# Patient Record
Sex: MALE | Race: WHITE | NOT HISPANIC OR LATINO | Employment: FULL TIME | URBAN - METROPOLITAN AREA
[De-identification: names, ages, dates, MRNs, and addresses within clinical notes are randomized per-mention and may not be internally consistent; named-entity substitution may affect disease eponyms.]

---

## 2017-10-25 ENCOUNTER — APPOINTMENT (EMERGENCY)
Dept: CT IMAGING | Facility: HOSPITAL | Age: 42
End: 2017-10-25
Payer: COMMERCIAL

## 2017-10-25 ENCOUNTER — HOSPITAL ENCOUNTER (EMERGENCY)
Facility: HOSPITAL | Age: 42
Discharge: HOME | End: 2017-10-25
Attending: EMERGENCY MEDICINE | Admitting: EMERGENCY MEDICINE
Payer: COMMERCIAL

## 2017-10-25 VITALS
SYSTOLIC BLOOD PRESSURE: 169 MMHG | HEIGHT: 71 IN | DIASTOLIC BLOOD PRESSURE: 95 MMHG | WEIGHT: 200 LBS | TEMPERATURE: 97.8 F | RESPIRATION RATE: 18 BRPM | HEART RATE: 58 BPM | BODY MASS INDEX: 28 KG/M2 | OXYGEN SATURATION: 98 %

## 2017-10-25 DIAGNOSIS — N28.89 RENAL MASS: ICD-10-CM

## 2017-10-25 DIAGNOSIS — S30.1XXA CONTUSION OF ABDOMINAL WALL, INITIAL ENCOUNTER: Primary | ICD-10-CM

## 2017-10-25 LAB
ALBUMIN SERPL BCP-MCNC: 4.2 G/DL (ref 3.5–5)
ALP SERPL-CCNC: 81 U/L (ref 46–116)
ALT SERPL W P-5'-P-CCNC: 18 U/L (ref 12–78)
ANION GAP SERPL CALCULATED.3IONS-SCNC: 6 MMOL/L (ref 4–13)
AST SERPL W P-5'-P-CCNC: 22 U/L (ref 5–45)
BASOPHILS # BLD AUTO: 0.04 THOUSANDS/ΜL (ref 0–0.1)
BASOPHILS NFR BLD AUTO: 1 % (ref 0–1)
BILIRUB SERPL-MCNC: 0.6 MG/DL (ref 0.2–1)
BUN SERPL-MCNC: 23 MG/DL (ref 5–25)
CALCIUM SERPL-MCNC: 8.9 MG/DL (ref 8.3–10.1)
CHLORIDE SERPL-SCNC: 105 MMOL/L (ref 100–108)
CO2 SERPL-SCNC: 28 MMOL/L (ref 21–32)
CREAT SERPL-MCNC: 1.05 MG/DL (ref 0.6–1.3)
EOSINOPHIL # BLD AUTO: 0.26 THOUSAND/ΜL (ref 0–0.61)
EOSINOPHIL NFR BLD AUTO: 4 % (ref 0–6)
ERYTHROCYTE [DISTWIDTH] IN BLOOD BY AUTOMATED COUNT: 12 % (ref 11.6–15.1)
GFR SERPL CREATININE-BSD FRML MDRD: 86 ML/MIN/1.73SQ M
GLUCOSE SERPL-MCNC: 101 MG/DL (ref 65–140)
HCT VFR BLD AUTO: 45.4 % (ref 36.5–49.3)
HGB BLD-MCNC: 15.9 G/DL (ref 12–17)
LIPASE SERPL-CCNC: 212 U/L (ref 73–393)
LYMPHOCYTES # BLD AUTO: 2.46 THOUSANDS/ΜL (ref 0.6–4.47)
LYMPHOCYTES NFR BLD AUTO: 35 % (ref 14–44)
MCH RBC QN AUTO: 30.7 PG (ref 26.8–34.3)
MCHC RBC AUTO-ENTMCNC: 35 G/DL (ref 31.4–37.4)
MCV RBC AUTO: 88 FL (ref 82–98)
MONOCYTES # BLD AUTO: 0.54 THOUSAND/ΜL (ref 0.17–1.22)
MONOCYTES NFR BLD AUTO: 8 % (ref 4–12)
NEUTROPHILS # BLD AUTO: 3.78 THOUSANDS/ΜL (ref 1.85–7.62)
NEUTS SEG NFR BLD AUTO: 53 % (ref 43–75)
NRBC BLD AUTO-RTO: 0 /100 WBCS
PLATELET # BLD AUTO: 193 THOUSANDS/UL (ref 149–390)
PMV BLD AUTO: 10 FL (ref 8.9–12.7)
POTASSIUM SERPL-SCNC: 4.4 MMOL/L (ref 3.5–5.3)
PROT SERPL-MCNC: 8 G/DL (ref 6.4–8.2)
RBC # BLD AUTO: 5.18 MILLION/UL (ref 3.88–5.62)
SODIUM SERPL-SCNC: 139 MMOL/L (ref 136–145)
WBC # BLD AUTO: 7.09 THOUSAND/UL (ref 4.31–10.16)

## 2017-10-25 PROCEDURE — 74177 CT ABD & PELVIS W/CONTRAST: CPT

## 2017-10-25 PROCEDURE — 85025 COMPLETE CBC W/AUTO DIFF WBC: CPT | Performed by: EMERGENCY MEDICINE

## 2017-10-25 PROCEDURE — 80053 COMPREHEN METABOLIC PANEL: CPT | Performed by: EMERGENCY MEDICINE

## 2017-10-25 PROCEDURE — 99284 EMERGENCY DEPT VISIT MOD MDM: CPT

## 2017-10-25 PROCEDURE — 36415 COLL VENOUS BLD VENIPUNCTURE: CPT | Performed by: EMERGENCY MEDICINE

## 2017-10-25 PROCEDURE — 83690 ASSAY OF LIPASE: CPT | Performed by: EMERGENCY MEDICINE

## 2017-10-25 RX ORDER — LISINOPRIL 10 MG/1
10 TABLET ORAL DAILY
COMMUNITY
End: 2018-04-16 | Stop reason: SDUPTHER

## 2017-10-25 RX ADMIN — IOHEXOL 100 ML: 350 INJECTION, SOLUTION INTRAVENOUS at 14:08

## 2017-10-25 NOTE — DISCHARGE INSTRUCTIONS
Contusion in Adults   WHAT YOU NEED TO KNOW:   A contusion is a bruise that appears on your skin after an injury  A bruise happens when small blood vessels tear but skin does not  When blood vessels tear, blood leaks into nearby tissue, such as soft tissue or muscle  DISCHARGE INSTRUCTIONS:   Return to the emergency department if:   · You have new trouble moving the injured area  · You have tingling or numbness in or near the injured area  · Your hand or foot below the bruise gets cold or turns pale  Contact your healthcare provider if:   · You find a new lump in the injured area  · Your symptoms do not improve with treatment after 4 to 5 days  · You have questions or concerns about your condition or care  Medicines: You may need any of the following:  · NSAIDs  help decrease swelling and pain or fever  This medicine is available with or without a doctor's order  NSAIDs can cause stomach bleeding or kidney problems in certain people  If you take blood thinner medicine, always ask your healthcare provider if NSAIDs are safe for you  Always read the medicine label and follow directions  · Prescription pain medicine  may be given  Do not wait until the pain is severe before you take your medicine  · Take your medicine as directed  Contact your healthcare provider if you think your medicine is not helping or if you have side effects  Tell him of her if you are allergic to any medicine  Keep a list of the medicines, vitamins, and herbs you take  Include the amounts, and when and why you take them  Bring the list or the pill bottles to follow-up visits  Carry your medicine list with you in case of an emergency  Follow up with your healthcare provider as directed: You may need to return within a week to check your injury again  Write down your questions so you remember to ask them during your visits    Help a contusion heal:   · Rest the injured area  or use it less than usual  If you bruised your leg or foot, you may need crutches or a cane to help you walk  This will help you keep weight off your injured body part  · Apply ice  to decrease swelling and pain  Ice may also help prevent tissue damage  Use an ice pack, or put crushed ice in a plastic bag  Cover it with a towel and place it on your bruise for 15 to 20 minutes every hour or as directed  · Use compression  to support the area and decrease swelling  Wrap an elastic bandage around the area over the bruised muscle  Make sure the bandage is not too tight  You should be able to fit 1 finger between the bandage and your skin  · Elevate (raise) your injured body part  above the level of your heart to help decrease pain and swelling  Use pillows, blankets, or rolled towels to elevate the area as often as you can  · Do not drink alcohol  as directed  Alcohol may slow healing  · Do not stretch injured muscles  right after your injury  Ask your healthcare provider when and how you may safely stretch after your injury  Gentle stretches can help increase your flexibility  · Do not massage the area or put heating pads  on the bruise right after your injury  Heat and massage may slow healing  Your healthcare provider may tell you to apply heat after several days  At that time, heat will start to help the injury heal   Prevent another contusion:   · Stretch and warm up before you play sports or exercise  · Wear protective gear when you play sports  Examples are shin guards and padding  · If you begin a new physical activity, start slowly to give your body a chance to adjust   © 2017 [employer] Information is for End User's use only and may not be sold, redistributed or otherwise used for commercial purposes  All illustrations and images included in CareNotes® are the copyrighted property of [employer]  or Jayson Chow  The above information is an  only   It is not intended as medical advice for individual conditions or treatments  Talk to your doctor, nurse or pharmacist before following any medical regimen to see if it is safe and effective for you  Blunt Abdominal Injury   WHAT YOU NEED TO KNOW:   A blunt abdominal injury is a direct blow to the abdomen without an open wound  These injuries are caused by car accidents, sports injuries, or a fall  Organs such as your pancreas, liver, spleen, or bladder may be injured  Your intestines may also be injured  These injuries may cause internal bleeding  DISCHARGE INSTRUCTIONS:   Call 911 for any of the following:   · You feel weak, lightheaded, or you faint  · You have a fast heartbeat, fast breathing, and pale, sweaty skin  · You have new or severe pain, swelling, or firmness in your abdomen  Return to the emergency department if:   · You have nausea and are vomiting  · You have blood in your urine or bowel movement  Contact your healthcare provider if:   · You have questions or concerns about your condition or care  Medicines:   · NSAIDs  help decrease swelling and pain or fever  This medicine is available with or without a doctor's order  NSAIDs can cause stomach bleeding or kidney problems in certain people  If you take blood thinner medicine, always ask your healthcare provider if NSAIDs are safe for you  Always read the medicine label and follow directions  · Take your medicine as directed  Contact your healthcare provider if you think your medicine is not helping or if you have side effects  Tell him or her if you are allergic to any medicine  Keep a list of the medicines, vitamins, and herbs you take  Include the amounts, and when and why you take them  Bring the list or the pill bottles to follow-up visits  Carry your medicine list with you in case of an emergency  Apply ice:  Ice helps to decrease swelling and pain  Ice may also help prevent tissue damage  Use an ice pack, or put crushed ice in a plastic bag   Cover it with a towel before you apply it to your skin  Place it on your injured area for 15 to 20 minutes every hour or as directed  Limit activity as directed: This will help decrease pain and swelling, and prevent other injuries  Do not exercise or play sports until your healthcare provider says it is okay  Follow up with your healthcare provider as directed:  Write down your questions so you remember to ask them during your visits  © 2017 26[address] [facility] Information is for End User's use only and may not be sold, redistributed or otherwise used for commercial purposes  All illustrations and images included in CareNotes® are the copyrighted property of A D A M [employer] Inc  or Jayson Chow  The above information is an  only  It is not intended as medical advice for individual conditions or treatments  Talk to your doctor, nurse or pharmacist before following any medical regimen to see if it is safe and effective for you
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
## 2017-11-02 ENCOUNTER — HOSPITAL ENCOUNTER (OUTPATIENT)
Dept: CT IMAGING | Facility: HOSPITAL | Age: 42
Discharge: HOME | End: 2017-11-02
Payer: COMMERCIAL

## 2017-11-02 ENCOUNTER — GENERIC CONVERSION - ENCOUNTER (OUTPATIENT)
Dept: OTHER | Facility: OTHER | Age: 42
End: 2017-11-02

## 2017-11-02 ENCOUNTER — TRANSCRIBE ORDERS (OUTPATIENT)
Dept: ADMINISTRATIVE | Facility: HOSPITAL | Age: 42
End: 2017-11-02

## 2017-11-02 DIAGNOSIS — N28.89 OTHER SPECIFIED DISORDERS OF KIDNEY AND URETER: ICD-10-CM

## 2017-11-02 DIAGNOSIS — N28.89 KIDNEY MASS: Primary | ICD-10-CM

## 2017-11-02 DIAGNOSIS — N28.89 KIDNEY MASS: ICD-10-CM

## 2017-11-02 PROCEDURE — 74178 CT ABD&PLV WO CNTR FLWD CNTR: CPT

## 2017-11-02 RX ADMIN — IOHEXOL 100 ML: 350 INJECTION, SOLUTION INTRAVENOUS at 10:13

## 2017-11-09 ENCOUNTER — ALLSCRIPTS OFFICE VISIT (OUTPATIENT)
Dept: OTHER | Facility: OTHER | Age: 42
End: 2017-11-09

## 2017-11-09 ENCOUNTER — APPOINTMENT (OUTPATIENT)
Dept: LAB | Facility: CLINIC | Age: 42
End: 2017-11-09
Payer: COMMERCIAL

## 2017-11-09 DIAGNOSIS — N28.89 OTHER SPECIFIED DISORDERS OF KIDNEY AND URETER: ICD-10-CM

## 2017-11-09 LAB
BACTERIA UR QL AUTO: ABNORMAL /HPF
BILIRUB UR QL STRIP: NEGATIVE
CLARITY UR: CLEAR
COLOR UR: YELLOW
GLUCOSE UR STRIP-MCNC: NEGATIVE MG/DL
HGB UR QL STRIP.AUTO: NEGATIVE
HYALINE CASTS #/AREA URNS LPF: ABNORMAL /LPF
KETONES UR STRIP-MCNC: NEGATIVE MG/DL
LEUKOCYTE ESTERASE UR QL STRIP: NEGATIVE
NITRITE UR QL STRIP: NEGATIVE
NON-SQ EPI CELLS URNS QL MICRO: ABNORMAL /HPF
PH UR STRIP.AUTO: 6.5 [PH] (ref 4.5–8)
PROT UR STRIP-MCNC: ABNORMAL MG/DL
RBC #/AREA URNS AUTO: ABNORMAL /HPF
SP GR UR STRIP.AUTO: 1.02 (ref 1–1.03)
UROBILINOGEN UR QL STRIP.AUTO: 1 E.U./DL
WBC #/AREA URNS AUTO: ABNORMAL /HPF

## 2017-11-09 PROCEDURE — 81001 URINALYSIS AUTO W/SCOPE: CPT

## 2017-11-10 ENCOUNTER — GENERIC CONVERSION - ENCOUNTER (OUTPATIENT)
Dept: OTHER | Facility: OTHER | Age: 42
End: 2017-11-10

## 2017-12-18 ENCOUNTER — TRANSCRIBE ORDERS (OUTPATIENT)
Dept: ADMINISTRATIVE | Facility: HOSPITAL | Age: 42
End: 2017-12-18

## 2017-12-18 DIAGNOSIS — N28.1 ACQUIRED CYST OF KIDNEY: Primary | ICD-10-CM

## 2018-01-12 NOTE — MISCELLANEOUS
Provider Comments  Provider Comments:   Ziggy Mo did not present for his appointment with the urologist today  I will be very happy to attend to him in the future should he wish to attend this consultation visit        Signatures   Electronically signed by : LOREN Lucas ; Nov 9 2017  9:42AM EST                       (Author)

## 2018-01-17 NOTE — PROGRESS NOTES
Assessment    1  Benign hypertension (401 1) (I10)   2  Otitis externa (380 10) (H60 90)    Plan  Benign hypertension, Health Maintenance    · From  Lisinopril 10 MG Oral Tablet TAKE 1 TABLET DAILY To Lisinopril 20 MG  Oral Tablet Take 1 tablet daily  Otitis externa    · Neomycin-Polymyxin-HC 1 % Otic Solution; INSTILL 3 DROPS IN RIGHT EAR 3  TIMES DAILY  X 3 DAYS    Discussion/Summary  Impression: health maintenance visit  Currently, he eats a healthy diet  Prostate cancer screening: PSA is not indicated  Colorectal cancer screening: colorectal cancer screening is not indicated  The immunizations are up to date  HISTORY OF HYPERTENSION- HAS BEEN OFF MEDS X SEVERAL MONTHS  RESTART MEDS AND WE BE GETTING ANOTHER PHYSICAL THROUGH THE   IMPACTED CERUMEN- REMOVED W/ CURETTE, SLIGHT IRRITATION NOTED TO CANAL WILL TREAT W/ 3 DAYS CORTISPORIN  Chief Complaint  NEED PHYSICAL FOR DEPLOYMENT OVER SEAS      History of Present Illness  HM, Adult Male: The patient is being seen for a health maintenance evaluation  The last health maintenance visit was 18 MONTHS year(s) ago  General Health: The patient's health since the last visit is described as good  He has regular dental visits  He complains of vision problems  He denies hearing loss  Immunizations status: up to date  Lifestyle:  He consumes a diverse and healthy diet  He does not have any weight concerns  He exercises regularly  He does not use tobacco  He consumes alcohol  Screening:   HPI: 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  ALTHOUGH HE IS OUT OF THE  HE IS STILL WORKING W/ THEM TRAINING TROOPS IN [employer]  HAS BEEN OUT OF [facility] MEDS X SEVERAL MONTHS    WAS SMOKING QUIT LAST MONTH      Review of Systems    Constitutional: No fever or chills, feels well, no tiredness, no recent weight gain or weight loss  Eyes: No complaints of eye pain, no red eyes, no discharge from eyes, no itchy eyes     ENT: no complaints of earache, no hearing loss, no nosebleeds, no nasal discharge, no sore throat, no hoarseness  Cardiovascular: No complaints of slow heart rate, no fast heart rate, no chest pain, no palpitations, no leg claudication, no lower extremity  Respiratory: No complaints of shortness of breath, no wheezing, no cough, no SOB on exertion, no orthopnea or PND  Gastrointestinal: No complaints of abdominal pain, no constipation, no nausea or vomiting, no diarrhea or bloody stools  Genitourinary: No complaints of dysuria, no incontinence, no hesitancy, no nocturia, no genital lesion, no testicular pain  Musculoskeletal: No complaints of arthralgia, no myalgias, no joint swelling or stiffness, no limb pain or swelling  Integumentary: No complaints of skin rash or skin lesions, no itching, no skin wound, no dry skin  Neurological: No compliants of headache, no confusion, no convulsions, no numbness or tingling, no dizziness or fainting, no limb weakness, no difficulty walking  Psychiatric: Is not suicidal, no sleep disturbances, no anxiety or depression, no change in personality, no emotional problems  Endocrine: No complaints of proptosis, no hot flashes, no muscle weakness, no erectile dysfunction, no deepening of the voice, no feelings of weakness  Hematologic/Lymphatic: No complaints of swollen glands, no swollen glands in the neck, does not bleed easily, no easy bruising  Social History    · Former smoker (I83 25) (D89 650)   · No drug use   · Social alcohol use (Z78 9)    Current Meds   1  Lisinopril 10 MG Oral Tablet; TAKE 1 TABLET DAILY; Therapy: [id number] to (Sherry Martin)  Requested for: [id number]; Last   Rx:[id number] Ordered    Allergies    1   No Known Drug Allergies    Vitals   Recorded: [id number] 09:48AM   Heart Rate 72   Systolic 156   Diastolic 90   Height 5 ft 10 in   Weight 191 lb 2 08 oz   BMI Calculated 27 42   BSA Calculated 2 05     Physical Exam    Constitutional   General appearance: No acute distress, well appearing and well nourished  Neck   Neck: Supple, symmetric, trachea midline, no masses  Pulmonary   Auscultation of lungs: Clear to auscultation  Cardiovascular   Auscultation of heart: Normal rate and rhythm, normal S1 and S2, no murmurs  Chest   Chest: Normal     Abdomen   Abdomen: Non-tender, no masses  Genitourinary   Scrotal contents: Normal testes, no masses  Penis: Normal, no lesions  Lymphatic   Palpation of lymph nodes in neck: No lymphadenopathy  Palpation of lymph nodes in axillae: No lymphadenopathy  Palpation of lymph nodes in groin: No lymphadenopathy  Musculoskeletal   Gait and station: Normal     Skin   Skin and subcutaneous tissue: Normal without rashes or lesions         Signatures   Electronically signed by : JULIO Bear; Jun 13 2016 10:40AM EST                       (Author)    Electronically signed by : LOREN Salomon ; Jun 13 2016 12:07PM EST

## 2018-01-22 VITALS
SYSTOLIC BLOOD PRESSURE: 136 MMHG | DIASTOLIC BLOOD PRESSURE: 78 MMHG | OXYGEN SATURATION: 96 % | WEIGHT: 185.38 LBS | HEART RATE: 68 BPM | BODY MASS INDEX: 26.54 KG/M2 | HEIGHT: 70 IN

## 2018-01-22 VITALS
DIASTOLIC BLOOD PRESSURE: 88 MMHG | BODY MASS INDEX: 27.11 KG/M2 | HEIGHT: 70 IN | SYSTOLIC BLOOD PRESSURE: 146 MMHG | HEART RATE: 72 BPM | WEIGHT: 189.38 LBS

## 2018-04-16 ENCOUNTER — OFFICE VISIT (OUTPATIENT)
Dept: INTERNAL MEDICINE CLINIC | Facility: CLINIC | Age: 43
End: 2018-04-16
Payer: COMMERCIAL

## 2018-04-16 VITALS
SYSTOLIC BLOOD PRESSURE: 170 MMHG | DIASTOLIC BLOOD PRESSURE: 92 MMHG | BODY MASS INDEX: 28.08 KG/M2 | RESPIRATION RATE: 14 BRPM | HEIGHT: 71 IN | WEIGHT: 200.6 LBS | HEART RATE: 64 BPM

## 2018-04-16 DIAGNOSIS — I10 BENIGN HYPERTENSION: ICD-10-CM

## 2018-04-16 DIAGNOSIS — M54.2 CERVICALGIA: Primary | ICD-10-CM

## 2018-04-16 DIAGNOSIS — I10 ESSENTIAL HYPERTENSION: Primary | ICD-10-CM

## 2018-04-16 PROCEDURE — 99213 OFFICE O/P EST LOW 20 MIN: CPT | Performed by: INTERNAL MEDICINE

## 2018-04-16 RX ORDER — LISINOPRIL 10 MG/1
10 TABLET ORAL DAILY
Qty: 90 TABLET | Refills: 3 | Status: SHIPPED | OUTPATIENT
Start: 2018-04-16 | End: 2018-12-04
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
## 2018-04-30 ENCOUNTER — APPOINTMENT (OUTPATIENT)
Dept: LAB | Facility: CLINIC | Age: 43
End: 2018-04-30
Payer: COMMERCIAL

## 2018-04-30 DIAGNOSIS — I10 ESSENTIAL HYPERTENSION: ICD-10-CM

## 2018-04-30 LAB
ALBUMIN SERPL BCP-MCNC: 4.1 G/DL (ref 3.5–5)
ALP SERPL-CCNC: 77 U/L (ref 46–116)
ALT SERPL W P-5'-P-CCNC: 53 U/L (ref 12–78)
ANION GAP SERPL CALCULATED.3IONS-SCNC: 7 MMOL/L (ref 4–13)
AST SERPL W P-5'-P-CCNC: 25 U/L (ref 5–45)
BILIRUB SERPL-MCNC: 0.46 MG/DL (ref 0.2–1)
BUN SERPL-MCNC: 15 MG/DL (ref 5–25)
CALCIUM SERPL-MCNC: 9.2 MG/DL (ref 8.3–10.1)
CHLORIDE SERPL-SCNC: 104 MMOL/L (ref 100–108)
CHOLEST SERPL-MCNC: 144 MG/DL (ref 50–200)
CO2 SERPL-SCNC: 27 MMOL/L (ref 21–32)
CREAT SERPL-MCNC: 1.01 MG/DL (ref 0.6–1.3)
GFR SERPL CREATININE-BSD FRML MDRD: 90 ML/MIN/1.73SQ M
GLUCOSE P FAST SERPL-MCNC: 107 MG/DL (ref 65–99)
HDLC SERPL-MCNC: 32 MG/DL (ref 40–60)
LDLC SERPL CALC-MCNC: 78 MG/DL (ref 0–100)
NONHDLC SERPL-MCNC: 112 MG/DL
POTASSIUM SERPL-SCNC: 4.2 MMOL/L (ref 3.5–5.3)
PROT SERPL-MCNC: 8.3 G/DL (ref 6.4–8.2)
SODIUM SERPL-SCNC: 138 MMOL/L (ref 136–145)
TRIGL SERPL-MCNC: 171 MG/DL
TSH SERPL DL<=0.05 MIU/L-ACNC: 2.9 UIU/ML (ref 0.36–3.74)

## 2018-04-30 PROCEDURE — 84443 ASSAY THYROID STIM HORMONE: CPT

## 2018-04-30 PROCEDURE — 80061 LIPID PANEL: CPT

## 2018-04-30 PROCEDURE — 80053 COMPREHEN METABOLIC PANEL: CPT

## 2018-04-30 PROCEDURE — 36415 COLL VENOUS BLD VENIPUNCTURE: CPT

## 2018-05-01 ENCOUNTER — EVALUATION (OUTPATIENT)
Dept: PHYSICAL THERAPY | Facility: CLINIC | Age: 43
End: 2018-05-01
Payer: COMMERCIAL

## 2018-05-01 DIAGNOSIS — M54.2 CERVICALGIA: Primary | ICD-10-CM

## 2018-05-01 PROCEDURE — 97161 PT EVAL LOW COMPLEX 20 MIN: CPT | Performed by: PHYSICAL THERAPIST

## 2018-05-01 PROCEDURE — G8991 OTHER PT/OT GOAL STATUS: HCPCS | Performed by: PHYSICAL THERAPIST

## 2018-05-01 PROCEDURE — G8990 OTHER PT/OT CURRENT STATUS: HCPCS | Performed by: PHYSICAL THERAPIST

## 2018-05-01 PROCEDURE — 97110 THERAPEUTIC EXERCISES: CPT | Performed by: PHYSICAL THERAPIST
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
## 2018-05-02 PROBLEM — N28.1 COMPLEX RENAL CYST: Status: ACTIVE | Noted: 2017-11-10

## 2018-05-07 ENCOUNTER — OFFICE VISIT (OUTPATIENT)
Dept: PHYSICAL THERAPY | Facility: CLINIC | Age: 43
End: 2018-05-07
Payer: COMMERCIAL

## 2018-05-07 DIAGNOSIS — M54.2 CERVICALGIA: Primary | ICD-10-CM

## 2018-05-07 PROCEDURE — 97110 THERAPEUTIC EXERCISES: CPT | Performed by: PHYSICAL THERAPIST

## 2018-05-07 PROCEDURE — 97140 MANUAL THERAPY 1/> REGIONS: CPT | Performed by: PHYSICAL THERAPIST

## 2018-05-07 PROCEDURE — 97112 NEUROMUSCULAR REEDUCATION: CPT | Performed by: PHYSICAL THERAPIST

## 2018-05-07 NOTE — PROGRESS NOTES
Daily Note     Today's date: 2018  Patient name: Prabhakar Riojas  : 1975  MRN: [id number]  Referring provider: Priyanka Pacheco MD  Dx:   Encounter Diagnosis     ICD-10-CM    1  Cervicalgia M54 2                   Subjective: SPR 5/10 in the cervical spine  Reports that when he is holding the steering wheel he does get numbness down the LUE  Objective: See treatment diary below    Precautions: HTN    Daily Treatment Diary     Manual             STM/IASTM bilateral UT/Levator scap  17 mins                                                                   Exercise Diary             Median nerve glides bilat  10 ea  10x           UT stretch 3x20" HEP           C/S rot  str  3x20" HEP           Rhomboid str  nv           Levator str  30"x4           Scap ret  Iso   2x10           C/S retract  nv           Scap wall sides  2x10           Supine chin tucks  2x10           TB row  gtb 3x10           TB low extension  gtb 3x10           LT ER  RTB 3x10                                                                                                                       Modalities              MH/TENS prn  MH 10 mins                                           Assessment: Consistent 5/10 soreness noted throughout  During UBE he did report some paresthesias in the LUE that did resolve once the activity was stopped  Patient reports that he also feels soreness during periscapular activation exercises  Progress as able      Plan: Continue per plan of care

## 2018-05-10 ENCOUNTER — OFFICE VISIT (OUTPATIENT)
Dept: PHYSICAL THERAPY | Facility: CLINIC | Age: 43
End: 2018-05-10
Payer: COMMERCIAL

## 2018-05-10 DIAGNOSIS — M54.2 CERVICALGIA: Primary | ICD-10-CM

## 2018-05-10 PROCEDURE — 97140 MANUAL THERAPY 1/> REGIONS: CPT

## 2018-05-10 PROCEDURE — 97110 THERAPEUTIC EXERCISES: CPT

## 2018-05-10 PROCEDURE — 97112 NEUROMUSCULAR REEDUCATION: CPT

## 2018-05-10 NOTE — PROGRESS NOTES
Daily Note     Today's date: 5/10/2018  Patient name: Leela Quinn  : 1975  MRN: [id number]  Referring provider: Fox Merida MD  Dx:   Encounter Diagnosis     ICD-10-CM    1  Cervicalgia M54 2                   Subjective: SPR 3/10 in the cervical spine  Patient reports he continues to have numbness in LUE while hand is on steering wheel  Pt also reports HA after IASTM last session  Objective: See treatment diary below    Precautions: HTN    Daily Treatment Diary     Manual  5/1 5/7 5/10          STM/IASTM bilateral UT/Levator scap  17 mins 13 min          SOR   4 min                                                     Exercise Diary  5/1 5/7 5/10          Median nerve glides bilat  10 ea  10x x10          UT stretch 3x20" HEP           C/S rot  str  3x20" HEP           Rhomboid str  nv           Levator str  30"x4 :30x4          Scap ret  Iso   2x10 2x10          C/S retract  nv 2x10          Scap wall sides  2x10 2x10          Supine chin tucks  2x10 2x10          TB row  gtb 3x10 gtb 3x10          TB low extension  gtb 3x10 gtb 3x10          LT ER  RTB 3x10 rtb 3x10          pulleys   4 min                                                                                                         Modalities   5/7 5/10          MH/TENS prn  MH 10 mins Pt deferred                                          Assessment:  Minimal tenderness in SO region, relieved with SOR  Patient demonstrating muscle guarding in cervical region  LUE fatigue noted after exercise  Patient reported minor HA and "throbbing" in BUT post session  Pt deferred MHP today  Monitor response nv  Plan: Continue per plan of care

## 2018-05-16 ENCOUNTER — OFFICE VISIT (OUTPATIENT)
Dept: PHYSICAL THERAPY | Facility: CLINIC | Age: 43
End: 2018-05-16
Payer: COMMERCIAL

## 2018-05-16 DIAGNOSIS — M54.2 CERVICALGIA: Primary | ICD-10-CM

## 2018-05-16 PROCEDURE — 97140 MANUAL THERAPY 1/> REGIONS: CPT | Performed by: PHYSICAL THERAPIST

## 2018-05-16 PROCEDURE — 97110 THERAPEUTIC EXERCISES: CPT | Performed by: PHYSICAL THERAPIST

## 2018-05-16 PROCEDURE — 97112 NEUROMUSCULAR REEDUCATION: CPT | Performed by: PHYSICAL THERAPIST

## 2018-05-16 NOTE — PROGRESS NOTES
Daily Note     Today's date: 2018  Patient name: Azucena Denis  : 1975  MRN: [id number]  Referring provider: Lorrie Thakur MD  Dx:   Encounter Diagnosis     ICD-10-CM    1  Cervicalgia M54 2                   Subjective: SPR 3/10 in the cervical spine but states that he continues to get tingling in the fingers, mostly in the middle finger and ring finger but all fingers are affected  Objective: See treatment diary below    Precautions: HTN    Daily Treatment Diary     Manual  5/1 5/7 5/10 5/16         STM/IASTM bilateral UT/Levator scap  17 mins 13 min 12 mins         SOR   4 min 1 min         CTJ nag    GM         Prone PA grade II    CTJ regionGM                          Exercise Diary  5/1 5/7 5/10 5/16         Median nerve glides bilat  10 ea  10x x10          UT stretch 3x20" HEP           C/S rot  str  3x20" HEP           Rhomboid str  nv  10"x5         Levator str  30"x4 :30x4 30"x4         Scap ret  Iso   2x10 2x10          C/S retract  nv 2x10 2x10 pain         Scap wall sides  2x10 2x10 2x10         Supine chin tucks  2x10 2x10 2x10         TB row  gtb 3x10 gtb 3x10 gtb 3x10         TB low extension  gtb 3x10 gtb 3x10 gtb 3x10         LT ER  RTB 3x10 rtb 3x10 rtb 3x10         pulleys   4 min np                                                                                                        Modalities   5/7 5/10 5/16         MH/TENS prn  MH 10 mins Pt deferred Deferred                                         Assessment:  Post manual intervention,improvements noted with L cervical rotation and cervical flexion, continues to report challenges with cervical extension and R rotation  Hypomobility in CTJ region, reports that he is getting adjustments at chiropractor  Decreased postural endurance remains  Plan: Continue per plan of care

## 2018-05-21 ENCOUNTER — APPOINTMENT (OUTPATIENT)
Dept: PHYSICAL THERAPY | Facility: CLINIC | Age: 43
End: 2018-05-21
Payer: COMMERCIAL

## 2018-05-24 ENCOUNTER — APPOINTMENT (OUTPATIENT)
Dept: PHYSICAL THERAPY | Facility: CLINIC | Age: 43
End: 2018-05-24
Payer: COMMERCIAL

## 2018-05-29 ENCOUNTER — OFFICE VISIT (OUTPATIENT)
Dept: PHYSICAL THERAPY | Facility: CLINIC | Age: 43
End: 2018-05-29
Payer: COMMERCIAL

## 2018-05-29 DIAGNOSIS — M54.2 CERVICALGIA: Primary | ICD-10-CM

## 2018-05-29 PROCEDURE — 97112 NEUROMUSCULAR REEDUCATION: CPT

## 2018-05-29 PROCEDURE — 97110 THERAPEUTIC EXERCISES: CPT

## 2018-05-29 NOTE — PROGRESS NOTES
Daily Note     Today's date: 2018  Patient name: Sage Adame  : 1975  MRN: [id number]  Referring provider: Lora Marr MD  Dx:   Encounter Diagnosis     ICD-10-CM    1  Cervicalgia M54 2                   Subjective: Pt reports significant improvement in cervical symptoms  Pt reports he was travelling for 1 week and began to feel overall improvement  Pt reports n/t into palmar aspect of L hand while elbow is in flexion  Objective: See treatment diary below    Precautions: HTN    Daily Treatment Diary     Manual  5/1 5/7 5/10 5/16 5/29        STM/IASTM bilateral UT/Levator scap  17 mins 13 min 12 mins Pt deferred        SOR   4 min 1 min np        CTJ nag    GM np        Prone PA grade II    CTJ regionGM np                         Exercise Diary  5/1 5/7 5/10 5/16 5/29        Median nerve glides bilat  10 ea  10x x10  2x10        UT stretch 3x20" HEP           C/S rot  str  3x20" HEP           Rhomboid str  nv  10"x5         Levator str  30"x4 :30x4 30"x4         Scap ret  Iso   2x10 2x10          C/S retract  nv 2x10 2x10 pain 2x10        Scap wall sides  2x10 2x10 2x10 3x10        Supine chin tucks  2x10 2x10 2x10 2x10        TB row  gtb 3x10 gtb 3x10 gtb 3x10 btb 2x10        TB low extension  gtb 3x10 gtb 3x10 gtb 3x10 btb 2x10        LT ER  RTB 3x10 rtb 3x10 rtb 3x10 gtb 2x10        pulleys   4 min np 4 min        Serratus punches     2x20        Ulnar nerve glides      2x10        Prone row/ext     3# x30                                                                Modalities   5/7 5/10 5/16 5/29        MH/TENS prn  MH 10 mins Pt deferred Deferred                                         Assessment:  No change in radicular symptoms into LUE after performing median nerve glides  Minimal pain with cervical retractions today, able to complete 2x10 without difficulty  (+) ulnar nerve tension with ulnar nerve glides    Verbal and tactile cues provided in order to facilitate proper exercise form and dosage  L cervical rotation 72*, R cervical rotation 61*  Plan: Continue per plan of care

## 2018-05-30 ENCOUNTER — OFFICE VISIT (OUTPATIENT)
Dept: INTERNAL MEDICINE CLINIC | Facility: CLINIC | Age: 43
End: 2018-05-30
Payer: COMMERCIAL

## 2018-05-30 ENCOUNTER — TELEPHONE (OUTPATIENT)
Dept: INTERNAL MEDICINE CLINIC | Facility: CLINIC | Age: 43
End: 2018-05-30

## 2018-05-30 VITALS
RESPIRATION RATE: 18 BRPM | SYSTOLIC BLOOD PRESSURE: 148 MMHG | OXYGEN SATURATION: 98 % | BODY MASS INDEX: 27.58 KG/M2 | HEIGHT: 71 IN | WEIGHT: 197 LBS | HEART RATE: 72 BPM | DIASTOLIC BLOOD PRESSURE: 96 MMHG

## 2018-05-30 DIAGNOSIS — S13.4XXS WHIPLASH INJURY TO NECK, SEQUELA: ICD-10-CM

## 2018-05-30 DIAGNOSIS — M54.2 CERVICALGIA: ICD-10-CM

## 2018-05-30 DIAGNOSIS — M54.12 CERVICAL RADICULOPATHY: ICD-10-CM

## 2018-05-30 DIAGNOSIS — I10 BENIGN HYPERTENSION: Primary | ICD-10-CM

## 2018-05-30 PROCEDURE — 99214 OFFICE O/P EST MOD 30 MIN: CPT | Performed by: NURSE PRACTITIONER
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
## 2018-05-30 NOTE — TELEPHONE ENCOUNTER
Patient can schedule test through Central Sched[facility]- gave him the p#- no auth through [employer] [facility]

## 2018-05-30 NOTE — PATIENT INSTRUCTIONS
Cervicalgia with radiculopathy, he does have significant decrease in sensation weakness and tingling predominantly to the C7-8 distribution  His symptoms seem to worsen with physical therapy  Therefore we will proceed with MRI of the C-spine      Renal cyst following with Urology he is due for repeat CT renal protocol check BMP prior to study    Hypertension borderline today recommend low-sodium diet

## 2018-06-01 ENCOUNTER — APPOINTMENT (OUTPATIENT)
Dept: PHYSICAL THERAPY | Facility: CLINIC | Age: 43
End: 2018-06-01
Payer: COMMERCIAL

## 2018-06-06 ENCOUNTER — OFFICE VISIT (OUTPATIENT)
Dept: PHYSICAL THERAPY | Facility: CLINIC | Age: 43
End: 2018-06-06
Payer: COMMERCIAL

## 2018-06-06 DIAGNOSIS — M54.2 CERVICALGIA: Primary | ICD-10-CM

## 2018-06-06 PROCEDURE — 97140 MANUAL THERAPY 1/> REGIONS: CPT

## 2018-06-06 PROCEDURE — 97110 THERAPEUTIC EXERCISES: CPT

## 2018-06-06 PROCEDURE — 97112 NEUROMUSCULAR REEDUCATION: CPT

## 2018-06-06 NOTE — PROGRESS NOTES
Daily Note     Today's date: 2018  Patient name: Deacon Boyer  : 1975  MRN: [id number]  Referring provider: Davonte Kuhn MD  Dx:   Encounter Diagnosis     ICD-10-CM    1  Cervicalgia M54 2                   Subjective: Pt reports continued numbness and tingling into L hand which increases when using LUE for steering vehicle  No neck pain or discomfort reported  Patient reports MRI is scheduled for   Objective: See treatment diary below    Precautions: HTN    Daily Treatment Diary     Manual  5/1 5/7 5/10 5/16 5/29 6/6       STM/IASTM bilateral UT/Levator scap  17 mins 13 min 12 mins Pt deferred        SOR   4 min 1 min np        CTJ nag    GM np        Prone PA grade II    CTJ regionGM np        Ulnar/median nerve glides      8'           Exercise Diary  5/1 5/7 5/10 5/16 5/29 6/6       Median nerve glides bilat  10 ea  10x x10  2x10 2x10       UT stretch 3x20" HEP           C/S rot  str  3x20" HEP           Rhomboid str  nv  10"x5         Levator str  30"x4 :30x4 30"x4         Scap ret  Iso   2x10 2x10          C/S retract  nv 2x10 2x10 pain 2x10 2x10       Scap wall sides  2x10 2x10 2x10 3x10 gtb 2x10       Supine chin tucks  2x10 2x10 2x10 2x10 2x10       TB row  gtb 3x10 gtb 3x10 gtb 3x10 btb 2x10 btb 3x10       TB low extension  gtb 3x10 gtb 3x10 gtb 3x10 btb 2x10 btb 3x10       LT ER  RTB 3x10 rtb 3x10 rtb 3x10 gtb 2x10 gtb 2x10       pulleys   4 min np 4 min 4 min       Serratus punches     2x20 3# x20       Ulnar nerve glides      2x10 2x10, x10       Prone row/ext     3# x30 3# x30                                                               Modalities   5/7 5/10 5/16 5/29        MH/TENS prn  MH 10 mins Pt deferred Deferred                                         Assessment:  Positive median neural tension demonstrated during manual nerve glides  N/T reported in hand with elbow in extended position  Pt reported LUE fatigues vs RUE when performing UE TE    Decreased postural endurance noted  Plan: Continue per plan of care

## 2018-06-08 ENCOUNTER — HOSPITAL ENCOUNTER (OUTPATIENT)
Dept: MRI IMAGING | Facility: HOSPITAL | Age: 43
Discharge: HOME | End: 2018-06-08
Payer: COMMERCIAL

## 2018-06-08 ENCOUNTER — OFFICE VISIT (OUTPATIENT)
Dept: PHYSICAL THERAPY | Facility: CLINIC | Age: 43
End: 2018-06-08
Payer: COMMERCIAL

## 2018-06-08 ENCOUNTER — TELEPHONE (OUTPATIENT)
Dept: INTERNAL MEDICINE CLINIC | Facility: CLINIC | Age: 43
End: 2018-06-08

## 2018-06-08 DIAGNOSIS — M54.12 CERVICAL RADICULOPATHY: ICD-10-CM

## 2018-06-08 DIAGNOSIS — M54.12 CERVICAL RADICULOPATHY: Primary | ICD-10-CM

## 2018-06-08 DIAGNOSIS — M54.2 CERVICALGIA: Primary | ICD-10-CM

## 2018-06-08 PROCEDURE — 72141 MRI NECK SPINE W/O DYE: CPT

## 2018-06-08 PROCEDURE — 97140 MANUAL THERAPY 1/> REGIONS: CPT

## 2018-06-08 PROCEDURE — 97110 THERAPEUTIC EXERCISES: CPT

## 2018-06-08 NOTE — TELEPHONE ENCOUNTER
I was  hoping he would say he was fine      Bustamante Spruce We can could just monitor his sx, sent him to neurology or do a nerve test to see what is going on   Bustamante Spruce  Those are his options

## 2018-06-08 NOTE — PROGRESS NOTES
Daily Note     Today's date: 2018  Patient name: Azucena Denis  : 1975  MRN: [id number]  Referring provider: Lorrie Thakur MD  Dx:   Encounter Diagnosis     ICD-10-CM    1  Cervicalgia M54 2                   Subjective: Pt reports constant, nagging numbness/tingling into first two digits and thumb of L hand, intensity 7/10  States it really bugs him when driving  No relief of radicular sx  Does state his neck feels better overall  Has an appointment for an MRI later this morning  Objective: See treatment diary below      Manual  5/1 5/7 5/10 5/16 5/29 6/6  6/8         STM/IASTM bilateral UT/Levator scap   17 mins 13 min 12 mins Pt deferred             SOR     4 min 1 min np             CTJ nag       GM np             Prone PA grade II       CTJ regionGM np             Ulnar/median nerve glides           8'  8'               Exercise Diary  5/1 5/7 5/10 5/16 5/29 6/6  6/8         Median nerve glides bilat  10 ea  10x x10   2x10 2x10  2X10         UT stretch 3x20" HEP                   C/S rot  str  3x20" HEP                   Rhomboid str    nv   10"x5               Levator str    30"x4 :30x4 30"x4               Scap ret   Iso    2x10 2x10                 C/S retract   nv 2x10 2x10 pain 2x10 2x10  2x10         Scap wall sides   2x10 2x10 2x10 3x10 gtb 2x10  gtb 2x10         Supine chin tucks   2x10 2x10 2x10 2x10 2x10  2x10         TB row   gtb 3x10 gtb 3x10 gtb 3x10 btb 2x10 btb 3x10  btb 3x10         TB low extension   gtb 3x10 gtb 3x10 gtb 3x10 btb 2x10 btb 3x10  [id number]         LT ER   RTB 3x10 rtb 3x10 rtb 3x10 gtb 2x10 gtb 2x10  gtb 2x10         pulleys     4 min np 4 min 4 min  4 min         Serratus punches         2x20 3# x20  3# x20         Ulnar nerve glides          2x10 2x10, x10  2X10         Prone row/ext         3# x30 3# x30  3# x30          UBE              3'/3'                                                                                       Modalities    5/7 5/10 5/16 5/29 6/8           MH/TENS prn   MH 10 mins Pt deferred Deferred    pt deferred                                                                    Assessment: Tolerated treatment well  Patient would benefit from continued PT  Challenged appropriately with TE program  Nerve glides did increase radicular sx but pt was able to complete TE program        Plan: Continue per plan of care

## 2018-06-08 NOTE — TELEPHONE ENCOUNTER
----- Message from Franklin Sultana sent at 6/8/2018  2:56 PM EDT -----  Let him know he has the most perfect MRI [facility] ever seen!! How is his arm  Still weak? Still pain?

## 2018-06-08 NOTE — PROGRESS NOTES
Let him know he has the most perfect MRI [facility] ever seen!! How is his arm  Still weak? Still pain?

## 2018-06-11 ENCOUNTER — TELEPHONE (OUTPATIENT)
Dept: NEUROLOGY | Facility: CLINIC | Age: 43
End: 2018-06-11

## 2018-06-11 DIAGNOSIS — M50.10 CERVICAL DISC DISORDER WITH RADICULOPATHY: Primary | ICD-10-CM

## 2018-06-14 ENCOUNTER — APPOINTMENT (OUTPATIENT)
Dept: PHYSICAL THERAPY | Facility: CLINIC | Age: 43
End: 2018-06-14
Payer: COMMERCIAL

## 2018-06-15 ENCOUNTER — APPOINTMENT (OUTPATIENT)
Dept: PHYSICAL THERAPY | Facility: CLINIC | Age: 43
End: 2018-06-15
Payer: COMMERCIAL

## 2018-06-20 ENCOUNTER — OFFICE VISIT (OUTPATIENT)
Dept: NEUROLOGY | Facility: CLINIC | Age: 43
End: 2018-06-20
Payer: COMMERCIAL

## 2018-06-20 VITALS
HEART RATE: 68 BPM | WEIGHT: 195 LBS | HEIGHT: 70 IN | BODY MASS INDEX: 27.92 KG/M2 | SYSTOLIC BLOOD PRESSURE: 134 MMHG | DIASTOLIC BLOOD PRESSURE: 90 MMHG

## 2018-06-20 DIAGNOSIS — M54.12 CERVICAL RADICULOPATHY: ICD-10-CM

## 2018-06-20 PROCEDURE — 99203 OFFICE O/P NEW LOW 30 MIN: CPT | Performed by: PSYCHIATRY & NEUROLOGY
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
## 2018-06-22 PROCEDURE — G8991 OTHER PT/OT GOAL STATUS: HCPCS

## 2018-06-22 PROCEDURE — G8990 OTHER PT/OT CURRENT STATUS: HCPCS

## 2018-06-28 ENCOUNTER — APPOINTMENT (OUTPATIENT)
Dept: PHYSICAL THERAPY | Facility: CLINIC | Age: 43
End: 2018-06-28
Payer: COMMERCIAL

## 2018-07-23 ENCOUNTER — TELEPHONE (OUTPATIENT)
Dept: INTERNAL MEDICINE CLINIC | Facility: CLINIC | Age: 43
End: 2018-07-23
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
## 2018-07-24 ENCOUNTER — TELEPHONE (OUTPATIENT)
Dept: INTERNAL MEDICINE CLINIC | Facility: CLINIC | Age: 43
End: 2018-07-24

## 2018-07-24 NOTE — TELEPHONE ENCOUNTER
----- Message from Doug Morelos, [address] sent at 7/24/2018  8:17 AM EDT -----  I got a reminder that he is overdue for a CT abd to follow up his kidney cyst  Please call him, I know he was leaving for [facility] soon so hopefully we can get this done b/f he leaves

## 2018-08-09 ENCOUNTER — TELEPHONE (OUTPATIENT)
Dept: INTERNAL MEDICINE CLINIC | Facility: CLINIC | Age: 43
End: 2018-08-09

## 2018-08-09 NOTE — TELEPHONE ENCOUNTER
Jill Up wants this message to stay open to make sure we keep following up with patient and his wife to see if he had testing done already if not to remind him he needs it    Letter being sent out as well

## 2018-09-27 ENCOUNTER — TELEPHONE (OUTPATIENT)
Dept: INTERNAL MEDICINE CLINIC | Facility: CLINIC | Age: 43
End: 2018-09-27
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
## 2018-11-01 DIAGNOSIS — N28.1 ACQUIRED CYST OF KIDNEY: ICD-10-CM

## 2018-12-04 ENCOUNTER — OFFICE VISIT (OUTPATIENT)
Dept: INTERNAL MEDICINE CLINIC | Facility: CLINIC | Age: 43
End: 2018-12-04
Payer: COMMERCIAL

## 2018-12-04 ENCOUNTER — APPOINTMENT (OUTPATIENT)
Dept: LAB | Facility: CLINIC | Age: 43
End: 2018-12-04
Payer: COMMERCIAL

## 2018-12-04 VITALS
DIASTOLIC BLOOD PRESSURE: 88 MMHG | BODY MASS INDEX: 28.03 KG/M2 | WEIGHT: 195.8 LBS | RESPIRATION RATE: 16 BRPM | HEART RATE: 78 BPM | HEIGHT: 70 IN | SYSTOLIC BLOOD PRESSURE: 136 MMHG

## 2018-12-04 DIAGNOSIS — I10 BENIGN HYPERTENSION: ICD-10-CM

## 2018-12-04 DIAGNOSIS — Z23 ENCOUNTER FOR IMMUNIZATION: ICD-10-CM

## 2018-12-04 DIAGNOSIS — N28.1 RENAL CYST: Primary | ICD-10-CM

## 2018-12-04 LAB
ANION GAP SERPL CALCULATED.3IONS-SCNC: 8 MMOL/L (ref 4–13)
BUN SERPL-MCNC: 25 MG/DL (ref 5–25)
CALCIUM SERPL-MCNC: 9.2 MG/DL (ref 8.3–10.1)
CHLORIDE SERPL-SCNC: 103 MMOL/L (ref 100–108)
CO2 SERPL-SCNC: 27 MMOL/L (ref 21–32)
CREAT SERPL-MCNC: 0.93 MG/DL (ref 0.6–1.3)
ERYTHROCYTE [DISTWIDTH] IN BLOOD BY AUTOMATED COUNT: 11.7 % (ref 11.6–15.1)
GFR SERPL CREATININE-BSD FRML MDRD: 99 ML/MIN/1.73SQ M
GLUCOSE SERPL-MCNC: 76 MG/DL (ref 65–140)
HCT VFR BLD AUTO: 47 % (ref 36.5–49.3)
HGB BLD-MCNC: 16.2 G/DL (ref 12–17)
MCH RBC QN AUTO: 31.6 PG (ref 26.8–34.3)
MCHC RBC AUTO-ENTMCNC: 34.5 G/DL (ref 31.4–37.4)
MCV RBC AUTO: 92 FL (ref 82–98)
PLATELET # BLD AUTO: 228 THOUSANDS/UL (ref 149–390)
PMV BLD AUTO: 10.5 FL (ref 8.9–12.7)
POTASSIUM SERPL-SCNC: 3.5 MMOL/L (ref 3.5–5.3)
RBC # BLD AUTO: 5.12 MILLION/UL (ref 3.88–5.62)
SODIUM SERPL-SCNC: 138 MMOL/L (ref 136–145)
WBC # BLD AUTO: 7.65 THOUSAND/UL (ref 4.31–10.16)

## 2018-12-04 PROCEDURE — 3079F DIAST BP 80-89 MM HG: CPT | Performed by: NURSE PRACTITIONER

## 2018-12-04 PROCEDURE — 85027 COMPLETE CBC AUTOMATED: CPT

## 2018-12-04 PROCEDURE — 90686 IIV4 VACC NO PRSV 0.5 ML IM: CPT

## 2018-12-04 PROCEDURE — 36415 COLL VENOUS BLD VENIPUNCTURE: CPT

## 2018-12-04 PROCEDURE — 3075F SYST BP GE 130 - 139MM HG: CPT | Performed by: NURSE PRACTITIONER

## 2018-12-04 PROCEDURE — 80048 BASIC METABOLIC PNL TOTAL CA: CPT

## 2018-12-04 PROCEDURE — 99214 OFFICE O/P EST MOD 30 MIN: CPT | Performed by: NURSE PRACTITIONER

## 2018-12-04 PROCEDURE — 90471 IMMUNIZATION ADMIN: CPT

## 2018-12-04 PROCEDURE — 3008F BODY MASS INDEX DOCD: CPT | Performed by: NURSE PRACTITIONER

## 2018-12-04 RX ORDER — LISINOPRIL 20 MG/1
20 TABLET ORAL DAILY
Qty: 180 TABLET | Refills: 1 | Status: SHIPPED | OUTPATIENT
Start: 2018-12-04 | End: 2019-10-14 | Stop reason: SDUPTHER

## 2018-12-04 RX ORDER — LISINOPRIL 20 MG/1
20 TABLET ORAL DAILY
Qty: 180 TABLET | Refills: 1 | Status: CANCELLED | OUTPATIENT
Start: 2018-12-04

## 2018-12-04 NOTE — PROGRESS NOTES
Assessment/Plan:    Patient Instructions   Hypertension:  Stable with current medication  No change indicated at this time  Right renal cyst:  As recommended, repeat CT of the right kidney ordered  BMP ordered prior to CT  Patient instructed to have this done prior to leaving [place] January [place] employment  Discussion with patient regarding the importance of follow-up  Cervicalgia:  Resolved    Health maintenance:  Flu vaccine received today  Follow-up in August of 2019 when patient returns from [facility]  Lab work to be done prior to visit  Diagnoses and all orders for this visit:    Renal cyst  -     CT renal protocol; Future    Benign hypertension  -     lisinopril (ZESTRIL) 20 mg tablet; Take 1 tablet (20 mg total) by mouth daily  -     Basic metabolic panel; Future    Encounter for immunization  -     SYRINGE/SINGLE-DOSE VIAL: influenza vaccine, [id number], quadrivalent, 0 5 mL, preservative-free (AFLURIA, FLUARIX, FLULAVAL, FLUZONE)    Other orders  -     Cancel: lisinopril (ZESTRIL) 20 mg tablet; Take 1 tablet (20 mg total) by mouth daily Patient reports he is not consistently taking this medication         Subjective:      Patient ID: Amanda Hannon is a 37 y o  male    Patient presents today in follow-up  Patient states that his cervicalgia has completely resolved with no numbness or tingling to his left hand  Patient states that he does not monitor his home blood pressure but does take 20 mg of lisinopril consistently  Patient has not completed the CT scan of his right kidney as of yet  He denies any flank pain, urinary symptoms, hematuria  Patient will be leaving in January to go to [facility] for his employment  He will return in August and will complete his annual blood work in physical exam at that time  Patient states he is not happy with his weight and has begun a the 310 shake program   He has 1 shake for breakfast and then eats a moderate lunch and dinner    He is currently not exercising  Patient would like the flu vaccine today          Current Outpatient Prescriptions:     lisinopril (ZESTRIL) 20 mg tablet, Take 1 tablet (20 mg total) by mouth daily, Disp: 180 tablet, Rfl: 1    No results found for this or any previous visit (from the past 1008 hour(s))  The following portions of the patient's history were reviewed and updated as appropriate: allergies, current medications, past family history, past medical history, past social history, past surgical history and problem list      Review of Systems   Constitutional: Negative for activity change, chills, fatigue and fever  HENT: Negative for congestion, ear pain, postnasal drip and rhinorrhea  Eyes: Negative for visual disturbance  Respiratory: Negative for cough, chest tightness, shortness of breath and wheezing  Cardiovascular: Negative for chest pain, palpitations and leg swelling  Gastrointestinal: Negative for blood in stool, constipation and diarrhea  Genitourinary: Negative for difficulty urinating, dysuria, frequency and urgency  Musculoskeletal: Negative for arthralgias, back pain and myalgias  Neurological: Negative for dizziness, syncope, light-headedness and headaches  Psychiatric/Behavioral: Negative for dysphoric mood  Objective:      /88 (BP Location: Left arm, Patient Position: Sitting)   Pulse 78   Resp 16   Ht 5' 10" (1 778 m)   Wt 88 8 kg (195 lb 12 8 oz)   BMI 28 09 kg/m²        Physical Exam   Constitutional: He is oriented to person, place, and time  He appears well-developed and well-nourished  No distress  HENT:   Head: Normocephalic and atraumatic  Eyes: Conjunctivae are normal  Right eye exhibits no discharge  Left eye exhibits no discharge  No scleral icterus  Neck: Neck supple  No thyromegaly present  Cardiovascular: Normal rate, regular rhythm, normal heart sounds and intact distal pulses  No murmur heard  Pulmonary/Chest: No stridor  No respiratory distress  He has no wheezes  He has no rales  Abdominal: Soft  He exhibits no distension  Musculoskeletal: Normal range of motion  He exhibits no edema, tenderness or deformity  Neurological: He is oriented to person, place, and time  Skin: Skin is warm and dry  Psychiatric: He has a normal mood and affect   His behavior is normal  Thought content normal

## 2018-12-04 NOTE — PATIENT INSTRUCTIONS
Hypertension:  Stable with current medication  No change indicated at this time  Right renal cyst:  As recommended, repeat CT of the right kidney ordered  BMP ordered prior to CT  Patient instructed to have this done prior to leaving in January for [place] employment  Discussion with patient regarding the importance of follow-up  Cervicalgia:  Resolved    Health maintenance:  Flu vaccine received today  Follow-up in August of 2019 when patient returns from [facility]  Lab work to be done prior to visit

## 2018-12-17 ENCOUNTER — HOSPITAL ENCOUNTER (OUTPATIENT)
Dept: CT IMAGING | Facility: CLINIC | Age: 43
Discharge: HOME | End: 2018-12-17
Payer: COMMERCIAL

## 2018-12-17 DIAGNOSIS — N28.1 RENAL CYST: ICD-10-CM

## 2018-12-17 PROCEDURE — 74178 CT ABD&PLV WO CNTR FLWD CNTR: CPT

## 2018-12-17 RX ADMIN — IOHEXOL 100 ML: 350 INJECTION, SOLUTION INTRAVENOUS at 08:44

## 2018-12-20 NOTE — PROGRESS NOTES
Hey, not sure if you remember this gentleman, he goes overseas frequently for work, he just came back for [facility] and I got him to repeat this CT- I would have him make a follow up with you but he is leaving for [facility] again for 3 months next week  Should we just repeat 6 months or does he need sooner intervention?

## 2018-12-21 ENCOUNTER — TELEPHONE (OUTPATIENT)
Dept: INTERNAL MEDICINE CLINIC | Facility: CLINIC | Age: 43
End: 2018-12-21

## 2018-12-21 NOTE — TELEPHONE ENCOUNTER
----- Message from Lucia Lares sent at 12/21/2018 12:09 PM EST -----  The cyst on kidney grew a tiny bit, I put a message out to dr Maria Isabel Tran to see what we need to do from here

## 2018-12-21 NOTE — PROGRESS NOTES
The cyst on kidney grew a tiny bit, I put a message out to dr Lorri Donovan to see what we need to do from here

## 2018-12-31 ENCOUNTER — TELEPHONE (OUTPATIENT)
Dept: INTERNAL MEDICINE CLINIC | Facility: CLINIC | Age: 43
End: 2018-12-31

## 2018-12-31 NOTE — TELEPHONE ENCOUNTER
----- Message from Francis Villela, Lucia Tran sent at 12/31/2018  1:14 PM EST -----  Let pt know that dr Tere Jimenez recommends another CT in 6 months   I will put a reminder in and track him down when its time again

## 2018-12-31 NOTE — PROGRESS NOTES
Let pt know that dr Audrey Ramsey recommends another CT in 6 months   I will put a reminder in and track him down when its time again

## 2019-06-10 ENCOUNTER — TELEPHONE (OUTPATIENT)
Dept: INTERNAL MEDICINE CLINIC | Facility: CLINIC | Age: 44
End: 2019-06-10

## 2019-06-11 ENCOUNTER — TELEPHONE (OUTPATIENT)
Dept: INTERNAL MEDICINE CLINIC | Facility: CLINIC | Age: 44
End: 2019-06-11

## 2019-06-11 DIAGNOSIS — N28.1 RENAL CYST: Primary | ICD-10-CM

## 2019-06-11 NOTE — TELEPHONE ENCOUNTER
Message for [facility] OR [place]:    Jania Logan called back and I told him that the ultra sound was mailed to his home  [place] said that she's going with the ultra sound for his issues

## 2019-09-03 ENCOUNTER — APPOINTMENT (OUTPATIENT)
Dept: LAB | Facility: CLINIC | Age: 44
End: 2019-09-03
Payer: COMMERCIAL

## 2019-09-03 ENCOUNTER — OFFICE VISIT (OUTPATIENT)
Dept: INTERNAL MEDICINE CLINIC | Facility: CLINIC | Age: 44
End: 2019-09-03
Payer: COMMERCIAL

## 2019-09-03 VITALS
HEIGHT: 70 IN | SYSTOLIC BLOOD PRESSURE: 160 MMHG | HEART RATE: 70 BPM | WEIGHT: 189.8 LBS | DIASTOLIC BLOOD PRESSURE: 94 MMHG | RESPIRATION RATE: 12 BRPM | BODY MASS INDEX: 27.17 KG/M2

## 2019-09-03 DIAGNOSIS — B35.1 ONYCHOMYCOSIS: ICD-10-CM

## 2019-09-03 DIAGNOSIS — M79.605 PAIN OF LEFT LOWER EXTREMITY: ICD-10-CM

## 2019-09-03 DIAGNOSIS — B35.6 JOCK ITCH: ICD-10-CM

## 2019-09-03 DIAGNOSIS — I10 BENIGN HYPERTENSION: ICD-10-CM

## 2019-09-03 DIAGNOSIS — E78.5 DYSLIPIDEMIA: ICD-10-CM

## 2019-09-03 DIAGNOSIS — Z00.00 WELL ADULT EXAM: Primary | ICD-10-CM

## 2019-09-03 DIAGNOSIS — G47.33 OSA (OBSTRUCTIVE SLEEP APNEA): ICD-10-CM

## 2019-09-03 LAB
ALBUMIN SERPL BCP-MCNC: 4.1 G/DL (ref 3.5–5)
ALP SERPL-CCNC: 73 U/L (ref 46–116)
ALT SERPL W P-5'-P-CCNC: 48 U/L (ref 12–78)
ANION GAP SERPL CALCULATED.3IONS-SCNC: 9 MMOL/L (ref 4–13)
AST SERPL W P-5'-P-CCNC: 22 U/L (ref 5–45)
BASOPHILS # BLD AUTO: 0.04 THOUSANDS/ΜL (ref 0–0.1)
BASOPHILS NFR BLD AUTO: 1 % (ref 0–1)
BILIRUB SERPL-MCNC: 0.58 MG/DL (ref 0.2–1)
BUN SERPL-MCNC: 21 MG/DL (ref 5–25)
CALCIUM SERPL-MCNC: 9.3 MG/DL (ref 8.3–10.1)
CHLORIDE SERPL-SCNC: 108 MMOL/L (ref 100–108)
CHOLEST SERPL-MCNC: 221 MG/DL (ref 50–200)
CO2 SERPL-SCNC: 23 MMOL/L (ref 21–32)
CREAT SERPL-MCNC: 0.97 MG/DL (ref 0.6–1.3)
CREAT UR-MCNC: 179 MG/DL
EOSINOPHIL # BLD AUTO: 0.26 THOUSAND/ΜL (ref 0–0.61)
EOSINOPHIL NFR BLD AUTO: 3 % (ref 0–6)
ERYTHROCYTE [DISTWIDTH] IN BLOOD BY AUTOMATED COUNT: 12.4 % (ref 11.6–15.1)
GFR SERPL CREATININE-BSD FRML MDRD: 93 ML/MIN/1.73SQ M
GLUCOSE P FAST SERPL-MCNC: 106 MG/DL (ref 65–99)
HCT VFR BLD AUTO: 50.5 % (ref 36.5–49.3)
HDLC SERPL-MCNC: 39 MG/DL (ref 40–60)
HGB BLD-MCNC: 17.5 G/DL (ref 12–17)
IMM GRANULOCYTES # BLD AUTO: 0.04 THOUSAND/UL (ref 0–0.2)
IMM GRANULOCYTES NFR BLD AUTO: 1 % (ref 0–2)
LDLC SERPL CALC-MCNC: 131 MG/DL (ref 0–100)
LYMPHOCYTES # BLD AUTO: 2.26 THOUSANDS/ΜL (ref 0.6–4.47)
LYMPHOCYTES NFR BLD AUTO: 27 % (ref 14–44)
MCH RBC QN AUTO: 31.8 PG (ref 26.8–34.3)
MCHC RBC AUTO-ENTMCNC: 34.7 G/DL (ref 31.4–37.4)
MCV RBC AUTO: 92 FL (ref 82–98)
MICROALBUMIN UR-MCNC: 160 MG/L (ref 0–20)
MICROALBUMIN/CREAT 24H UR: 89 MG/G CREATININE (ref 0–30)
MONOCYTES # BLD AUTO: 0.58 THOUSAND/ΜL (ref 0.17–1.22)
MONOCYTES NFR BLD AUTO: 7 % (ref 4–12)
NEUTROPHILS # BLD AUTO: 5.1 THOUSANDS/ΜL (ref 1.85–7.62)
NEUTS SEG NFR BLD AUTO: 61 % (ref 43–75)
NONHDLC SERPL-MCNC: 182 MG/DL
NRBC BLD AUTO-RTO: 0 /100 WBCS
PLATELET # BLD AUTO: 225 THOUSANDS/UL (ref 149–390)
PMV BLD AUTO: 10.7 FL (ref 8.9–12.7)
POTASSIUM SERPL-SCNC: 4.2 MMOL/L (ref 3.5–5.3)
PROT SERPL-MCNC: 8.2 G/DL (ref 6.4–8.2)
RBC # BLD AUTO: 5.51 MILLION/UL (ref 3.88–5.62)
SODIUM SERPL-SCNC: 140 MMOL/L (ref 136–145)
TRIGL SERPL-MCNC: 254 MG/DL
TSH SERPL DL<=0.05 MIU/L-ACNC: 2.35 UIU/ML (ref 0.36–3.74)
WBC # BLD AUTO: 8.28 THOUSAND/UL (ref 4.31–10.16)

## 2019-09-03 PROCEDURE — 82043 UR ALBUMIN QUANTITATIVE: CPT | Performed by: INTERNAL MEDICINE

## 2019-09-03 PROCEDURE — 80061 LIPID PANEL: CPT

## 2019-09-03 PROCEDURE — 82570 ASSAY OF URINE CREATININE: CPT | Performed by: INTERNAL MEDICINE

## 2019-09-03 PROCEDURE — 99396 PREV VISIT EST AGE 40-64: CPT | Performed by: INTERNAL MEDICINE

## 2019-09-03 PROCEDURE — 80053 COMPREHEN METABOLIC PANEL: CPT

## 2019-09-03 PROCEDURE — 36415 COLL VENOUS BLD VENIPUNCTURE: CPT

## 2019-09-03 PROCEDURE — 85025 COMPLETE CBC W/AUTO DIFF WBC: CPT

## 2019-09-03 PROCEDURE — 84443 ASSAY THYROID STIM HORMONE: CPT

## 2019-09-03 RX ORDER — NYSTATIN 100000 U/G
CREAM TOPICAL 2 TIMES DAILY
Qty: 30 G | Refills: 0 | Status: SHIPPED | OUTPATIENT
Start: 2019-09-03 | End: 2019-11-09 | Stop reason: SDUPTHER

## 2019-09-03 NOTE — PATIENT INSTRUCTIONS
Check labs, will follow accordingly    Hypertension-suboptimally controlled on present regimen, this is likely multifactorial, I suspect obstructive sleep apnea in addition to overweight status  Patient encouraged to lose weight as able, follow low-sodium diet, increase lisinopril to 40 mg daily, follow home blood pressures and follow-up in about 6 weeks  Aerobic exercise advised with a goal of 30 minutes per day, 5 days per week  Dyslipidemia-check lipid profile and follow accordingly    Redundant posterior pharyngeal soft tissue within it EDS and hypertension suggestive of obstructive sleep apnea-check overnight polysomnogram and follow accordingly  Left leg pain-referral to physical therapy  Under co mycosis-prescribe Lamisil if LFTs okay    Jock and perianal itch-treatment with nystatin cream 2-3 times daily    Follow-up after labs in 6 weeks, sooner as needed

## 2019-09-03 NOTE — PROGRESS NOTES
Assessment/Plan:    Diagnoses and all orders for this visit:    Well adult exam    Pain of left lower extremity  -     Ambulatory referral to Physical Therapy; Future    Benign hypertension  -     CBC and differential; Future  -     Comprehensive metabolic panel; Future  -     TSH, 3rd generation with Free T4 reflex; Future  -     Microalbumin / creatinine urine ratio    Onychomycosis    Jock itch  -     nystatin (MYCOSTATIN) cream; Apply topically 2 (two) times a day    DEBORAH (obstructive sleep apnea)  -     Diagnostic Sleep Study; Future    Dyslipidemia  -     Lipid panel; Future         Patient Instructions   Check labs, will follow accordingly    Hypertension-suboptimally controlled on present regimen, this is likely multifactorial, I suspect obstructive sleep apnea in addition to overweight status  Patient encouraged to lose weight as able, follow low-sodium diet, increase lisinopril to 40 mg daily, follow home blood pressures and follow-up in about 6 weeks  Aerobic exercise advised with a goal of 30 minutes per day, 5 days per week  Dyslipidemia-check lipid profile and follow accordingly    Redundant posterior pharyngeal soft tissue within it EDS and hypertension suggestive of obstructive sleep apnea-check overnight polysomnogram and follow accordingly  Left leg pain-referral to physical therapy  Under co mycosis-prescribe Lamisil if LFTs okay    Jock and perianal itch-treatment with nystatin cream 2-3 times daily    Follow-up after labs in 6 weeks, sooner as needed  Subjective:      Patient ID: Juan C Hollins is a 53 y o  male    Patient comes in for yearly physical    Hypertension-taking medication as directed noting home blood pressures to be between 140 and 150/    He notes occasional a m  Headaches, of feels drowsy during the day, he does not know if he snores as he sleeps alone, there has been no report of witnessed apnea      He is concerned with jock itch and perianal itch that is been going on for about 6 months that was not well controlled with over-the-counter antifungal remedies  He has been concerned with thickened fungal nails that have not responded to over-the-counter remedies    He notes a left-sided leg pain that he relates to his hip being out, he has visited a chiropractor several times to have it put back into place  He notes pain when he lifts his leg or abducts his leg  The pain can be in the upper lateral leg, iliotibial band region, popliteal region and sometimes down to the dorsum of the foot  He does note difficulty getting in and out of a car  He has no pain upon standing  He has no back pain, there is no history of trauma  Current Outpatient Medications:     lisinopril (ZESTRIL) 20 mg tablet, Take 1 tablet (20 mg total) by mouth daily, Disp: 180 tablet, Rfl: 1    nystatin (MYCOSTATIN) cream, Apply topically 2 (two) times a day, Disp: 30 g, Rfl: 0    No results found for this or any previous visit (from the past 1008 hour(s))  The following portions of the patient's history were reviewed and updated as appropriate: allergies, current medications, past family history, past medical history, past social history, past surgical history and problem list      Review of Systems   Constitutional: Negative for appetite change, chills, diaphoresis, fatigue, fever and unexpected weight change  HENT: Negative for congestion, hearing loss and rhinorrhea  Eyes: Negative for visual disturbance  Respiratory: Negative for cough, chest tightness, shortness of breath and wheezing  Cardiovascular: Negative for chest pain, palpitations and leg swelling  Gastrointestinal: Negative for abdominal pain and blood in stool  Endocrine: Negative for cold intolerance, heat intolerance, polydipsia and polyuria  Genitourinary: Negative for difficulty urinating, dysuria, frequency and urgency  Musculoskeletal: Positive for arthralgias and myalgias     Skin: Positive for rash    Neurological: Negative for dizziness, weakness, light-headedness and headaches  Hematological: Does not bruise/bleed easily  Psychiatric/Behavioral: Negative for dysphoric mood and sleep disturbance  Objective:      Vitals:    09/03/19 0952   BP: 160/94   Pulse:    Resp:           Physical Exam   Constitutional: He is oriented to person, place, and time  He appears well-developed and well-nourished  HENT:   Head: Normocephalic and atraumatic  Nose: Nose normal    Mouth/Throat: Oropharynx is clear and moist    Eyes: Pupils are equal, round, and reactive to light  Conjunctivae and EOM are normal  No scleral icterus  Neck: Normal range of motion  Neck supple  No JVD present  No tracheal deviation present  No thyromegaly present  Cardiovascular: Normal rate, regular rhythm and intact distal pulses  Exam reveals no gallop and no friction rub  No murmur heard  Pulmonary/Chest: Effort normal and breath sounds normal  No respiratory distress  He has no wheezes  He has no rales  Abdominal: Soft  Bowel sounds are normal  He exhibits no distension and no mass  There is no tenderness  There is no rebound and no guarding  Musculoskeletal: He exhibits no edema or tenderness  Lymphadenopathy:     He has no cervical adenopathy  Neurological: He is alert and oriented to person, place, and time  No cranial nerve deficit  Skin: Skin is warm and dry  No rash noted  No erythema  Macular, well-circumscribed, erythematous rash with central pallor involving the right greater than left inguinal region and perianal region   Psychiatric: He has a normal mood and affect   His behavior is normal  Judgment and thought content normal

## 2019-09-06 ENCOUNTER — TELEPHONE (OUTPATIENT)
Dept: INTERNAL MEDICINE CLINIC | Facility: CLINIC | Age: 44
End: 2019-09-06

## 2019-09-06 NOTE — TELEPHONE ENCOUNTER
Message for Dr Chloe Guadalupe:    Out of the office until Monday  Diagnostic Sleep Study DENIED  The dr can do a peer to peer by calling this p#: [phone]  I would have to leave a vm w/the ref# and p# for the office; so the review dr can call the ordering dr back    A time; along with the day would have to be given  Ref#: [id number], REQ#: [id number]    Dr Chloe Guadalupe can order a Home Sleep Study instead- this doesn't require an Donnamaria Hai  Please let me know what the dr would like to do      Thank you    Kaylee Chinchilla

## 2019-09-07 DIAGNOSIS — G47.33 OSA (OBSTRUCTIVE SLEEP APNEA): Primary | ICD-10-CM

## 2019-09-10 ENCOUNTER — TELEPHONE (OUTPATIENT)
Dept: SLEEP CENTER | Facility: CLINIC | Age: 44
End: 2019-09-10

## 2019-09-10 NOTE — TELEPHONE ENCOUNTER
----- Message from Jennifer Smith DO sent at 9/10/2019  3:16 PM EDT -----  Chart reviewed  Study approved   ----- Message -----  From: Veronika Ayala MA  Sent: 9/9/2019  10:04 AM EDT  To: Sleep Medicine [place] Provider    This sleep study needs approval      If approved please sign and return to clerical pool  If denied please include reasons why  Also provide alternative testing if warranted  Please sign and return to clerical pool

## 2019-09-12 ENCOUNTER — HOSPITAL ENCOUNTER (OUTPATIENT)
Dept: ULTRASOUND IMAGING | Facility: HOSPITAL | Age: 44
Discharge: HOME | End: 2019-09-12
Payer: COMMERCIAL

## 2019-09-12 DIAGNOSIS — N28.1 RENAL CYST: ICD-10-CM

## 2019-09-12 PROCEDURE — 76770 US EXAM ABDO BACK WALL COMP: CPT

## 2019-09-16 ENCOUNTER — TELEPHONE (OUTPATIENT)
Dept: INTERNAL MEDICINE CLINIC | Facility: CLINIC | Age: 44
End: 2019-09-16

## 2019-09-16 DIAGNOSIS — N28.1 RENAL CYST: Primary | ICD-10-CM

## 2019-09-16 NOTE — TELEPHONE ENCOUNTER
----- Message from [facility], [address] sent at 9/16/2019 12:37 PM EDT -----  minmal change in cyst on kidney will continue to check it annually

## 2019-10-08 ENCOUNTER — HOSPITAL ENCOUNTER (OUTPATIENT)
Dept: SLEEP CENTER | Facility: CLINIC | Age: 44
Discharge: HOME | End: 2019-10-08
Payer: COMMERCIAL

## 2019-10-08 DIAGNOSIS — G47.33 OSA (OBSTRUCTIVE SLEEP APNEA): ICD-10-CM

## 2019-10-08 PROCEDURE — G0399 HOME SLEEP TEST/TYPE 3 PORTA: HCPCS | Performed by: INTERNAL MEDICINE

## 2019-10-08 PROCEDURE — G0399 HOME SLEEP TEST/TYPE 3 PORTA: HCPCS

## 2019-10-09 NOTE — PROGRESS NOTES
Home Sleep Study Documentation    Pre-Sleep Home Study:    Set-up and instructions performed by: BRAYAN Ibrahim    Technician performed demonstration for Patient: yes    Return demonstration performed by Patient: yes    Written instructions provided to Patient: yes    Patient signed consent form: yes        Post-Sleep Home Study:    Additional comments by Patient: none    Home Sleep Study Failed:no:    Failure reason: N/A    Reported or Detected: N/A    Scored by: Aundrea Showxander, BRAYAN GOMEZ

## 2019-10-14 ENCOUNTER — OFFICE VISIT (OUTPATIENT)
Dept: INTERNAL MEDICINE CLINIC | Facility: CLINIC | Age: 44
End: 2019-10-14
Payer: COMMERCIAL

## 2019-10-14 VITALS
SYSTOLIC BLOOD PRESSURE: 128 MMHG | DIASTOLIC BLOOD PRESSURE: 80 MMHG | BODY MASS INDEX: 28.77 KG/M2 | WEIGHT: 201 LBS | RESPIRATION RATE: 14 BRPM | HEART RATE: 80 BPM | HEIGHT: 70 IN

## 2019-10-14 DIAGNOSIS — G47.33 OSA (OBSTRUCTIVE SLEEP APNEA): Primary | ICD-10-CM

## 2019-10-14 DIAGNOSIS — R73.01 IMPAIRED FASTING GLUCOSE: ICD-10-CM

## 2019-10-14 DIAGNOSIS — I10 BENIGN HYPERTENSION: ICD-10-CM

## 2019-10-14 DIAGNOSIS — D75.1 POLYCYTHEMIA: ICD-10-CM

## 2019-10-14 DIAGNOSIS — F17.209 NICOTINE DEPENDENCE WITH NICOTINE-INDUCED DISORDER, UNSPECIFIED NICOTINE PRODUCT TYPE: ICD-10-CM

## 2019-10-14 DIAGNOSIS — E78.2 MIXED HYPERLIPIDEMIA: ICD-10-CM

## 2019-10-14 PROCEDURE — 3008F BODY MASS INDEX DOCD: CPT | Performed by: INTERNAL MEDICINE

## 2019-10-14 PROCEDURE — 3074F SYST BP LT 130 MM HG: CPT | Performed by: INTERNAL MEDICINE

## 2019-10-14 PROCEDURE — 3079F DIAST BP 80-89 MM HG: CPT | Performed by: INTERNAL MEDICINE

## 2019-10-14 PROCEDURE — 99214 OFFICE O/P EST MOD 30 MIN: CPT | Performed by: INTERNAL MEDICINE

## 2019-10-14 RX ORDER — LISINOPRIL 40 MG/1
40 TABLET ORAL DAILY
Qty: 90 TABLET | Refills: 3 | Status: SHIPPED | OUTPATIENT
Start: 2019-10-14 | End: 2019-11-08 | Stop reason: SDUPTHER

## 2019-10-14 NOTE — PROGRESS NOTES
Assessment/Plan:    Diagnoses and all orders for this visit:    DEBORAH (obstructive sleep apnea)    Benign hypertension  -     lisinopril (ZESTRIL) 40 mg tablet; Take 1 tablet (40 mg total) by mouth daily  -     TSH, 3rd generation with Free T4 reflex; Future    Polycythemia    Mixed hyperlipidemia  -     CBC and differential; Future  -     Comprehensive metabolic panel; Future  -     Lipid panel; Future    Nicotine dependence with nicotine-induced disorder, unspecified nicotine product type    Impaired fasting glucose  -     Hemoglobin A1C; Future         Patient Instructions   Lab data reviewed in detail and compared prior    Hypertension appears to be better controlled on double dose of lisinopril, continue with same, continue with weight loss efforts and aerobic exercise with goal 30 minutes per day, 5 days per week    Dyslipidemia with elevated triglycerides, low HDL and borderline LDL-consistent with metabolic syndrome, implications discussed, increased aerobic exercise and weight loss should help, no indication for medication at this time  Impaired fasting glucose-check A1c prior to follow-up    Nicotine dependence-complete abstinence recommended, try to wean from the vape    Presumed obstructive sleep apnea-I will follow results of sleep study accordingly, we did discuss the next step which would be trial of CPAP, I will fax 4 DME and plan to follow-up in 1 month after use to review chip data  Be sure you call your DME supplier to have them fax chip data prior to your follow-up visit  Plan to follow-up after CPAP trial versus after labs in 6 months if sleep study is negative  Subjective:      Patient ID: Salo Pa is a 53 y o  male    Feeling generally well  Home bp's have been 130-140/80-90 range on 40 mg lisinopril  Quit smoking but vaping  Gained some wt  Exercising w/ kayaking, walking,   ? DEBORAH-feeling fatigued, had study, but results not available            Current Outpatient Medications:     lisinopril (ZESTRIL) 40 mg tablet, Take 1 tablet (40 mg total) by mouth daily, Disp: 90 tablet, Rfl: 3    nystatin (MYCOSTATIN) cream, Apply topically 2 (two) times a day, Disp: 30 g, Rfl: 0    Recent Results (from the past 1008 hour(s))   Microalbumin / creatinine urine ratio    Collection Time: 09/03/19 10:27 AM   Result Value Ref Range    Creatinine, Ur 179 0 mg/dL    Microalbum  ,U,Random 160 0 (H) 0 0 - 20 0 mg/L    Microalb Creat Ratio 89 (H) 0 - 30 mg/g creatinine   CBC and differential    Collection Time: 09/03/19 10:27 AM   Result Value Ref Range    WBC 8 28 4 31 - 10 16 Thousand/uL    RBC 5 51 3 88 - 5 62 Million/uL    Hemoglobin 17 5 (H) 12 0 - 17 0 g/dL    Hematocrit 50 5 (H) 36 5 - 49 3 %    MCV 92 82 - 98 fL    MCH 31 8 26 8 - 34 3 pg    MCHC 34 7 31 4 - 37 4 g/dL    RDW 12 4 11 6 - 15 1 %    MPV 10 7 8 9 - 12 7 fL    Platelets 178 773 - 177 Thousands/uL    nRBC 0 /100 WBCs    Neutrophils Relative 61 43 - 75 %    Immat GRANS % 1 0 - 2 %    Lymphocytes Relative 27 14 - 44 %    Monocytes Relative 7 4 - 12 %    Eosinophils Relative 3 0 - 6 %    Basophils Relative 1 0 - 1 %    Neutrophils Absolute 5 10 1 85 - 7 62 Thousands/µL    Immature Grans Absolute 0 04 0 00 - 0 20 Thousand/uL    Lymphocytes Absolute 2 26 0 60 - 4 47 Thousands/µL    Monocytes Absolute 0 58 0 17 - 1 22 Thousand/µL    Eosinophils Absolute 0 26 0 00 - 0 61 Thousand/µL    Basophils Absolute 0 04 0 00 - 0 10 Thousands/µL   Comprehensive metabolic panel    Collection Time: 09/03/19 10:27 AM   Result Value Ref Range    Sodium 140 136 - 145 mmol/L    Potassium 4 2 3 5 - 5 3 mmol/L    Chloride 108 100 - 108 mmol/L    CO2 23 21 - 32 mmol/L    ANION GAP 9 4 - 13 mmol/L    BUN 21 5 - 25 mg/dL    Creatinine 0 97 0 60 - 1 30 mg/dL    Glucose, Fasting 106 (H) 65 - 99 mg/dL    Calcium 9 3 8 3 - 10 1 mg/dL    AST 22 5 - 45 U/L    ALT 48 12 - 78 U/L    Alkaline Phosphatase 73 46 - 116 U/L    Total Protein 8 2 6 4 - 8 2 g/dL    Albumin 4 1 3 5 - 5 0 g/dL    Total Bilirubin 0 58 0 20 - 1 00 mg/dL    eGFR 93 ml/min/1 73sq m   TSH, 3rd generation with Free T4 reflex    Collection Time: 09/03/19 10:27 AM   Result Value Ref Range    TSH 3RD GENERATON 2 350 0 358 - 3 740 uIU/mL   Lipid panel    Collection Time: 09/03/19 10:27 AM   Result Value Ref Range    Cholesterol 221 (H) 50 - 200 mg/dL    Triglycerides 254 (H) <=150 mg/dL    HDL, Direct 39 (L) 40 - 60 mg/dL    LDL Calculated 131 (H) 0 - 100 mg/dL    Non-HDL-Chol (CHOL-HDL) 182 mg/dl       The following portions of the patient's history were reviewed and updated as appropriate: allergies, current medications, past family history, past medical history, past social history, past surgical history and problem list      Review of Systems   Constitutional: Negative for appetite change, chills, diaphoresis, fatigue, fever and unexpected weight change  HENT: Negative for congestion, hearing loss and rhinorrhea  Eyes: Negative for visual disturbance  Respiratory: Negative for cough, chest tightness, shortness of breath and wheezing  Cardiovascular: Negative for chest pain, palpitations and leg swelling  Gastrointestinal: Negative for abdominal pain and blood in stool  Endocrine: Negative for cold intolerance, heat intolerance, polydipsia and polyuria  Genitourinary: Negative for difficulty urinating, dysuria, frequency and urgency  Musculoskeletal: Negative for arthralgias and myalgias  Skin: Negative for rash  Neurological: Negative for dizziness, weakness, light-headedness and headaches  Hematological: Does not bruise/bleed easily  Psychiatric/Behavioral: Negative for dysphoric mood and sleep disturbance  Objective:      Vitals:    10/14/19 1539   BP: 128/80   Pulse:    Resp:           Physical Exam   Constitutional: He is oriented to person, place, and time  He appears well-developed and well-nourished  HENT:   Head: Normocephalic and atraumatic     Nose: Nose normal  Mouth/Throat: Oropharynx is clear and moist    Eyes: Pupils are equal, round, and reactive to light  Conjunctivae and EOM are normal  No scleral icterus  Neck: Normal range of motion  Neck supple  No JVD present  No tracheal deviation present  No thyromegaly present  Cardiovascular: Normal rate, regular rhythm and intact distal pulses  Exam reveals no gallop and no friction rub  No murmur heard  Pulmonary/Chest: Effort normal and breath sounds normal  No respiratory distress  He has no wheezes  He has no rales  Musculoskeletal: He exhibits no edema or deformity  Lymphadenopathy:     He has no cervical adenopathy  Neurological: He is alert and oriented to person, place, and time  No cranial nerve deficit  Skin: Skin is warm and dry  No rash noted  No erythema  No pallor  Psychiatric: He has a normal mood and affect   His behavior is normal  Judgment and thought content normal

## 2019-10-14 NOTE — PATIENT INSTRUCTIONS
Lab data reviewed in detail and compared prior    Hypertension appears to be better controlled on double dose of lisinopril, continue with same, continue with weight loss efforts and aerobic exercise with goal 30 minutes per day, 5 days per week    Dyslipidemia with elevated triglycerides, low HDL and borderline LDL-consistent with metabolic syndrome, implications discussed, increased aerobic exercise and weight loss should help, no indication for medication at this time  Impaired fasting glucose-check A1c prior to follow-up    Nicotine dependence-complete abstinence recommended, try to wean from the vape    Presumed obstructive sleep apnea-I will follow results of sleep study accordingly, we did discuss the next step which would be trial of CPAP, I will fax 4 DME and plan to follow-up in 1 month after use to review chip data  Be sure you call your DME supplier to have them fax chip data prior to your follow-up visit  Plan to follow-up after CPAP trial versus after labs in 6 months if sleep study is negative

## 2019-10-18 ENCOUNTER — EVALUATION (OUTPATIENT)
Dept: PHYSICAL THERAPY | Facility: CLINIC | Age: 44
End: 2019-10-18
Payer: COMMERCIAL

## 2019-10-18 ENCOUNTER — TELEPHONE (OUTPATIENT)
Dept: INTERNAL MEDICINE CLINIC | Facility: CLINIC | Age: 44
End: 2019-10-18

## 2019-10-18 DIAGNOSIS — M79.605 LEFT LEG PAIN: Primary | ICD-10-CM

## 2019-10-18 PROCEDURE — 97161 PT EVAL LOW COMPLEX 20 MIN: CPT | Performed by: PHYSICAL THERAPIST

## 2019-10-18 PROCEDURE — 97110 THERAPEUTIC EXERCISES: CPT | Performed by: PHYSICAL THERAPIST

## 2019-10-18 NOTE — PROGRESS NOTES
PT Evaluation     Today's date: 10/18/2019  Patient name: Eleazar Izquierdo  : 1975  MRN: [id number]  Referring provider: Debi Zuñiga MD  Dx:   Encounter Diagnosis     ICD-10-CM    1  Left leg pain M79 605                   Assessment  Assessment details: Patient is a 54 y/o male with chief complaints of LLE pain  Patient presents with decreased functional mobility due to increased pain, decreased hip and core strength, decreased lumbar ROM associated with possible lumbar DJD and positive LE neural tension  Patient has no signs of ligamentous laxity within left knee  Overall tight within BLE which may be contributing to his knee pain  Patient will benefit from skilled physical therapy to address impairment and improve functional mobility  PT needed to allow for return to maximal function and improve quality of life  Impairments: abnormal or restricted ROM, activity intolerance, impaired physical strength, lacks appropriate home exercise program and pain with function  Understanding of Dx/Px/POC: good   Prognosis: good    Goals  STG within 4 weeks:   1  Patient to be independent in HEP  2  Reduce pain by 50% to improve quality of life  3  Improve hip strength to 5/5  LTG within 8 weeks:   1  Patient to be independent in ADLs/IADLs without difficulty  2  Patient to perform SLR without positive neural tension  3  Patient to be able to elliptical without pain       Plan  Patient would benefit from: skilled physical therapy and PT eval  Planned modality interventions: cryotherapy, hydrotherapy, unattended electrical stimulation, H-Wave, TENS and traction  Planned therapy interventions: therapeutic training, therapeutic exercise, therapeutic activities, stretching, strengthening, postural training, patient education, neuromuscular re-education, manual therapy, joint mobilization, IADL retraining, activity modification, ADL retraining, ADL training, body mechanics training, flexibility, functional ROM exercises, gait training, graded activity, graded exercise, graded motor, home exercise program, abdominal trunk stabilization and Ding taping  Frequency: 2x week  Duration in weeks: 8  Plan of Care beginning date: 10/18/2019  Plan of Care expiration date: 2019  Treatment plan discussed with: patient        Subjective Evaluation    History of Present Illness  Onset date: 20 years ago  Mechanism of injury: Patient is a 54 y/o male with chief complaints of left leg, especially knee, pain  He states he's had pain for 20 years  He reports low back pain on occasion  He reports his "hip goes out" and he sees a chiropractor for that  He reports he's been trying to work out more, but reports increased leg pain/numbness after 20 minutes of the elliptical   He denies any exacerbation/onset of pain recently  He is referred for evaluation and treatment of LLE                    Recurrent probem    Pain  Current pain ratin  At best pain ratin  At worst pain ratin  Quality: dull ache and sharp  Relieving factors: rest  Progression: no change    Social Support  Steps to enter house: yes  Stairs in house: yes   Lives in: multiple-level home    Employment status: working  Exercise history: gym      Diagnostic Tests  No diagnostic tests performed  Treatments  Previous treatment: chiropractic  Patient Goals  Patient goals for therapy: return to sport/leisure activities and decreased pain          Objective     Concurrent Complaints  Negative for bladder dysfunction, bowel dysfunction and saddle (S4) numbness    Neurological Testing     Additional Neurological Details  Unable to illicit LE reflexes due to muscle guarding     Active Range of Motion     Lumbar   Flexion: 50 degrees   Extension: 20 degrees   Left lateral flexion: 20 degrees       Right lateral flexion: 20 degrees   Mechanical Assessment    Cervical      Thoracic    Lying extension: repeated movements  Pain intensity: worse    Lumbar    Lying flexion: repeated movements  Pain intensity: worse    Strength/Myotome Testing     Left Hip   Planes of Motion   Flexion: 4-  Extension: 4-  External rotation: 4  Internal rotation: 4    Right Hip   Planes of Motion   Flexion: 4  Extension: 4  External rotation: 4  Internal rotation: 4    Left Knee   Flexion: 5  Extension: 5    Right Knee   Flexion: 5  Extension: 5    Left Ankle/Foot   Dorsiflexion: 5    Right Ankle/Foot   Dorsiflexion: 5    Tests     Left Knee   Negative anterior drawer, lateral Rashaad, medial Rashaad, posterior drawer, valgus stress test at 0 degrees, valgus stress test at 30 degrees, varus stress test at 0 degrees and varus stress test at 30 degrees  General Comments:      Lumbar Comments  Positive neural tension with B SLR;     No pain with  to thoracic/lumbar spine         Flowsheet Rows      Most Recent Value   PT/OT G-Codes   Current Score  45   Projected Score  62             Precautions: HTN      Manual                                                                                 Increased time spent on patient education with diagnosis, prognosis, goals of therapy, progression of therapy, and plan of care  All questions answered  Patient instructed to call clinic with questions or concerns  Written HEP provided to patient  Exercise Diary  10/18            Recumbent bike NV            Press ups x10/ hold            DKTC X10/ hold            TA engagement 3"x20            Adductor set  3"X20            Hooklying hip abd Red x 20            BLE stretching by PT  NV            Seated nerve glides NV                                                                                                                                                                                                                   Modalities                                                         Assessment: Patient reports feeling overall better from start to finish of session

## 2019-10-18 NOTE — TELEPHONE ENCOUNTER
----- Message from Trina Cool MD sent at 10/18/2019  5:38 PM EDT -----  Let him know-his sleep study was normal, if we have continued suspicion for sleep apnea then we should order a study to be done in a certified Sleep Lab

## 2019-10-22 ENCOUNTER — OFFICE VISIT (OUTPATIENT)
Dept: PHYSICAL THERAPY | Facility: CLINIC | Age: 44
End: 2019-10-22
Payer: COMMERCIAL

## 2019-10-22 DIAGNOSIS — M79.605 LEFT LEG PAIN: Primary | ICD-10-CM

## 2019-10-22 PROCEDURE — 97112 NEUROMUSCULAR REEDUCATION: CPT | Performed by: PHYSICAL THERAPIST

## 2019-10-22 PROCEDURE — 97110 THERAPEUTIC EXERCISES: CPT | Performed by: PHYSICAL THERAPIST

## 2019-10-24 ENCOUNTER — OFFICE VISIT (OUTPATIENT)
Dept: PHYSICAL THERAPY | Facility: CLINIC | Age: 44
End: 2019-10-24
Payer: COMMERCIAL

## 2019-10-24 DIAGNOSIS — M79.605 LEFT LEG PAIN: Primary | ICD-10-CM

## 2019-10-24 PROCEDURE — 97110 THERAPEUTIC EXERCISES: CPT

## 2019-10-24 PROCEDURE — 97112 NEUROMUSCULAR REEDUCATION: CPT

## 2019-10-24 NOTE — PROGRESS NOTES
Daily Note     Today's date: 10/24/2019  Patient name: Grady Vital  : 1975  MRN: [id number]  Referring provider: Mitch Hopkins MD  Dx:   Encounter Diagnosis     ICD-10-CM    1  Left leg pain M79 605                   Subjective: Patient reports that he feels as if his pain is no longer in his legs and that he is having minimal L hip pain  Notes that he has seem improvement since last visit and has been active outside of therapy with no complaints  Objective: See treatment diary below      Assessment: Tolerated treatment well but did demonstrate increased LLE tightness in all directions with relief felt post PROM  Muscular fatigue present towards end of session with no radiating symptoms present  Patient would benefit from continued PT  No complaints post session feeling good overall  Plan: Continue per plan of care  Precautions: HTN      Manual                                                                                 Updated written HEP provided to patient     Exercise Diary  10/18 10/22 10/24          Recumbent bike NV 7' 10'          Press ups x10/ hold 2x10 2x10          DKTC X10/ hold            TA engagement 3"x20 3"x20 3"x20          Adductor set  3"X20 3"x20 5"x20          Hooklying hip abd Red x 20 Red x 20 ea Red x 20ea          BLE stretching by PT- HS, add, piri, gastroc, prone quad/HF  NV x15' x15'          Seated nerve glides NV 2x10 ea 2x10 ea          Prone hip extension  2x10ea 2x10 ea          Clamshells   Red X 10 ea Red x 15 ea                                                                                                                                                                                       Modalities

## 2019-10-29 ENCOUNTER — OFFICE VISIT (OUTPATIENT)
Dept: PHYSICAL THERAPY | Facility: CLINIC | Age: 44
End: 2019-10-29
Payer: COMMERCIAL

## 2019-10-29 DIAGNOSIS — M79.605 LEFT LEG PAIN: Primary | ICD-10-CM

## 2019-10-29 PROCEDURE — 97140 MANUAL THERAPY 1/> REGIONS: CPT | Performed by: PHYSICAL THERAPIST

## 2019-10-29 PROCEDURE — 97112 NEUROMUSCULAR REEDUCATION: CPT | Performed by: PHYSICAL THERAPIST

## 2019-10-29 PROCEDURE — 97110 THERAPEUTIC EXERCISES: CPT | Performed by: PHYSICAL THERAPIST

## 2019-10-29 NOTE — PROGRESS NOTES
Daily Note     Today's date: 10/29/2019  Patient name: Rachel Goff  : 1975  MRN: [id number]  Referring provider: Nita Zaragoza MD  Dx:   Encounter Diagnosis     ICD-10-CM    1  Left leg pain M79 605                   Subjective: Patient states his legs are feeling a lot better  He reports pain is about 75% improved since starting therapy  Objective: See treatment diary below      Assessment: Tolerated treatment well   Increased time spent on manual intervention; patient to perform exercises at home  B hip ROM improves following belt hip mobs and STR with roller to left ITB/ham/glute  Will continue with these interventions next visit if beneficial   No complaints post session and overall improved mobility and flexibility noted  Plan: Continue per plan of care        Precautions: HTN      Manual     10/29         STR w roller to L ITB/ham/glute    8'         B hip belt mobs (inf/lat)    15'                                                    Exercise Diary  10/18 10/22 10/24 10/29         Recumbent bike NV 7' 10' 10'         Press ups x10/ hold 2x10 2x10 HEP         DKTC X10/ hold   HEP         TA engagement 3"x20 3"x20 3"x20 HEP         Adductor set  3"X20 3"x20 5"x20 HEP         Hooklying hip abd Red x 20 Red x 20 ea Red x 20ea HEP         BLE stretching by PT- HS, add, piri, gastroc, prone quad/HF  NV x15' x15' x15'         Seated nerve glides NV 2x10 ea 2x10 ea HEP         Prone hip extension  2x10ea 2x10 ea HEP         Clamshells   Red X 10 ea Red x 15 ea HEP         Standing hip abd/ext    Red 2 x 10 ea         Side step w band     x4         Standing gastroc stretch at wall    5 x 20"         Supine butterfly stretch    5x20"                                                                                                                                  Modalities

## 2019-11-01 ENCOUNTER — OFFICE VISIT (OUTPATIENT)
Dept: PHYSICAL THERAPY | Facility: CLINIC | Age: 44
End: 2019-11-01
Payer: COMMERCIAL

## 2019-11-01 DIAGNOSIS — M79.605 LEFT LEG PAIN: Primary | ICD-10-CM

## 2019-11-01 PROCEDURE — 97110 THERAPEUTIC EXERCISES: CPT | Performed by: PHYSICAL THERAPIST

## 2019-11-01 PROCEDURE — 97140 MANUAL THERAPY 1/> REGIONS: CPT | Performed by: PHYSICAL THERAPIST

## 2019-11-01 NOTE — PROGRESS NOTES
Daily Note     Today's date: 2019  Patient name: Salo Pa  : 1975  MRN: [id number]  Referring provider: Tee Martinez MD  Dx:   Encounter Diagnosis     ICD-10-CM    1  Left leg pain M79 605                   Subjective: Patient states his knee pain that he came here for is much improved  "I don't have that pain that I originally had " He does report groin tightness, which he states has been present since sprinting during a recreational softball game in the past        Objective: See treatment diary below      Assessment: Tolerated treatment well   Improved mobility and BLE flexibility from start to finish of session  Significant groin tightness with abduction; patient denies history of hernias or visible abnormality  He self reports sprinting during recreational softball game in the past, with onset of groin pain/tightness  Appropriate response with kneeling hip flexor stretch and encouraged to perform at home  Patient leaves session without complaint  Plan: Continue per plan of care        Precautions: HTN      Manual     10/29 11/1        STR w roller to L ITB/ham/glute    8' 8'        B hip belt mobs (inf/lat)    15' 10'                                                   Exercise Diary  10/18 10/22 10/24 10/29 11/1        Recumbent bike NV 7' 10' 10' 10'        Press ups x10/ hold 2x10 2x10 HEP         DKTC X10/ hold   HEP         TA engagement 3"x20 3"x20 3"x20 HEP         Adductor set  3"X20 3"x20 5"x20 HEP         Hooklying hip abd Red x 20 Red x 20 ea Red x 20ea HEP         BLE stretching by PT- HS, add, piri, gastroc, prone quad/HF  NV x15' x15' x15'         Seated nerve glides NV 2x10 ea 2x10 ea HEP         Prone hip extension  2x10ea 2x10 ea HEP         Clamshells   Red X 10 ea Red x 15 ea HEP         Standing hip abd/ext    Red 2 x 10 ea Red 3 x 10 ea        Side step w band     x4 Red x 4        Standing gastroc stretch at wall    5 x 20" 5x20"        Supine butterfly stretch    5x20" 5x20"        Kneeling hip flexor stretch      5x20"ea                                                                                                                    Modalities

## 2019-11-05 ENCOUNTER — APPOINTMENT (OUTPATIENT)
Dept: PHYSICAL THERAPY | Facility: CLINIC | Age: 44
End: 2019-11-05
Payer: COMMERCIAL

## 2019-11-07 ENCOUNTER — OFFICE VISIT (OUTPATIENT)
Dept: PHYSICAL THERAPY | Facility: CLINIC | Age: 44
End: 2019-11-07
Payer: COMMERCIAL

## 2019-11-07 DIAGNOSIS — M79.605 LEFT LEG PAIN: Primary | ICD-10-CM

## 2019-11-07 PROCEDURE — 97110 THERAPEUTIC EXERCISES: CPT | Performed by: PHYSICAL THERAPIST

## 2019-11-07 PROCEDURE — 97140 MANUAL THERAPY 1/> REGIONS: CPT | Performed by: PHYSICAL THERAPIST

## 2019-11-07 NOTE — PROGRESS NOTES
Daily Note     Today's date: 2019  Patient name: Miesha Luu  : 1975  MRN: [id number]  Referring provider: Henry Pepe MD  Dx:   Encounter Diagnosis     ICD-10-CM    1  Left leg pain M79 605                   Subjective: Patient states the problem he originally came here for is resolved  He states he cut down a tree this weekend and states,  "My groin is killing me"  Objective: See treatment diary below      Assessment: Tolerated treatment fair  Treatment focuses on manual intervention to improve adductor flexibility and reduce pain  He exhibits significant tightness to palpation bilaterally of adductors, minimal tightness within hip flexors  Slight improvement with manual intervention including STR with roller  He is interested in buying a roller for self rolling and is instructed in several options  Plan: Continue per plan of care        Precautions: HTN      Manual     10/29 11/1 11/7       STR w roller to L ITB/ham/glute    8' 8' B adductors 10'       B hip belt mobs (inf/lat)    15' 10' 10'                                                  Exercise Diary  10/18 10/22 10/24 10/29 11/1 11/7       Recumbent bike NV 7' 10' 10' 10' 10'       Press ups x10/ hold 2x10 2x10 HEP         DKTC X10/ hold   HEP         TA engagement 3"x20 3"x20 3"x20 HEP         Adductor set  3"X20 3"x20 5"x20 HEP         Hooklying hip abd Red x 20 Red x 20 ea Red x 20ea HEP         BLE stretching by PT- HS, add, piri, gastroc, prone quad/HF  NV x15' x15' x15'  Self piri 4 x 15"       Seated nerve glides NV 2x10 ea 2x10 ea HEP         Prone hip extension  2x10ea 2x10 ea HEP         Clamshells   Red X 10 ea Red x 15 ea HEP         Standing hip abd/ext    Red 2 x 10 ea Red 3 x 10 ea        Side step w band     x4 Red x 4        Standing gastroc stretch at wall    5 x 20" 5x20"        Supine butterfly stretch    5x20" 5x20" 5x20"       Kneeling hip flexor stretch      5x20"ea Modalities       11/7       B adductor MHP      10'

## 2019-11-08 DIAGNOSIS — I10 BENIGN HYPERTENSION: ICD-10-CM

## 2019-11-08 DIAGNOSIS — B35.6 JOCK ITCH: ICD-10-CM

## 2019-11-08 RX ORDER — NYSTATIN 100000 U/G
CREAM TOPICAL 2 TIMES DAILY
Qty: 30 G | Refills: 0 | Status: CANCELLED | OUTPATIENT
Start: 2019-11-08

## 2019-11-09 DIAGNOSIS — B35.6 JOCK ITCH: ICD-10-CM

## 2019-11-10 RX ORDER — NYSTATIN 100000 U/G
CREAM TOPICAL 2 TIMES DAILY
Qty: 30 G | Refills: 2 | Status: SHIPPED | OUTPATIENT
Start: 2019-11-10 | End: 2021-08-31 | Stop reason: CLARIF

## 2019-11-11 RX ORDER — LISINOPRIL 40 MG/1
40 TABLET ORAL DAILY
Qty: 90 TABLET | Refills: 0 | Status: SHIPPED | OUTPATIENT
Start: 2019-11-11 | End: 2020-12-30 | Stop reason: SDUPTHER

## 2019-11-18 ENCOUNTER — TELEPHONE (OUTPATIENT)
Dept: INTERNAL MEDICINE CLINIC | Facility: CLINIC | Age: 44
End: 2019-11-18

## 2019-11-18 NOTE — TELEPHONE ENCOUNTER
Faxed clinical to [facility] for an approval with a Diag Sleep Study  Pt is aneesh for this Thurs 11/21 for the study- at [address]  It might take up to 15 days for an approval   I can call back and inform them to expedite the case  F#: [id number]  Pt had a Home Sleep Study done and 2 office notes were Faxed  Tenneile S; out pt rep with [facility] called me about the auth

## 2019-11-19 ENCOUNTER — TELEPHONE (OUTPATIENT)
Dept: INTERNAL MEDICINE CLINIC | Facility: CLINIC | Age: 44
End: 2019-11-19

## 2019-11-19 ENCOUNTER — OFFICE VISIT (OUTPATIENT)
Dept: PHYSICAL THERAPY | Facility: CLINIC | Age: 44
End: 2019-11-19
Payer: COMMERCIAL

## 2019-11-19 DIAGNOSIS — M79.605 LEFT LEG PAIN: Primary | ICD-10-CM

## 2019-11-19 PROCEDURE — 97110 THERAPEUTIC EXERCISES: CPT | Performed by: PHYSICAL THERAPIST

## 2019-11-19 PROCEDURE — 97140 MANUAL THERAPY 1/> REGIONS: CPT | Performed by: PHYSICAL THERAPIST

## 2019-11-19 NOTE — PROGRESS NOTES
Daily Note     Today's date: 2019  Patient name: Nicole Cuevas  : 1975  MRN: [id number]  Referring provider: Conrado Messer MD  Dx:   Encounter Diagnosis     ICD-10-CM    1  Left leg pain M79 605                   Subjective: Patient states he only had one instance of leg pain while he was away on business  He reports he did "extra stretching" that night and the pain subsided  Patient states he was pleasantly surprised as to how well he did while he was away from therapy for a week  He also states he went to his first yoga class today and "felt great" afterward  Objective: See treatment diary below      Assessment: Tolerated treatment well  No exacerbation of symptoms with focus on manual stretching and manual intervention  Discussed plan of care with patient  Patient to continue with yoga and daily stretches  Will re-assess at next visit via discussion of patient's symptoms this week with start of yoga  Plan: May consider decrease to one time per week with therapy if symptoms remain self-manageable throughout this week  Precautions: HTN      Manual     10/29 11/1 11/7 11/19      STR w roller to L ITB/ham/glute    8' 8' B adductors 10' B adductors 10'      B hip belt mobs (inf/lat)    15' 10' 10'                                                Increased time spent on patient education this visit     Exercise Diary  10/18 10/22 10/24 10/29 11/1 11/7 11/19      Recumbent bike NV 7' 10' 10' 10' 10' 10'      Press ups x10/ hold 2x10 2x10 HEP         DKTC X10/ hold   HEP         TA engagement 3"x20 3"x20 3"x20 HEP         Adductor set  3"X20 3"x20 5"x20 HEP         Hooklying hip abd Red x 20 Red x 20 ea Red x 20ea HEP         BLE stretching by PT- HS, add, piri, gastroc, prone quad/HF  NV x15' x15' x15'  Self piri 4 x 15" 15'      Seated nerve glides NV 2x10 ea 2x10 ea HEP         Prone hip extension  2x10ea 2x10 ea HEP         Clamshells   Red X 10 ea Red x 15 ea HEP         Standing hip abd/ext    Red 2 x 10 ea Red 3 x 10 ea        Side step w band     x4 Red x 4        Standing gastroc stretch at wall    5 x 20" 5x20"        Supine butterfly stretch    5x20" 5x20" 5x20"       Kneeling hip flexor stretch      5x20"ea                                                                                                                    Modalities       11/7 11/19      B adductor MHP      10' 10'

## 2019-11-20 NOTE — TELEPHONE ENCOUNTER
Checked on auth today for Diag Sleep Study; still in review; pending  Received clinical by Fax on Monday  I also called Jeri [facility] [place] and informed her that the sleep study was still in review   She'll check on it tomorrow and I will too- then give the pt a call on the status

## 2019-11-21 ENCOUNTER — TELEPHONE (OUTPATIENT)
Dept: SLEEP CENTER | Facility: CLINIC | Age: 44
End: 2019-11-21

## 2019-11-21 ENCOUNTER — OFFICE VISIT (OUTPATIENT)
Dept: INTERNAL MEDICINE CLINIC | Facility: CLINIC | Age: 44
End: 2019-11-21
Payer: COMMERCIAL

## 2019-11-21 VITALS
BODY MASS INDEX: 29.03 KG/M2 | RESPIRATION RATE: 12 BRPM | HEART RATE: 88 BPM | HEIGHT: 70 IN | SYSTOLIC BLOOD PRESSURE: 140 MMHG | WEIGHT: 202.8 LBS | DIASTOLIC BLOOD PRESSURE: 84 MMHG

## 2019-11-21 DIAGNOSIS — B35.1 ONYCHOMYCOSIS: Primary | ICD-10-CM

## 2019-11-21 PROCEDURE — 99214 OFFICE O/P EST MOD 30 MIN: CPT | Performed by: INTERNAL MEDICINE

## 2019-11-21 RX ORDER — TERBINAFINE HYDROCHLORIDE 250 MG/1
250 TABLET ORAL DAILY
Qty: 90 TABLET | Refills: 0 | Status: SHIPPED | OUTPATIENT
Start: 2019-11-21 | End: 2020-02-19

## 2019-11-21 NOTE — TELEPHONE ENCOUNTER
----- Message from Wilbert Harris MD sent at 11/20/2019  3:55 PM EST -----  approved  ----- Message -----  From: Eladio Burnham MA  Sent: 11/20/2019   3:44 PM EST  To: Wilbert Harris MD    This sleep study needs approval      If approved please sign and return to clerical pool  If denied please include reasons why  Also provide alternative testing if warranted  Please sign and return to clerical pool

## 2019-11-21 NOTE — TELEPHONE ENCOUNTER
Checked status on Diagnostic Sleep Study- still in review  It can take up to 7 business days to be approved  I spoke to the pt and he said he's not doing the study- since he did the Home Study       I informed him that he should call Central Scheduling to cancel the test

## 2019-11-21 NOTE — PROGRESS NOTES
Assessment/Plan:    Diagnoses and all orders for this visit:    Onychomycosis  -     terbinafine (LamISIL) 250 mg tablet; Take 1 tablet (250 mg total) by mouth daily         Patient Instructions   Hypertension-home blood pressures appear improved on increased dose of lisinopril, blood pressure slightly elevated today due to the significant emotional stress, no change in medication  Continue with exercise and weight loss as able  Continue on low-sodium diet  Contact me if home blood pressure start creeping up above 140/90  Otherwise routine follow-up after labs in April  Tobacco cessation again advised  Sleep study was negative-continue to monitor signs and symptoms of obstructive sleep apnea, notify me if any witnessed apnea, persistent a m  Headaches, daytime somnolence, drowsy driving  Should that occur I would pursue diagnostic study in a sleep center rather than home sleep test     Hyperlipidemia and impaired fasting glucose will be followed up after lab work in April    Onychomycosis in addition to jock itch-Lamisil 250 daily x3 months, expect 1 year until the nail has completely improved-monitor for signs and symptoms of liver toxicity as discussed and contact me should that occur  Subjective:      Patient ID: Diaz Wolff is a 53 y o  male    Emotionally distraught today d/t finding [facility] w/ alcohol and abnl behavior  Prior to yesterday things were good  Exercising 5 d per week, but had to cut back cardio d/t tight adductors  Back to smoking cigs or vaping        Jock itch is acting up d/t heating pad w/ PT     HTN-taking rx as directed, home bp's 120-130's/80's          Current Outpatient Medications:     lisinopril (ZESTRIL) 40 mg tablet, Take 1 tablet (40 mg total) by mouth daily, Disp: 90 tablet, Rfl: 0    nystatin (MYCOSTATIN) cream, Apply topically 2 (two) times a day, Disp: 30 g, Rfl: 2    terbinafine (LamISIL) 250 mg tablet, Take 1 tablet (250 mg total) by mouth daily, Disp: 90 tablet, Rfl: 0    No results found for this or any previous visit (from the past 1008 hour(s))  The following portions of the patient's history were reviewed and updated as appropriate: allergies, current medications, past family history, past medical history, past social history, past surgical history and problem list      Review of Systems   Constitutional: Negative for appetite change, chills, diaphoresis, fatigue, fever and unexpected weight change  HENT: Negative for congestion, hearing loss and rhinorrhea  Eyes: Negative for visual disturbance  Respiratory: Negative for cough, chest tightness, shortness of breath and wheezing  Cardiovascular: Negative for chest pain, palpitations and leg swelling  Gastrointestinal: Negative for abdominal pain and blood in stool  Endocrine: Negative for cold intolerance, heat intolerance, polydipsia and polyuria  Genitourinary: Negative for difficulty urinating, dysuria, frequency and urgency  Musculoskeletal: Positive for arthralgias  Negative for myalgias  Skin: Negative for rash  Neurological: Negative for dizziness, weakness, light-headedness and headaches  Hematological: Does not bruise/bleed easily  Psychiatric/Behavioral: Negative for dysphoric mood and sleep disturbance  Objective:      Vitals:    11/21/19 1105   BP: 140/84   Pulse: 88   Resp: 12          Physical Exam   Constitutional: He is oriented to person, place, and time  He appears well-developed and well-nourished  HENT:   Head: Normocephalic and atraumatic  Nose: Nose normal    Mouth/Throat: Oropharynx is clear and moist    Eyes: Pupils are equal, round, and reactive to light  Conjunctivae and EOM are normal  No scleral icterus  Neck: Normal range of motion  Neck supple  No JVD present  No tracheal deviation present  No thyromegaly present  Cardiovascular: Normal rate, regular rhythm and intact distal pulses  Exam reveals no gallop and no friction rub     No murmur heard  Pulmonary/Chest: Effort normal and breath sounds normal  No respiratory distress  He has no wheezes  He has no rales  Musculoskeletal: He exhibits no edema or deformity  Lymphadenopathy:     He has no cervical adenopathy  Neurological: He is alert and oriented to person, place, and time  No cranial nerve deficit  Skin: Skin is warm and dry  No rash noted  No erythema  No pallor  Thickened mycotic appearing right great toenail approximately distal 55% as well as pinky nail and 3rd toenail   Psychiatric: He has a normal mood and affect   His behavior is normal  Judgment and thought content normal

## 2019-11-21 NOTE — TELEPHONE ENCOUNTER
I started an [facility] for Diag Sleep Study  Clinical was Faxed on: 11/18  Today it was still pending review  It takes up to 7 business days for an approval      The pt said he didn't want this study done because he did the Home Study  The Diag Study was canceled for today to be done  Site for Sleep Study is [place]

## 2019-11-21 NOTE — PATIENT INSTRUCTIONS
Hypertension-home blood pressures appear improved on increased dose of lisinopril, blood pressure slightly elevated today due to the significant emotional stress, no change in medication  Continue with exercise and weight loss as able  Continue on low-sodium diet  Contact me if home blood pressure start creeping up above 140/90  Otherwise routine follow-up after labs in April  Tobacco cessation again advised  Sleep study was negative-continue to monitor signs and symptoms of obstructive sleep apnea, notify me if any witnessed apnea, persistent a m  Headaches, daytime somnolence, drowsy driving  Should that occur I would pursue diagnostic study in a sleep center rather than home sleep test     Hyperlipidemia and impaired fasting glucose will be followed up after lab work in April    Onychomycosis in addition to jock itch-Lamisil 250 daily x3 months, expect 1 year until the nail has completely improved-monitor for signs and symptoms of liver toxicity as discussed and contact me should that occur

## 2019-11-22 ENCOUNTER — OFFICE VISIT (OUTPATIENT)
Dept: PHYSICAL THERAPY | Facility: CLINIC | Age: 44
End: 2019-11-22
Payer: COMMERCIAL

## 2019-11-22 DIAGNOSIS — M79.605 LEFT LEG PAIN: Primary | ICD-10-CM

## 2019-11-22 PROCEDURE — 97110 THERAPEUTIC EXERCISES: CPT | Performed by: PHYSICAL THERAPIST

## 2019-11-22 NOTE — PROGRESS NOTES
PT Discharge    Today's date: 2019  Patient name: J Luis Jones  : 1975  MRN: [id number]  Referring provider: Zenaida Olmedo MD  Dx:   Encounter Diagnosis     ICD-10-CM    1  Left leg pain M79 605                   Assessment  Assessment details: Patient is a 52 y/o male with resolved LLE pain  Since starting therapy, patient exhibits decreased pain, improved functional mobility as evidenced by patient report and score on FOTO, improved lumbar and hip mobility  He is independent in HEP and in agreement with d/c to HEP  Patient has met maximal potential and has returned to independent gym routine, including beginning yoga  All questions answered  Patient instructed to call clinic with questions or concerns  Impairments: abnormal or restricted ROM, activity intolerance, impaired physical strength, lacks appropriate home exercise program and pain with function  Understanding of Dx/Px/POC: good   Prognosis: good    Goals  STG within 4 weeks:   1  Patient to be independent in HEP  -met  2  Reduce pain by 50% to improve quality of life  -met   3  Improve hip strength to 5/5   -met  LTG within 8 weeks:   1  Patient to be independent in ADLs/IADLs without difficulty  -met  2  Patient to perform SLR without positive neural tension  -met  3  Patient to be able to elliptical without pain  -met    Plan  Plan details: D/C to HEP  Updated HEP provided to patient     Patient would benefit from: skilled physical therapy and PT eval  Planned modality interventions: cryotherapy, hydrotherapy, unattended electrical stimulation, H-Wave, TENS and traction  Planned therapy interventions: therapeutic training, therapeutic exercise, therapeutic activities, stretching, strengthening, postural training, patient education, neuromuscular re-education, manual therapy, joint mobilization, IADL retraining, activity modification, ADL retraining, ADL training, body mechanics training, flexibility, functional ROM exercises, gait training, graded activity, graded exercise, graded motor, home exercise program, abdominal trunk stabilization and Ding taping  Frequency: 2x week  Treatment plan discussed with: patient        Subjective Evaluation    History of Present Illness  Onset date: 20 years ago  Mechanism of injury: Patient is a 54 y/o male with chief complaints of left leg, especially knee, pain  He states he's had pain for 20 years  He reports low back pain on occasion  He reports his "hip goes out" and he sees a chiropractor for that  He reports he's been trying to work out more, but reports increased leg pain/numbness after 20 minutes of the elliptical   He denies any exacerbation/onset of pain recently  He is referred for evaluation and treatment of LLE  Upon discharge on 19, patient states he is pleased with his progress in therapy  He reports less pain, but still limited ROM of hips  He states he is independent in HEP and has begun yoga classes at the gym  "I feel great after yoga today " He saw physician this week who was also pleased with his progress  Patient in agreement with d/c to [facility]                    Recurrent probem    Pain  Current pain ratin  At best pain ratin  At worst pain ratin  Quality: dull ache and sharp  Relieving factors: rest  Progression: improved    Social Support  Steps to enter house: yes  Stairs in house: yes   Lives in: multiple-level home    Employment status: working  Exercise history: gym      Diagnostic Tests  No diagnostic tests performed  Treatments  Previous treatment: chiropractic  Patient Goals  Patient goals for therapy: return to sport/leisure activities and decreased pain          Objective     Concurrent Complaints  Negative for bladder dysfunction, bowel dysfunction and saddle (S4) numbness    Active Range of Motion     Lumbar   Flexion: 50 degrees   Extension: 20 degrees   Left lateral flexion: 20 degrees       Right lateral flexion: 20 degrees Additional Active Range of Motion Details  Not updated formally at d/c eval    Passive Range of Motion   Left Hip   Flexion: 115 degrees   Abduction: 20 degrees   External rotation (90/90): 28 degrees   Internal rotation (90/90): 10 degrees     Right Hip   Flexion: 115 degrees   Abduction: 17 degrees   External rotation (90/90): 25 degrees   Internal rotation (90/90): 8 degrees     Strength/Myotome Testing     Left Hip   Planes of Motion   Flexion: 5  Extension: 5  External rotation: 5  Internal rotation: 5    Right Hip   Planes of Motion   Flexion: 5  Extension: 5  External rotation: 5  Internal rotation: 5    Left Knee   Flexion: 5  Extension: 5    Right Knee   Flexion: 5  Extension: 5    Left Ankle/Foot   Dorsiflexion: 5    Right Ankle/Foot   Dorsiflexion: 5    Tests     Left Knee   Negative anterior drawer, lateral Rashaad, medial Rashaad, posterior drawer, valgus stress test at 0 degrees, valgus stress test at 30 degrees, varus stress test at 0 degrees and varus stress test at 30 degrees  General Comments:      Lumbar Comments      No pain with  to thoracic/lumbar spine                Precautions: HTN      Manual     10/29 11/1 11/7 11/19 11/22     STR w roller to L ITB/ham/glute    8' 8' B adductors 10' B adductors 10' decline     B hip belt mobs (inf/lat)    13' 10' 10'                                                Increased time spent on patient education this visit  Updated HEP provided to patient  Discharge evaluation performed this visit     Exercise Diary  10/18 10/22 10/24 10/29 11/1 11/7 11/19 11/22     Recumbent bike NV 7' 10' 10' 10' 10' 10' 10'     Press ups x10/ hold 2x10 2x10 HEP    review     DKTC X10/ hold   HEP    review     TA engagement 3"x20 3"x20 3"x20 HEP    review     Adductor set  3"X20 3"x20 5"x20 HEP    review     Hooklying hip abd Red x 20 Red x 20 ea Red x 20ea HEP    review     BLE stretching by PT- HS, add, piri, gastroc, prone quad/HF  NV x15' x15' x15'  Self piri 4 x 15" 15' 15'     Seated nerve glides NV 2x10 ea 2x10 ea HEP    review     Prone hip extension  2x10ea 2x10 ea HEP    review     Clamshells   Red X 10 ea Red x 15 ea HEP    review     Standing hip abd/ext    Red 2 x 10 ea Red 3 x 10 ea   review     Side step w band     x4 Red x 4   review     Standing gastroc stretch at wall    5 x 20" 5x20"   review     Supine butterfly stretch    5x20" 5x20" 5x20"  review     Kneeling hip flexor stretch      5x20"ea   review                                                                                                                 Modalities       11/7 11/19      B adductor MHP      10' 10'

## 2019-11-26 ENCOUNTER — APPOINTMENT (OUTPATIENT)
Dept: PHYSICAL THERAPY | Facility: CLINIC | Age: 44
End: 2019-11-26
Payer: COMMERCIAL

## 2019-11-29 ENCOUNTER — OFFICE VISIT (OUTPATIENT)
Dept: PHYSICAL THERAPY | Facility: CLINIC | Age: 44
End: 2019-11-29
Payer: COMMERCIAL

## 2020-04-24 ENCOUNTER — TELEMEDICINE (OUTPATIENT)
Dept: INTERNAL MEDICINE CLINIC | Facility: CLINIC | Age: 45
End: 2020-04-24
Payer: COMMERCIAL

## 2020-04-24 VITALS — BODY MASS INDEX: 28.7 KG/M2 | WEIGHT: 200 LBS

## 2020-04-24 DIAGNOSIS — I10 BENIGN HYPERTENSION: ICD-10-CM

## 2020-04-24 DIAGNOSIS — R73.01 IMPAIRED FASTING GLUCOSE: Primary | ICD-10-CM

## 2020-04-24 DIAGNOSIS — E78.2 MIXED HYPERLIPIDEMIA: ICD-10-CM

## 2020-04-24 PROCEDURE — 99214 OFFICE O/P EST MOD 30 MIN: CPT | Performed by: INTERNAL MEDICINE

## 2020-12-30 DIAGNOSIS — I10 BENIGN HYPERTENSION: ICD-10-CM

## 2020-12-30 RX ORDER — LISINOPRIL 40 MG/1
40 TABLET ORAL DAILY
Qty: 90 TABLET | Refills: 3 | Status: SHIPPED | OUTPATIENT
Start: 2020-12-30 | End: 2021-07-30 | Stop reason: SDUPTHER

## 2021-01-15 ENCOUNTER — TELEPHONE (OUTPATIENT)
Dept: INTERNAL MEDICINE CLINIC | Facility: CLINIC | Age: 46
End: 2021-01-15

## 2021-01-15 DIAGNOSIS — R73.01 IMPAIRED FASTING GLUCOSE: ICD-10-CM

## 2021-01-15 DIAGNOSIS — E78.2 MIXED HYPERLIPIDEMIA: ICD-10-CM

## 2021-01-15 DIAGNOSIS — I10 BENIGN HYPERTENSION: Primary | ICD-10-CM

## 2021-01-15 NOTE — TELEPHONE ENCOUNTER
Patient is coming in for a physical on Tuesday, no labs are ordered  Want him to have labs before his appointment?

## 2021-01-19 ENCOUNTER — OFFICE VISIT (OUTPATIENT)
Dept: INTERNAL MEDICINE CLINIC | Facility: CLINIC | Age: 46
End: 2021-01-19
Payer: COMMERCIAL

## 2021-01-19 ENCOUNTER — APPOINTMENT (OUTPATIENT)
Dept: LAB | Facility: CLINIC | Age: 46
End: 2021-01-19
Payer: COMMERCIAL

## 2021-01-19 VITALS
DIASTOLIC BLOOD PRESSURE: 82 MMHG | TEMPERATURE: 97.4 F | BODY MASS INDEX: 29.49 KG/M2 | HEART RATE: 84 BPM | SYSTOLIC BLOOD PRESSURE: 132 MMHG | WEIGHT: 206 LBS | HEIGHT: 70 IN | RESPIRATION RATE: 16 BRPM

## 2021-01-19 DIAGNOSIS — G47.33 OSA (OBSTRUCTIVE SLEEP APNEA): ICD-10-CM

## 2021-01-19 DIAGNOSIS — I10 BENIGN HYPERTENSION: ICD-10-CM

## 2021-01-19 DIAGNOSIS — E78.2 MIXED HYPERLIPIDEMIA: ICD-10-CM

## 2021-01-19 DIAGNOSIS — R73.01 IMPAIRED FASTING GLUCOSE: Primary | ICD-10-CM

## 2021-01-19 DIAGNOSIS — N28.1 RENAL CYST: ICD-10-CM

## 2021-01-19 DIAGNOSIS — R73.01 IMPAIRED FASTING GLUCOSE: ICD-10-CM

## 2021-01-19 DIAGNOSIS — F17.209 NICOTINE DEPENDENCE WITH NICOTINE-INDUCED DISORDER, UNSPECIFIED NICOTINE PRODUCT TYPE: ICD-10-CM

## 2021-01-19 DIAGNOSIS — Z01.818 PREOP EXAMINATION: ICD-10-CM

## 2021-01-19 DIAGNOSIS — N28.1 COMPLEX RENAL CYST: ICD-10-CM

## 2021-01-19 LAB
ALBUMIN SERPL BCP-MCNC: 4.1 G/DL (ref 3.5–5)
ALP SERPL-CCNC: 77 U/L (ref 46–116)
ALT SERPL W P-5'-P-CCNC: 72 U/L (ref 12–78)
ANION GAP SERPL CALCULATED.3IONS-SCNC: 2 MMOL/L (ref 4–13)
AST SERPL W P-5'-P-CCNC: 28 U/L (ref 5–45)
BASOPHILS # BLD AUTO: 0.04 THOUSANDS/ΜL (ref 0–0.1)
BASOPHILS NFR BLD AUTO: 1 % (ref 0–1)
BILIRUB SERPL-MCNC: 0.43 MG/DL (ref 0.2–1)
BUN SERPL-MCNC: 19 MG/DL (ref 5–25)
CALCIUM SERPL-MCNC: 9.3 MG/DL (ref 8.3–10.1)
CHLORIDE SERPL-SCNC: 107 MMOL/L (ref 100–108)
CHOLEST SERPL-MCNC: 200 MG/DL (ref 50–200)
CO2 SERPL-SCNC: 29 MMOL/L (ref 21–32)
CREAT SERPL-MCNC: 1.05 MG/DL (ref 0.6–1.3)
EOSINOPHIL # BLD AUTO: 0.22 THOUSAND/ΜL (ref 0–0.61)
EOSINOPHIL NFR BLD AUTO: 3 % (ref 0–6)
ERYTHROCYTE [DISTWIDTH] IN BLOOD BY AUTOMATED COUNT: 12.1 % (ref 11.6–15.1)
EST. AVERAGE GLUCOSE BLD GHB EST-MCNC: 111 MG/DL
GFR SERPL CREATININE-BSD FRML MDRD: 84 ML/MIN/1.73SQ M
GLUCOSE SERPL-MCNC: 97 MG/DL (ref 65–140)
HBA1C MFR BLD: 5.5 %
HCT VFR BLD AUTO: 49 % (ref 36.5–49.3)
HDLC SERPL-MCNC: 34 MG/DL
HGB BLD-MCNC: 17.2 G/DL (ref 12–17)
IMM GRANULOCYTES # BLD AUTO: 0.04 THOUSAND/UL (ref 0–0.2)
IMM GRANULOCYTES NFR BLD AUTO: 1 % (ref 0–2)
LDLC SERPL CALC-MCNC: 90 MG/DL (ref 0–100)
LYMPHOCYTES # BLD AUTO: 2.42 THOUSANDS/ΜL (ref 0.6–4.47)
LYMPHOCYTES NFR BLD AUTO: 32 % (ref 14–44)
MCH RBC QN AUTO: 32.1 PG (ref 26.8–34.3)
MCHC RBC AUTO-ENTMCNC: 35.1 G/DL (ref 31.4–37.4)
MCV RBC AUTO: 92 FL (ref 82–98)
MONOCYTES # BLD AUTO: 0.63 THOUSAND/ΜL (ref 0.17–1.22)
MONOCYTES NFR BLD AUTO: 8 % (ref 4–12)
NEUTROPHILS # BLD AUTO: 4.12 THOUSANDS/ΜL (ref 1.85–7.62)
NEUTS SEG NFR BLD AUTO: 55 % (ref 43–75)
NONHDLC SERPL-MCNC: 166 MG/DL
NRBC BLD AUTO-RTO: 0 /100 WBCS
PLATELET # BLD AUTO: 201 THOUSANDS/UL (ref 149–390)
PMV BLD AUTO: 10.4 FL (ref 8.9–12.7)
POTASSIUM SERPL-SCNC: 4.4 MMOL/L (ref 3.5–5.3)
PROT SERPL-MCNC: 7.9 G/DL (ref 6.4–8.2)
RBC # BLD AUTO: 5.35 MILLION/UL (ref 3.88–5.62)
SODIUM SERPL-SCNC: 138 MMOL/L (ref 136–145)
TRIGL SERPL-MCNC: 378 MG/DL
TSH SERPL DL<=0.05 MIU/L-ACNC: 2.4 UIU/ML (ref 0.36–3.74)
WBC # BLD AUTO: 7.47 THOUSAND/UL (ref 4.31–10.16)

## 2021-01-19 PROCEDURE — 93000 ELECTROCARDIOGRAM COMPLETE: CPT | Performed by: NURSE PRACTITIONER

## 2021-01-19 PROCEDURE — 80061 LIPID PANEL: CPT

## 2021-01-19 PROCEDURE — 3075F SYST BP GE 130 - 139MM HG: CPT | Performed by: NURSE PRACTITIONER

## 2021-01-19 PROCEDURE — 85025 COMPLETE CBC W/AUTO DIFF WBC: CPT

## 2021-01-19 PROCEDURE — 1036F TOBACCO NON-USER: CPT | Performed by: NURSE PRACTITIONER

## 2021-01-19 PROCEDURE — 80053 COMPREHEN METABOLIC PANEL: CPT

## 2021-01-19 PROCEDURE — 99212 OFFICE O/P EST SF 10 MIN: CPT | Performed by: NURSE PRACTITIONER

## 2021-01-19 PROCEDURE — 83036 HEMOGLOBIN GLYCOSYLATED A1C: CPT

## 2021-01-19 PROCEDURE — 3725F SCREEN DEPRESSION PERFORMED: CPT | Performed by: NURSE PRACTITIONER

## 2021-01-19 PROCEDURE — 3008F BODY MASS INDEX DOCD: CPT | Performed by: NURSE PRACTITIONER

## 2021-01-19 PROCEDURE — 36415 COLL VENOUS BLD VENIPUNCTURE: CPT

## 2021-01-19 PROCEDURE — 3079F DIAST BP 80-89 MM HG: CPT | Performed by: NURSE PRACTITIONER

## 2021-01-19 PROCEDURE — 84443 ASSAY THYROID STIM HORMONE: CPT

## 2021-01-19 NOTE — PATIENT INSTRUCTIONS
Hypertension stable on current medication     history of renal cyst due for ultrasound order provided     severe osteoarthritis right hip patient is anticipating a right hip replacement on February 22nd  He is due for routine labs which will he complete today, will need chest x-ray in light of history of tobacco abuse but he has been smoke free for the past 1 month congratulations!    In office EKG stable except for peaked T-waves, check electrolytes  Once labs and chest x-ray are obtained and reviewed clearance will be provided at that time    Patient understands no aspirin or anti inflammatory 1 week prior to surgery and to hold lisinopril 24 hours prior to surgery

## 2021-01-21 ENCOUNTER — TELEPHONE (OUTPATIENT)
Dept: INTERNAL MEDICINE CLINIC | Facility: CLINIC | Age: 46
End: 2021-01-21

## 2021-01-21 NOTE — TELEPHONE ENCOUNTER
----- Message from Jerald Koroma, [address] sent at 1/21/2021  4:34 PM EST -----  Notify his labs are good, electrolytes were normal,   Chol was high- especially the triglyerides - this comes from starches, bread pasta, rice , potatoes, he should cut back     We will clear him for surgery after cxr

## 2021-01-28 ENCOUNTER — TELEPHONE (OUTPATIENT)
Dept: INTERNAL MEDICINE CLINIC | Facility: CLINIC | Age: 46
End: 2021-01-28

## 2021-01-28 NOTE — TELEPHONE ENCOUNTER
Called Dr Henry office spoke to Lui ([phone]) requested report be faxed over, she stated she will send the office note as well

## 2021-07-30 ENCOUNTER — IMMUNIZATIONS (OUTPATIENT)
Dept: FAMILY MEDICINE CLINIC | Facility: HOSPITAL | Age: 46
End: 2021-07-30

## 2021-07-30 DIAGNOSIS — I10 BENIGN HYPERTENSION: ICD-10-CM

## 2021-07-30 DIAGNOSIS — Z23 ENCOUNTER FOR IMMUNIZATION: Primary | ICD-10-CM

## 2021-07-30 PROCEDURE — 0001A SARS-COV-2 / COVID-19 MRNA VACCINE (PFIZER-BIONTECH) 30 MCG: CPT

## 2021-07-30 PROCEDURE — 91300 SARS-COV-2 / COVID-19 MRNA VACCINE (PFIZER-BIONTECH) 30 MCG: CPT

## 2021-08-01 RX ORDER — LISINOPRIL 40 MG/1
40 TABLET ORAL DAILY
Qty: 90 TABLET | Refills: 0 | Status: SHIPPED | OUTPATIENT
Start: 2021-08-01

## 2021-08-31 ENCOUNTER — APPOINTMENT (OUTPATIENT)
Dept: LAB | Facility: CLINIC | Age: 46
End: 2021-08-31
Payer: COMMERCIAL

## 2021-08-31 ENCOUNTER — OFFICE VISIT (OUTPATIENT)
Dept: INTERNAL MEDICINE CLINIC | Facility: CLINIC | Age: 46
End: 2021-08-31
Payer: COMMERCIAL

## 2021-08-31 VITALS
BODY MASS INDEX: 26.83 KG/M2 | RESPIRATION RATE: 12 BRPM | OXYGEN SATURATION: 96 % | SYSTOLIC BLOOD PRESSURE: 152 MMHG | HEART RATE: 81 BPM | HEIGHT: 70 IN | WEIGHT: 187.4 LBS | TEMPERATURE: 97.6 F | DIASTOLIC BLOOD PRESSURE: 88 MMHG

## 2021-08-31 DIAGNOSIS — N28.1 KIDNEY CYSTS: ICD-10-CM

## 2021-08-31 DIAGNOSIS — R91.1 PULMONARY NODULE: ICD-10-CM

## 2021-08-31 DIAGNOSIS — Z11.59 NEED FOR HEPATITIS C SCREENING TEST: ICD-10-CM

## 2021-08-31 DIAGNOSIS — N28.1 COMPLEX RENAL CYST: ICD-10-CM

## 2021-08-31 DIAGNOSIS — F17.209 NICOTINE DEPENDENCE WITH NICOTINE-INDUCED DISORDER, UNSPECIFIED NICOTINE PRODUCT TYPE: ICD-10-CM

## 2021-08-31 DIAGNOSIS — I10 BENIGN HYPERTENSION: ICD-10-CM

## 2021-08-31 DIAGNOSIS — E78.2 MIXED HYPERLIPIDEMIA: ICD-10-CM

## 2021-08-31 DIAGNOSIS — R73.01 IMPAIRED FASTING GLUCOSE: ICD-10-CM

## 2021-08-31 DIAGNOSIS — R73.01 IMPAIRED FASTING GLUCOSE: Primary | ICD-10-CM

## 2021-08-31 DIAGNOSIS — G47.33 OSA (OBSTRUCTIVE SLEEP APNEA): ICD-10-CM

## 2021-08-31 DIAGNOSIS — Z00.00 PHYSICAL EXAM: ICD-10-CM

## 2021-08-31 DIAGNOSIS — Z72.0 TOBACCO ABUSE: ICD-10-CM

## 2021-08-31 LAB
ALBUMIN SERPL BCP-MCNC: 4.5 G/DL (ref 3.5–5)
ALP SERPL-CCNC: 82 U/L (ref 46–116)
ALT SERPL W P-5'-P-CCNC: 38 U/L (ref 12–78)
ANION GAP SERPL CALCULATED.3IONS-SCNC: 8 MMOL/L (ref 4–13)
AST SERPL W P-5'-P-CCNC: 21 U/L (ref 5–45)
BACTERIA UR QL AUTO: ABNORMAL /HPF
BILIRUB SERPL-MCNC: 0.86 MG/DL (ref 0.2–1)
BILIRUB UR QL STRIP: NEGATIVE
BUN SERPL-MCNC: 23 MG/DL (ref 5–25)
CALCIUM SERPL-MCNC: 9.2 MG/DL (ref 8.3–10.1)
CHLORIDE SERPL-SCNC: 107 MMOL/L (ref 100–108)
CHOLEST SERPL-MCNC: 190 MG/DL (ref 50–200)
CLARITY UR: CLEAR
CO2 SERPL-SCNC: 22 MMOL/L (ref 21–32)
COLOR UR: ABNORMAL
CREAT SERPL-MCNC: 0.98 MG/DL (ref 0.6–1.3)
CREAT UR-MCNC: 229 MG/DL
EST. AVERAGE GLUCOSE BLD GHB EST-MCNC: 100 MG/DL
GFR SERPL CREATININE-BSD FRML MDRD: 91 ML/MIN/1.73SQ M
GLUCOSE P FAST SERPL-MCNC: 79 MG/DL (ref 65–99)
GLUCOSE UR STRIP-MCNC: NEGATIVE MG/DL
HBA1C MFR BLD: 5.1 %
HCV AB SER QL: NORMAL
HDLC SERPL-MCNC: 36 MG/DL
HGB UR QL STRIP.AUTO: NEGATIVE
HYALINE CASTS #/AREA URNS LPF: ABNORMAL /LPF
KETONES UR STRIP-MCNC: ABNORMAL MG/DL
LDLC SERPL CALC-MCNC: 125 MG/DL (ref 0–100)
LEUKOCYTE ESTERASE UR QL STRIP: ABNORMAL
MICROALBUMIN UR-MCNC: 394 MG/L (ref 0–20)
MICROALBUMIN/CREAT 24H UR: 172 MG/G CREATININE (ref 0–30)
NITRITE UR QL STRIP: NEGATIVE
NON-SQ EPI CELLS URNS QL MICRO: ABNORMAL /HPF
NONHDLC SERPL-MCNC: 154 MG/DL
PH UR STRIP.AUTO: 6 [PH]
POTASSIUM SERPL-SCNC: 3.7 MMOL/L (ref 3.5–5.3)
PROT SERPL-MCNC: 8.5 G/DL (ref 6.4–8.2)
PROT UR STRIP-MCNC: ABNORMAL MG/DL
RBC #/AREA URNS AUTO: ABNORMAL /HPF
SODIUM SERPL-SCNC: 137 MMOL/L (ref 136–145)
SP GR UR STRIP.AUTO: 1.03 (ref 1–1.03)
TRIGL SERPL-MCNC: 147 MG/DL
TSH SERPL DL<=0.05 MIU/L-ACNC: 1.99 UIU/ML (ref 0.36–3.74)
UROBILINOGEN UR QL STRIP.AUTO: 1 E.U./DL
WBC #/AREA URNS AUTO: ABNORMAL /HPF

## 2021-08-31 PROCEDURE — 84443 ASSAY THYROID STIM HORMONE: CPT

## 2021-08-31 PROCEDURE — 82043 UR ALBUMIN QUANTITATIVE: CPT | Performed by: NURSE PRACTITIONER

## 2021-08-31 PROCEDURE — 36415 COLL VENOUS BLD VENIPUNCTURE: CPT

## 2021-08-31 PROCEDURE — 80061 LIPID PANEL: CPT

## 2021-08-31 PROCEDURE — 80053 COMPREHEN METABOLIC PANEL: CPT

## 2021-08-31 PROCEDURE — 85025 COMPLETE CBC W/AUTO DIFF WBC: CPT

## 2021-08-31 PROCEDURE — 99396 PREV VISIT EST AGE 40-64: CPT | Performed by: NURSE PRACTITIONER

## 2021-08-31 PROCEDURE — 4004F PT TOBACCO SCREEN RCVD TLK: CPT | Performed by: NURSE PRACTITIONER

## 2021-08-31 PROCEDURE — 82570 ASSAY OF URINE CREATININE: CPT | Performed by: NURSE PRACTITIONER

## 2021-08-31 PROCEDURE — 86803 HEPATITIS C AB TEST: CPT

## 2021-08-31 PROCEDURE — 83036 HEMOGLOBIN GLYCOSYLATED A1C: CPT

## 2021-08-31 PROCEDURE — 3008F BODY MASS INDEX DOCD: CPT | Performed by: NURSE PRACTITIONER

## 2021-08-31 PROCEDURE — 81001 URINALYSIS AUTO W/SCOPE: CPT | Performed by: NURSE PRACTITIONER

## 2021-08-31 RX ORDER — VARENICLINE TARTRATE 25 MG
KIT ORAL
Qty: 53 TABLET | Refills: 0 | Status: SHIPPED | OUTPATIENT
Start: 2021-08-31 | End: 2021-10-05 | Stop reason: CLARIF

## 2021-08-31 NOTE — PROGRESS NOTES
Assessment/Plan:    Patient Instructions    Hypertension blood pressure not at goal today, does admit that he smoked cigarette right before coming in  Monitor blood pressures at home if consistently greater than 140/90 contact us     urinary frequency and urgency, he does smoke in addition to drink a large amount of caffeine, recommend curtailing discontinuing both  Check urine with next set of labs  Complicated renal cyst overdue for follow-up prescription has been provided     18 mm calcified pulmonary nodule, was recommended to have PET scan back in February did not complete at that time, for now we will repeat CT if evidence of abnormal finding will follow-up with PET- patient currently without any symptoms     weight loss patient has intentionally made efforts to lose weight including dietary changes and exercise     tobacco abuse as above start Chantix risk benefit side effects discussed follow up in 1 month     anxiety stable with medical marijuana         Diagnoses and all orders for this visit:    Impaired fasting glucose  -     Hemoglobin A1C; Future    DEBORAH (obstructive sleep apnea)    Benign hypertension    Complex renal cyst    Mixed hyperlipidemia  -     CBC and differential; Future  -     Comprehensive metabolic panel; Future  -     Lipid panel; Future  -     TSH, 3rd generation with Free T4 reflex; Future    Nicotine dependence with nicotine-induced disorder, unspecified nicotine product type    Tobacco abuse  -     varenicline (CHANTIX PAIGE) 0 5 MG X 11 & 1 MG X 42 tablet; Take one 0 5 mg tablet by mouth once daily for 3 days, then one 0 5 mg tablet by mouth twice daily for 4 days, then one 1 mg tablet by mouth twice daily   -     UA (URINE) with reflex to Scope    Kidney cysts  -     US retroperitoneal complete; Future    Pulmonary nodule  -     CT chest wo contrast; Future    Need for hepatitis C screening test  -     Hepatitis C antibody;  Future         Subjective:      Patient ID: Fatuma Leon Tiffanie Sarah is a 48 y o  male     Patient is here for routine follow-up, he brings me up to speed on multiple issues, 1st off he had right hip replacement back in February he did have some muscle atrophy so his physical therapy was extended but he is getting back to his baseline  In regards to his anxiety he now has medical marijuana and doing well on it  He continues to smoke but would like to quit would like to discuss alternatives, no home blood pressure admits that he had a cigarette right before coming to the office of his blood pressure is elevated  Admits to a significant amount of caffeine intake  Also notes urinary urgency and frequency  He is adopted  He recently was able to find his biological father unfortunately he is to cease he passed away from bladder cancer had history of coronary disease he is in touch with his biological father sister  During his preop testing back in January he was found to have an 18 mm pulmonary nodule it was suggested to have PET scan which she has not completed to this date  He is also overdue for his kidney ultrasound        Current Outpatient Medications:     lisinopril (ZESTRIL) 40 mg tablet, Take 1 tablet (40 mg total) by mouth daily, Disp: 90 tablet, Rfl: 0    varenicline (CHANTIX PAIGE) 0 5 MG X 11 & 1 MG X 42 tablet, Take one 0 5 mg tablet by mouth once daily for 3 days, then one 0 5 mg tablet by mouth twice daily for 4 days, then one 1 mg tablet by mouth twice daily  , Disp: 53 tablet, Rfl: 0    No results found for this or any previous visit (from the past 1008 hour(s))  The following portions of the patient's history were reviewed and updated as appropriate: allergies, current medications, past family history, past medical history, past social history, past surgical history and problem list      Review of Systems   Constitutional: Negative for appetite change, chills, diaphoresis, fatigue, fever and unexpected weight change     HENT: Negative for postnasal drip and sneezing  Eyes: Negative for visual disturbance  Respiratory: Negative for chest tightness and shortness of breath  Cardiovascular: Negative for chest pain, palpitations and leg swelling  Gastrointestinal: Negative for abdominal pain and blood in stool  Endocrine: Negative for cold intolerance, heat intolerance, polydipsia, polyphagia and polyuria  Genitourinary: Positive for frequency and urgency  Negative for difficulty urinating and dysuria  Musculoskeletal: Negative for arthralgias and myalgias  Skin: Negative for rash and wound  Neurological: Negative for dizziness, weakness, light-headedness and headaches  Hematological: Negative for adenopathy  Psychiatric/Behavioral: Negative for confusion, dysphoric mood and sleep disturbance  The patient is not nervous/anxious  Objective:      /88 (BP Location: Left arm, Patient Position: Sitting, Cuff Size: Standard)   Pulse 81   Temp 97 6 °F (36 4 °C) (Tympanic)   Resp 12   Ht 5' 10" (1 778 m)   Wt 85 kg (187 lb 6 4 oz)   SpO2 96%   BMI 26 89 kg/m²        Physical Exam  Constitutional:       General: He is not in acute distress  Appearance: He is well-developed  He is not diaphoretic  HENT:      Head: Normocephalic and atraumatic  Nose: Nose normal    Eyes:      Conjunctiva/sclera: Conjunctivae normal       Pupils: Pupils are equal, round, and reactive to light  Neck:      Thyroid: No thyromegaly  Vascular: No JVD  Trachea: No tracheal deviation  Cardiovascular:      Rate and Rhythm: Normal rate and regular rhythm  Heart sounds: Normal heart sounds  No murmur heard  No friction rub  No gallop  Pulmonary:      Effort: Pulmonary effort is normal  No respiratory distress  Breath sounds: Normal breath sounds  No wheezing or rales  Abdominal:      General: Bowel sounds are normal  There is no distension  Palpations: Abdomen is soft  Tenderness:  There is no abdominal tenderness  Musculoskeletal:         General: Normal range of motion  Cervical back: Normal range of motion and neck supple  Lymphadenopathy:      Cervical: No cervical adenopathy  Skin:     General: Skin is warm and dry  Findings: No rash  Neurological:      Mental Status: He is alert and oriented to person, place, and time  Cranial Nerves: No cranial nerve deficit  Psychiatric:         Behavior: Behavior normal          Thought Content: Thought content normal          Judgment: Judgment normal      BMI Counseling: Body mass index is 26 89 kg/m²  The BMI is above normal  Nutrition recommendations include decreasing portion sizes and decreasing fast food intake  Exercise recommendations include exercising 3-5 times per week  No pharmacotherapy was ordered  Tobacco Cessation Counseling: Tobacco cessation counseling was provided  The patient is sincerely urged to quit consumption of tobacco  He is ready to quit tobacco  Medication options and side effects of medication discussed  Patient refused medication  Varenicline (chantix) was prescribed

## 2021-08-31 NOTE — PATIENT INSTRUCTIONS
Hypertension blood pressure not at goal today, does admit that he smoked cigarette right before coming in  Monitor blood pressures at home if consistently greater than 140/90 contact us     urinary frequency and urgency, he does smoke in addition to drink a large amount of caffeine, recommend curtailing discontinuing both  Check urine with next set of labs        Complicated renal cyst overdue for follow-up prescription has been provided     18 mm calcified pulmonary nodule, was recommended to have PET scan back in February did not complete at that time, for now we will repeat CT if evidence of abnormal finding will follow-up with PET- patient currently without any symptoms     weight loss patient has intentionally made efforts to lose weight including dietary changes and exercise     tobacco abuse as above start Chantix risk benefit side effects discussed follow up in 1 month     anxiety stable with medical marijuana

## 2021-09-01 LAB
BASOPHILS # BLD AUTO: 0.05 THOUSANDS/ΜL (ref 0–0.1)
BASOPHILS NFR BLD AUTO: 1 % (ref 0–1)
EOSINOPHIL # BLD AUTO: 0.23 THOUSAND/ΜL (ref 0–0.61)
EOSINOPHIL NFR BLD AUTO: 3 % (ref 0–6)
ERYTHROCYTE [DISTWIDTH] IN BLOOD BY AUTOMATED COUNT: 12.7 % (ref 11.6–15.1)
HCT VFR BLD AUTO: 51.8 % (ref 36.5–49.3)
HGB BLD-MCNC: 17.7 G/DL (ref 12–17)
IMM GRANULOCYTES # BLD AUTO: 0.02 THOUSAND/UL (ref 0–0.2)
IMM GRANULOCYTES NFR BLD AUTO: 0 % (ref 0–2)
LYMPHOCYTES # BLD AUTO: 2.53 THOUSANDS/ΜL (ref 0.6–4.47)
LYMPHOCYTES NFR BLD AUTO: 34 % (ref 14–44)
MCH RBC QN AUTO: 32.1 PG (ref 26.8–34.3)
MCHC RBC AUTO-ENTMCNC: 34.2 G/DL (ref 31.4–37.4)
MCV RBC AUTO: 94 FL (ref 82–98)
MONOCYTES # BLD AUTO: 0.46 THOUSAND/ΜL (ref 0.17–1.22)
MONOCYTES NFR BLD AUTO: 6 % (ref 4–12)
NEUTROPHILS # BLD AUTO: 4.13 THOUSANDS/ΜL (ref 1.85–7.62)
NEUTS SEG NFR BLD AUTO: 56 % (ref 43–75)
NRBC BLD AUTO-RTO: 0 /100 WBCS
PLATELET # BLD AUTO: 229 THOUSANDS/UL (ref 149–390)
PMV BLD AUTO: 11 FL (ref 8.9–12.7)
RBC # BLD AUTO: 5.52 MILLION/UL (ref 3.88–5.62)
WBC # BLD AUTO: 7.42 THOUSAND/UL (ref 4.31–10.16)

## 2021-09-09 ENCOUNTER — HOSPITAL ENCOUNTER (OUTPATIENT)
Dept: CT IMAGING | Facility: CLINIC | Age: 46
Discharge: HOME | End: 2021-09-09
Payer: COMMERCIAL

## 2021-09-09 DIAGNOSIS — R91.1 PULMONARY NODULE: ICD-10-CM

## 2021-09-09 PROCEDURE — G1004 CDSM NDSC: HCPCS

## 2021-09-09 PROCEDURE — 71250 CT THORAX DX C-: CPT

## 2021-09-16 ENCOUNTER — TELEPHONE (OUTPATIENT)
Dept: INTERNAL MEDICINE CLINIC | Facility: CLINIC | Age: 46
End: 2021-09-16

## 2021-09-16 ENCOUNTER — APPOINTMENT (OUTPATIENT)
Dept: LAB | Facility: CLINIC | Age: 46
End: 2021-09-16
Payer: COMMERCIAL

## 2021-09-16 DIAGNOSIS — R82.81 STERILE PYURIA: ICD-10-CM

## 2021-09-16 PROCEDURE — 86480 TB TEST CELL IMMUN MEASURE: CPT

## 2021-09-16 PROCEDURE — 36415 COLL VENOUS BLD VENIPUNCTURE: CPT

## 2021-09-19 LAB
GAMMA INTERFERON BACKGROUND BLD IA-ACNC: 0.02 IU/ML
M TB IFN-G BLD-IMP: NEGATIVE
M TB IFN-G CD4+ BCKGRND COR BLD-ACNC: 0.03 IU/ML
M TB IFN-G CD4+ BCKGRND COR BLD-ACNC: 0.03 IU/ML
MITOGEN IGNF BCKGRD COR BLD-ACNC: >10 IU/ML

## 2021-09-21 ENCOUNTER — TELEPHONE (OUTPATIENT)
Dept: INTERNAL MEDICINE CLINIC | Facility: CLINIC | Age: 46
End: 2021-09-21

## 2021-09-21 NOTE — TELEPHONE ENCOUNTER
----- Message from Carmel Zepeda, [address] Rimma Tran sent at 9/21/2021 12:00 PM EDT -----  Please let him know I sent an email thru [facility]

## 2021-10-05 ENCOUNTER — OFFICE VISIT (OUTPATIENT)
Dept: INTERNAL MEDICINE CLINIC | Facility: CLINIC | Age: 46
End: 2021-10-05
Payer: COMMERCIAL

## 2021-10-05 VITALS
TEMPERATURE: 97.8 F | SYSTOLIC BLOOD PRESSURE: 132 MMHG | HEIGHT: 70 IN | DIASTOLIC BLOOD PRESSURE: 76 MMHG | BODY MASS INDEX: 26.26 KG/M2 | HEART RATE: 56 BPM | WEIGHT: 183.4 LBS | OXYGEN SATURATION: 96 %

## 2021-10-05 DIAGNOSIS — Z23 ENCOUNTER FOR IMMUNIZATION: ICD-10-CM

## 2021-10-05 PROCEDURE — 4004F PT TOBACCO SCREEN RCVD TLK: CPT | Performed by: NURSE PRACTITIONER

## 2021-10-05 PROCEDURE — 99214 OFFICE O/P EST MOD 30 MIN: CPT | Performed by: NURSE PRACTITIONER

## 2021-10-05 PROCEDURE — 3008F BODY MASS INDEX DOCD: CPT | Performed by: NURSE PRACTITIONER

## 2021-10-05 RX ORDER — IBUPROFEN 200 MG
200 TABLET ORAL EVERY 6 HOURS PRN
COMMUNITY

## 2021-10-05 RX ORDER — ACETAMINOPHEN 325 MG/1
650 TABLET ORAL EVERY 6 HOURS PRN
COMMUNITY

## 2021-10-12 ENCOUNTER — HOSPITAL ENCOUNTER (OUTPATIENT)
Dept: RADIOLOGY | Age: 46
Discharge: HOME | End: 2021-10-12
Payer: COMMERCIAL

## 2021-10-12 DIAGNOSIS — R91.1 PULMONARY NODULE 1 CM OR GREATER IN DIAMETER: ICD-10-CM

## 2021-10-12 DIAGNOSIS — F17.209 NICOTINE DEPENDENCE WITH NICOTINE-INDUCED DISORDER, UNSPECIFIED NICOTINE PRODUCT TYPE: ICD-10-CM

## 2021-10-12 LAB — GLUCOSE SERPL-MCNC: 92 MG/DL (ref 65–140)

## 2021-10-12 PROCEDURE — A9552 F18 FDG: HCPCS

## 2021-10-12 PROCEDURE — G1004 CDSM NDSC: HCPCS

## 2021-10-12 PROCEDURE — 78815 PET IMAGE W/CT SKULL-THIGH: CPT

## 2021-10-12 PROCEDURE — 82948 REAGENT STRIP/BLOOD GLUCOSE: CPT

## 2021-10-14 ENCOUNTER — TELEPHONE (OUTPATIENT)
Dept: INTERNAL MEDICINE CLINIC | Facility: CLINIC | Age: 46
End: 2021-10-14

## 2021-11-17 ENCOUNTER — OFFICE VISIT (OUTPATIENT)
Dept: UROLOGY | Facility: CLINIC | Age: 46
End: 2021-11-17
Payer: COMMERCIAL

## 2021-11-17 VITALS
BODY MASS INDEX: 26.48 KG/M2 | SYSTOLIC BLOOD PRESSURE: 146 MMHG | WEIGHT: 185 LBS | HEIGHT: 70 IN | DIASTOLIC BLOOD PRESSURE: 84 MMHG | HEART RATE: 67 BPM

## 2021-11-17 DIAGNOSIS — N28.1 COMPLEX RENAL CYST: ICD-10-CM

## 2021-11-17 DIAGNOSIS — Z12.5 SCREENING FOR PROSTATE CANCER: ICD-10-CM

## 2021-11-17 DIAGNOSIS — R39.9 LOWER URINARY TRACT SYMPTOMS (LUTS): Primary | ICD-10-CM

## 2021-11-17 PROCEDURE — 4004F PT TOBACCO SCREEN RCVD TLK: CPT | Performed by: PHYSICIAN ASSISTANT

## 2021-11-17 PROCEDURE — 99203 OFFICE O/P NEW LOW 30 MIN: CPT | Performed by: PHYSICIAN ASSISTANT

## 2021-11-17 PROCEDURE — 3008F BODY MASS INDEX DOCD: CPT | Performed by: PHYSICIAN ASSISTANT

## 2022-05-03 ENCOUNTER — APPOINTMENT (OUTPATIENT)
Dept: LAB | Facility: CLINIC | Age: 47
End: 2022-05-03
Payer: COMMERCIAL

## 2022-05-03 ENCOUNTER — HOSPITAL ENCOUNTER (OUTPATIENT)
Dept: CT IMAGING | Facility: HOSPITAL | Age: 47
Discharge: HOME | End: 2022-05-03
Payer: COMMERCIAL

## 2022-05-03 DIAGNOSIS — R91.1 PULMONARY NODULE 1 CM OR GREATER IN DIAMETER: ICD-10-CM

## 2022-05-03 DIAGNOSIS — N28.1 COMPLEX RENAL CYST: ICD-10-CM

## 2022-05-03 DIAGNOSIS — Z12.5 SCREENING FOR PROSTATE CANCER: ICD-10-CM

## 2022-05-03 LAB
ANION GAP SERPL CALCULATED.3IONS-SCNC: 2 MMOL/L (ref 4–13)
BUN SERPL-MCNC: 23 MG/DL (ref 5–25)
CALCIUM SERPL-MCNC: 9.4 MG/DL (ref 8.3–10.1)
CHLORIDE SERPL-SCNC: 108 MMOL/L (ref 100–108)
CO2 SERPL-SCNC: 30 MMOL/L (ref 21–32)
CREAT SERPL-MCNC: 1.06 MG/DL (ref 0.6–1.3)
GFR SERPL CREATININE-BSD FRML MDRD: 82 ML/MIN/1.73SQ M
GLUCOSE P FAST SERPL-MCNC: 109 MG/DL (ref 65–99)
POTASSIUM SERPL-SCNC: 4 MMOL/L (ref 3.5–5.3)
PSA SERPL-MCNC: 1.6 NG/ML (ref 0–4)
SODIUM SERPL-SCNC: 140 MMOL/L (ref 136–145)

## 2022-05-03 PROCEDURE — 36415 COLL VENOUS BLD VENIPUNCTURE: CPT

## 2022-05-03 PROCEDURE — 74170 CT ABD WO CNTRST FLWD CNTRST: CPT

## 2022-05-03 PROCEDURE — G0103 PSA SCREENING: HCPCS

## 2022-05-03 PROCEDURE — 80048 BASIC METABOLIC PNL TOTAL CA: CPT

## 2022-05-03 PROCEDURE — 71250 CT THORAX DX C-: CPT

## 2022-05-03 PROCEDURE — G1004 CDSM NDSC: HCPCS

## 2022-05-03 RX ADMIN — IOHEXOL 100 ML: 350 INJECTION, SOLUTION INTRAVENOUS at 11:42

## 2022-05-09 ENCOUNTER — TELEPHONE (OUTPATIENT)
Dept: INTERNAL MEDICINE CLINIC | Facility: CLINIC | Age: 47
End: 2022-05-09

## 2022-05-09 NOTE — TELEPHONE ENCOUNTER
----- Message from Franklin Sultana sent at 5/9/2022  1:54 PM EDT -----  Notify his CT remains stable   Repeat in 1 year and if its still stable we do not have to continue to monitor

## 2022-05-11 ENCOUNTER — TELEPHONE (OUTPATIENT)
Dept: INTERNAL MEDICINE CLINIC | Facility: CLINIC | Age: 47
End: 2022-05-11

## 2022-05-11 NOTE — TELEPHONE ENCOUNTER
Re:   CT results  Provider's message/results/instructions relayed in full detail  LVM asking pt to return call w/any questions

## 2022-06-06 ENCOUNTER — OFFICE VISIT (OUTPATIENT)
Dept: UROLOGY | Facility: CLINIC | Age: 47
End: 2022-06-06
Payer: COMMERCIAL

## 2022-06-06 VITALS
HEIGHT: 70 IN | SYSTOLIC BLOOD PRESSURE: 122 MMHG | HEART RATE: 62 BPM | WEIGHT: 191 LBS | DIASTOLIC BLOOD PRESSURE: 74 MMHG | BODY MASS INDEX: 27.35 KG/M2 | OXYGEN SATURATION: 98 %

## 2022-06-06 DIAGNOSIS — N28.1 COMPLEX RENAL CYST: Primary | ICD-10-CM

## 2022-06-06 DIAGNOSIS — Z12.5 SCREENING FOR PROSTATE CANCER: ICD-10-CM

## 2022-06-06 PROCEDURE — 3008F BODY MASS INDEX DOCD: CPT | Performed by: PHYSICIAN ASSISTANT

## 2022-06-06 PROCEDURE — 1036F TOBACCO NON-USER: CPT | Performed by: PHYSICIAN ASSISTANT

## 2022-06-06 PROCEDURE — 99213 OFFICE O/P EST LOW 20 MIN: CPT | Performed by: PHYSICIAN ASSISTANT

## 2022-06-06 NOTE — PROGRESS NOTES
6/6/2022      Chief Complaint   Patient presents with    Follow-up         Assessment and Plan    50 y o  male     1  Right renal cyst  - previously seen on CT scan complex renal cyst  - repeat imaging recently showing Bosniak 2 lesion requiring no further workup necessary  - due to previous complex renal cyst, would recommend following up in 1 year with ultrasound of the kidneys prior to visit  - call with any questions or concerns in the meantime  - All questions answered; patient understands and agrees with plan    2  Lower urinary tract symptoms  - continues to decline pharmacotherapy  - declines BRITTANI today  - PSA 1 6      History of Present Illness  Christine Leigh is a 50 y o  male patient with history of complex renal cyst and lower urinary tract symptoms here for follow up  Patient has longstanding history of complex renal cyst   Patient had repeat imaging with CT scan showing slight increase in size of renal cyst, however classifying this as a Bosniak 2 lesion requiring no further workup  Patient denies symptoms at this time  Denies flank pain, gross hematuria, suprapubic pressure, fever, chills, nausea, vomiting  Patient does have mild urinary hesitancy in the morning and does not wish to proceed with pharmacotherapy at this time  Patient did have recent PSA of 1 6  Denies family history of  malignancies  Review of Systems   Constitutional: Negative for activity change, appetite change, chills and fever  HENT: Negative for congestion and trouble swallowing  Respiratory: Negative for cough and shortness of breath  Cardiovascular: Negative for chest pain, palpitations and leg swelling  Gastrointestinal: Negative for abdominal pain, constipation, diarrhea, nausea and vomiting  Genitourinary: Negative for difficulty urinating, dysuria, flank pain, frequency, hematuria and urgency  Musculoskeletal: Negative for back pain and gait problem  Skin: Negative for wound  Allergic/Immunologic: Negative for immunocompromised state  Neurological: Negative for dizziness and syncope  Hematological: Does not bruise/bleed easily  Psychiatric/Behavioral: Negative for confusion  All other systems reviewed and are negative  Vitals  Vitals:    06/06/22 1015   BP: 122/74   BP Location: Left arm   Patient Position: Sitting   Cuff Size: Large   Pulse: 62   SpO2: 98%   Weight: 86 6 kg (191 lb)   Height: 5' 10" (1 778 m)       Physical Exam  Constitutional:       General: He is not in acute distress  Appearance: Normal appearance  He is not ill-appearing, toxic-appearing or diaphoretic  HENT:      Head: Normocephalic  Nose: No congestion  Eyes:      General: No scleral icterus  Right eye: No discharge  Left eye: No discharge  Conjunctiva/sclera: Conjunctivae normal       Pupils: Pupils are equal, round, and reactive to light  Pulmonary:      Effort: Pulmonary effort is normal    Genitourinary:     Comments: Defers BRITTANI  Musculoskeletal:      Cervical back: Normal range of motion  Skin:     General: Skin is warm and dry  Coloration: Skin is not jaundiced or pale  Findings: No bruising, erythema, lesion or rash  Neurological:      General: No focal deficit present  Mental Status: He is alert and oriented to person, place, and time  Mental status is at baseline  Gait: Gait normal    Psychiatric:         Mood and Affect: Mood normal          Behavior: Behavior normal          Thought Content:  Thought content normal          Judgment: Judgment normal            Past History  Past Medical History:   Diagnosis Date    Hypertension     Pneumonia      Social History     Socioeconomic History    Marital status: /Civil Union     Spouse name: None    Number of children: None    Years of education: None    Highest education level: None   Occupational History    None   Tobacco Use    Smoking status: Former Smoker Packs/day: 0 50     Years: 20 00     Pack years: 10 00     Types: E-Cigarettes, Cigarettes     Start date:      Quit date: 2020     Years since quittin 4    Smokeless tobacco: Never Used    Tobacco comment: Just started smoking again    Vaping Use    Vaping Use: Never used   Substance and Sexual Activity    Alcohol use: Yes     Comment: Social use    Drug use: Yes     Types: Marijuana     Comment: medical    Sexual activity: Yes   Other Topics Concern    None   Social History Narrative    None     Social Determinants of Health     Financial Resource Strain: Not on file   Food Insecurity: Not on file   Transportation Needs: Not on file   Physical Activity: Not on file   Stress: Not on file   Social Connections: Not on file   Intimate Partner Violence: Not on file   Housing Stability: Not on file     Social History     Tobacco Use   Smoking Status Former Smoker    Packs/day: 0 50    Years: 20 00    Pack years: 10 00    Types: E-Cigarettes, Cigarettes    Start date:     Quit date: 2020    Years since quittin 4   Smokeless Tobacco Never Used   Tobacco Comment    Just started smoking again      Family History   Adopted: Yes   Problem Relation Age of Onset    Coronary artery disease Father     Cancer Father     No Known Problems Mother     Heart disease Son         Heart murmer-       The following portions of the patient's history were reviewed and updated as appropriate: allergies, current medications, past medical history, past social history, past surgical history and problem list     Results  No results found for this or any previous visit (from the past 1 hour(s)) ]  Lab Results   Component Value Date    PSA 1 6 2022     Lab Results   Component Value Date    CALCIUM 9 4 2022    K 4 0 2022    CO2 30 2022     2022    BUN 23 2022    CREATININE 1 06 2022     Lab Results   Component Value Date    WBC 7 42 2021    HGB 17 7 (H) 08/31/2021    HCT 51 8 (H) 08/31/2021    MCV 94 08/31/2021     08/31/2021       Efren Martinez PA-C

## 2022-10-03 ENCOUNTER — OFFICE VISIT (OUTPATIENT)
Dept: INTERNAL MEDICINE CLINIC | Facility: CLINIC | Age: 47
End: 2022-10-03
Payer: COMMERCIAL

## 2022-10-03 VITALS
TEMPERATURE: 98.7 F | DIASTOLIC BLOOD PRESSURE: 88 MMHG | WEIGHT: 191.6 LBS | OXYGEN SATURATION: 97 % | SYSTOLIC BLOOD PRESSURE: 164 MMHG | BODY MASS INDEX: 27.43 KG/M2 | HEIGHT: 70 IN | HEART RATE: 66 BPM | RESPIRATION RATE: 15 BRPM

## 2022-10-03 DIAGNOSIS — F17.209 NICOTINE DEPENDENCE WITH NICOTINE-INDUCED DISORDER, UNSPECIFIED NICOTINE PRODUCT TYPE: ICD-10-CM

## 2022-10-03 DIAGNOSIS — R59.9 ENLARGED LYMPH NODES: ICD-10-CM

## 2022-10-03 DIAGNOSIS — E78.2 MIXED HYPERLIPIDEMIA: ICD-10-CM

## 2022-10-03 DIAGNOSIS — R73.01 IMPAIRED FASTING GLUCOSE: Primary | ICD-10-CM

## 2022-10-03 DIAGNOSIS — G47.33 OSA (OBSTRUCTIVE SLEEP APNEA): ICD-10-CM

## 2022-10-03 DIAGNOSIS — I10 BENIGN HYPERTENSION: ICD-10-CM

## 2022-10-03 DIAGNOSIS — N28.1 COMPLEX RENAL CYST: ICD-10-CM

## 2022-10-03 DIAGNOSIS — R91.1 LUNG NODULE SEEN ON IMAGING STUDY: ICD-10-CM

## 2022-10-03 DIAGNOSIS — I25.84 CORONARY ARTERY CALCIFICATION: ICD-10-CM

## 2022-10-03 DIAGNOSIS — R35.0 URINE FREQUENCY: ICD-10-CM

## 2022-10-03 DIAGNOSIS — I25.10 CORONARY ARTERY CALCIFICATION: ICD-10-CM

## 2022-10-03 PROCEDURE — 99215 OFFICE O/P EST HI 40 MIN: CPT | Performed by: INTERNAL MEDICINE

## 2022-10-03 RX ORDER — LISINOPRIL 20 MG/1
20 TABLET ORAL DAILY
Qty: 90 TABLET | Refills: 2 | Status: SHIPPED | OUTPATIENT
Start: 2022-10-03

## 2022-10-03 NOTE — PROGRESS NOTES
Assessment/Plan:    1  Impaired fasting glucose  -will get A1c as Pt has been complaining frequency and elevated sugar in basic metabolic profile    2  HTN  -Pt does not take any medication currently  -medication compliance was strongly encouraged  -start lisinopril 10 mg daily for 1 week and increase it to 20 mg daily   -will check BMP in 2 weeks    3  Lump in the neck  -no B symptoms, no other lymph node enlargement  -noticed 1 week ago  -no recent viral infection, prior TB, prior STD  -no unintentional sleep and appetite changes   -check high sensitivity CRP, ESR, LDH, QuantiFERON TB gold test  -follow-up in 2 weeks  -advised to continue monitoring enlargement  4  Coronary artery calcification noted on CT chest  -Pt does have extensive H/o smoking  -lipid panel last year was elevated with LDL of 120  -will repeat lipid panel, A1c  -had an extensive discussion with Pt regarding coronary calcium scoring which Pt agrees even if it is out of pocket  -will need statin depending on results  5  DEBORAH, under control with no sleep changes  6  Lung nodule noted on CT imaging  -noted MAICO nodule which is partially calcified along with surrounding calcified nodules which has been stable since 2021   -not suspected to have malignancy  -is already scheduled for repeat CT chest in 2023     7  Complex renal cyst  -following up with urologist  -recent CT AP noted to have benign cyst  -is scheduled for follow-up EUS kidney + bladder in 2023     8  Lower urinary tract symptoms  -complaining of frequency, poor stream, increasing abdominal pressure for urination  -had previously denied rectal exam in urology office  -does not want to take any medication  -for now was advised to try relaxation technique and monitor if it gets worse will order further investigations and medications if needed      9  H/o smoking  -did not smoke since November 2021   -does take lot of caffeine otherwise not willing to smoke anymore   -was congratulated on his effort to avoid smoking  Was strongly encouraged to continue avoiding it  Pt was explained in detail regarding above plan  Was required to follow-up in 2 weeks with all the lab results  Problem List Items Addressed This Visit        Endocrine    Impaired fasting glucose - Primary    Relevant Orders    HEMOGLOBIN A1C W/ EAG ESTIMATION    CBC and differential    Lipid panel       Respiratory    DEBORAH (obstructive sleep apnea)       Cardiovascular and Mediastinum    Benign hypertension    Relevant Medications    lisinopril (ZESTRIL) 20 mg tablet    Other Relevant Orders    CBC and differential    Lipid panel    CT coronary calcium score    Basic metabolic panel    Coronary artery calcification    Relevant Orders    CT coronary calcium score       Immune and Lymphatic    Enlarged lymph nodes    Relevant Orders    Quantiferon TB Gold Plus    Sedimentation rate, automated    LD,Blood    High sensitivity CRP       Genitourinary    Complex renal cyst       Other    Mixed hyperlipidemia    Relevant Orders    Lipid panel    Nicotine dependence with nicotine-induced disorder    Lung nodule seen on imaging study      Other Visit Diagnoses     Urine frequency                Subjective:      Patient ID: Peter Barber is a 50 y o  male  Pt is a 51 y/o M with PMH of HTN, impaired fasting glucose, smoking history presented for routine follow-up visit  Pt complaining of lump noted in the Lt side of the neck  No ear symptoms  No nasal congestion  Stating intentional weight loss however no abnormal weight loss  No appetite changes  No hearing issues  Has not smoked since last 1 year  No trouble swallowing  No recent viral infection  No prior H/o STDs  No prior tuberculosis  Also having urine frequency, poor stream, some difficulty emptying bladder is reviewed    Has been seeing by urologist and has been refusing rectal exam   CT AP last year did not show any are prostatomegaly or bladder distention  Seems under control  Does not want any medication head  Still drinks a lot of caffeine every day  Of is following up with Urology for renal complex cyst which seems to be stable  His lung nodule also has been stable over years  I sleep apnea under control  In sleep apnea is under control w/o edema he changes in sleep  The following portions of the patient's history were reviewed and updated as appropriate:       Review of Systems:    Review of Systems   Constitutional: Negative for activity change, appetite change, chills, diaphoresis, fatigue, fever and unexpected weight change  HENT: Negative for rhinorrhea and sore throat  Eyes: Negative for visual disturbance  Respiratory: Negative for cough, chest tightness and shortness of breath  Cardiovascular: Negative for chest pain, palpitations and leg swelling  Gastrointestinal: Negative for abdominal distention, abdominal pain, blood in stool, constipation, diarrhea, nausea and vomiting  Genitourinary: Positive for difficulty urinating and frequency  Negative for decreased urine volume, dysuria, flank pain, hematuria and urgency  Musculoskeletal: Negative for arthralgias  Neurological: Negative for headaches  Psychiatric/Behavioral: Negative for behavioral problems and sleep disturbance           Past Medical and Surgical History:     Past Medical History:   Diagnosis Date    Hypertension     Pneumonia        Past Surgical History:   Procedure Laterality Date    NO PAST SURGERIES           Family History:    Family History   Adopted: Yes   Problem Relation Age of Onset    No Known Problems Mother     Coronary artery disease Father     Cancer Father         bladder    Heart disease Son         Heart murmer-          Social History:    Substance Use History:   Social History     Substance and Sexual Activity   Alcohol Use Yes    Comment: Social use     Social History     Tobacco Use   Smoking Status Former Smoker    Packs/day: 0 50    Years: 20 00    Pack years: 10 00    Types: E-Cigarettes, Cigarettes    Start date:     Quit date: 2020    Years since quittin 7   Smokeless Tobacco Never Used   Tobacco Comment    Just started smoking again      Social History     Substance and Sexual Activity   Drug Use Yes    Types: Marijuana    Comment: medical         Meds/Allergies:    Prior to Admission medications    Medication Sig Start Date End Date Taking? Authorizing Provider   acetaminophen (TYLENOL) 325 mg tablet Take 650 mg by mouth every 6 (six) hours as needed for mild pain   Yes Historical Provider, MD   ibuprofen (MOTRIN) 200 mg tablet Take 200 mg by mouth every 6 (six) hours as needed for mild pain   Yes Historical Provider, MD   lisinopril (ZESTRIL) 40 mg tablet Take 1 tablet (40 mg total) by mouth daily  Patient not taking: Reported on 10/3/2022 8/1/21   Layla Melendez MD       Allergies: No Known Allergies    Objective:  Vitals:    10/03/22 1608   BP: 164/88   BP Location: Left arm   Patient Position: Sitting   Cuff Size: Standard   Pulse: 66   Resp: 15   Temp: 98 7 °F (37 1 °C)   TempSrc: Tympanic   SpO2: 97%   Weight: 86 9 kg (191 lb 9 6 oz)   Height: 5' 10" (1 778 m)     Body mass index is 27 49 kg/m²  Physical Exam  Vitals reviewed  Constitutional:       General: He is not in acute distress  Appearance: He is well-developed  HENT:      Head: Normocephalic and atraumatic  Right Ear: Ear canal and external ear normal  There is impacted cerumen  Left Ear: Tympanic membrane, ear canal and external ear normal  There is impacted cerumen  Nose: Nose normal  No congestion or rhinorrhea  Mouth/Throat:      Mouth: Mucous membranes are moist       Pharynx: Oropharynx is clear  No oropharyngeal exudate or posterior oropharyngeal erythema  Eyes:      General: No scleral icterus  Right eye: No discharge  Left eye: No discharge     Cardiovascular: Rate and Rhythm: Normal rate and regular rhythm  Pulses: Normal pulses  Heart sounds: Normal heart sounds  Pulmonary:      Effort: Pulmonary effort is normal  No respiratory distress  Breath sounds: Normal breath sounds  No wheezing or rales  Chest:      Chest wall: No tenderness  Abdominal:      General: Bowel sounds are normal  There is no distension  Palpations: Abdomen is soft  Tenderness: There is no abdominal tenderness  Musculoskeletal:         General: No swelling or tenderness  Normal range of motion  Cervical back: Normal range of motion  Right lower leg: No edema  Left lower leg: No edema  Skin:     General: Skin is warm  Coloration: Skin is not jaundiced or pale  Findings: No bruising or lesion  Neurological:      General: No focal deficit present  Mental Status: He is alert and oriented to person, place, and time  Mental status is at baseline  Cranial Nerves: No cranial nerve deficit  Sensory: No sensory deficit  Motor: No weakness  Psychiatric:         Mood and Affect: Mood normal          Behavior: Behavior normal        BMI Counseling: Body mass index is 27 49 kg/m²  The BMI is above normal  Nutrition recommendations include reducing portion sizes, decreasing overall calorie intake, 3-5 servings of fruits/vegetables daily, reducing fast food intake, consuming healthier snacks, decreasing soda and/or juice intake, moderation in carbohydrate intake and increasing intake of lean protein  Exercise recommendations include vigorous aerobic physical activity for 75 minutes/week

## 2022-10-05 ENCOUNTER — APPOINTMENT (OUTPATIENT)
Dept: LAB | Facility: CLINIC | Age: 47
End: 2022-10-05
Payer: COMMERCIAL

## 2022-10-05 DIAGNOSIS — I10 BENIGN HYPERTENSION: ICD-10-CM

## 2022-10-05 DIAGNOSIS — E78.2 MIXED HYPERLIPIDEMIA: ICD-10-CM

## 2022-10-05 DIAGNOSIS — R73.01 IMPAIRED FASTING GLUCOSE: ICD-10-CM

## 2022-10-05 DIAGNOSIS — R59.9 ENLARGED LYMPH NODES: ICD-10-CM

## 2022-10-05 LAB
ANION GAP SERPL CALCULATED.3IONS-SCNC: 4 MMOL/L (ref 4–13)
BASOPHILS # BLD AUTO: 0.03 THOUSANDS/ΜL (ref 0–0.1)
BASOPHILS NFR BLD AUTO: 1 % (ref 0–1)
BUN SERPL-MCNC: 18 MG/DL (ref 5–25)
CALCIUM SERPL-MCNC: 9.4 MG/DL (ref 8.3–10.1)
CHLORIDE SERPL-SCNC: 107 MMOL/L (ref 96–108)
CHOLEST SERPL-MCNC: 171 MG/DL
CO2 SERPL-SCNC: 26 MMOL/L (ref 21–32)
CREAT SERPL-MCNC: 1.06 MG/DL (ref 0.6–1.3)
CRP SERPL HS-MCNC: 3.94 MG/L
EOSINOPHIL # BLD AUTO: 0.22 THOUSAND/ΜL (ref 0–0.61)
EOSINOPHIL NFR BLD AUTO: 4 % (ref 0–6)
ERYTHROCYTE [DISTWIDTH] IN BLOOD BY AUTOMATED COUNT: 12.2 % (ref 11.6–15.1)
ERYTHROCYTE [SEDIMENTATION RATE] IN BLOOD: 10 MM/HOUR (ref 0–14)
EST. AVERAGE GLUCOSE BLD GHB EST-MCNC: 105 MG/DL
GFR SERPL CREATININE-BSD FRML MDRD: 82 ML/MIN/1.73SQ M
GLUCOSE P FAST SERPL-MCNC: 103 MG/DL (ref 65–99)
HBA1C MFR BLD: 5.3 %
HCT VFR BLD AUTO: 47.8 % (ref 36.5–49.3)
HDLC SERPL-MCNC: 37 MG/DL
HGB BLD-MCNC: 16.2 G/DL (ref 12–17)
IMM GRANULOCYTES # BLD AUTO: 0.02 THOUSAND/UL (ref 0–0.2)
IMM GRANULOCYTES NFR BLD AUTO: 0 % (ref 0–2)
LDH SERPL-CCNC: 187 U/L (ref 81–234)
LDLC SERPL CALC-MCNC: 107 MG/DL (ref 0–100)
LYMPHOCYTES # BLD AUTO: 1.68 THOUSANDS/ΜL (ref 0.6–4.47)
LYMPHOCYTES NFR BLD AUTO: 28 % (ref 14–44)
MCH RBC QN AUTO: 31.1 PG (ref 26.8–34.3)
MCHC RBC AUTO-ENTMCNC: 33.9 G/DL (ref 31.4–37.4)
MCV RBC AUTO: 92 FL (ref 82–98)
MONOCYTES # BLD AUTO: 0.45 THOUSAND/ΜL (ref 0.17–1.22)
MONOCYTES NFR BLD AUTO: 8 % (ref 4–12)
NEUTROPHILS # BLD AUTO: 3.63 THOUSANDS/ΜL (ref 1.85–7.62)
NEUTS SEG NFR BLD AUTO: 59 % (ref 43–75)
NONHDLC SERPL-MCNC: 134 MG/DL
NRBC BLD AUTO-RTO: 0 /100 WBCS
PLATELET # BLD AUTO: 185 THOUSANDS/UL (ref 149–390)
PMV BLD AUTO: 10.9 FL (ref 8.9–12.7)
POTASSIUM SERPL-SCNC: 4.4 MMOL/L (ref 3.5–5.3)
RBC # BLD AUTO: 5.21 MILLION/UL (ref 3.88–5.62)
SODIUM SERPL-SCNC: 137 MMOL/L (ref 135–147)
TRIGL SERPL-MCNC: 135 MG/DL
WBC # BLD AUTO: 6.03 THOUSAND/UL (ref 4.31–10.16)

## 2022-10-05 PROCEDURE — 83036 HEMOGLOBIN GLYCOSYLATED A1C: CPT

## 2022-10-05 PROCEDURE — 85652 RBC SED RATE AUTOMATED: CPT

## 2022-10-05 PROCEDURE — 85025 COMPLETE CBC W/AUTO DIFF WBC: CPT

## 2022-10-05 PROCEDURE — 86480 TB TEST CELL IMMUN MEASURE: CPT

## 2022-10-05 PROCEDURE — 80048 BASIC METABOLIC PNL TOTAL CA: CPT

## 2022-10-05 PROCEDURE — 80061 LIPID PANEL: CPT

## 2022-10-05 PROCEDURE — 86141 C-REACTIVE PROTEIN HS: CPT

## 2022-10-05 PROCEDURE — 36415 COLL VENOUS BLD VENIPUNCTURE: CPT

## 2022-10-05 PROCEDURE — 83615 LACTATE (LD) (LDH) ENZYME: CPT

## 2022-10-06 DIAGNOSIS — R59.9 ENLARGED LYMPH NODES: Primary | ICD-10-CM

## 2022-10-06 LAB
GAMMA INTERFERON BACKGROUND BLD IA-ACNC: 0.04 IU/ML
M TB IFN-G BLD-IMP: NEGATIVE
M TB IFN-G CD4+ BCKGRND COR BLD-ACNC: -0.01 IU/ML
M TB IFN-G CD4+ BCKGRND COR BLD-ACNC: 0 IU/ML
MITOGEN IGNF BCKGRD COR BLD-ACNC: >10 IU/ML

## 2022-10-06 RX ORDER — AMOXICILLIN AND CLAVULANATE POTASSIUM 875; 125 MG/1; MG/1
1 TABLET, FILM COATED ORAL EVERY 12 HOURS SCHEDULED
Qty: 20 TABLET | Refills: 0 | Status: SHIPPED | OUTPATIENT
Start: 2022-10-06 | End: 2022-10-16

## 2022-10-07 ENCOUNTER — HOSPITAL ENCOUNTER (EMERGENCY)
Facility: HOSPITAL | Age: 47
Discharge: HOME | End: 2022-10-07
Attending: EMERGENCY MEDICINE
Payer: COMMERCIAL

## 2022-10-07 ENCOUNTER — TELEPHONE (OUTPATIENT)
Dept: INTERNAL MEDICINE CLINIC | Facility: CLINIC | Age: 47
End: 2022-10-07

## 2022-10-07 ENCOUNTER — HOSPITAL ENCOUNTER (OUTPATIENT)
Dept: CT IMAGING | Facility: HOSPITAL | Age: 47
End: 2022-10-07
Payer: COMMERCIAL

## 2022-10-07 VITALS
TEMPERATURE: 98.1 F | SYSTOLIC BLOOD PRESSURE: 204 MMHG | OXYGEN SATURATION: 96 % | RESPIRATION RATE: 18 BRPM | DIASTOLIC BLOOD PRESSURE: 91 MMHG | HEART RATE: 80 BPM

## 2022-10-07 DIAGNOSIS — R59.9 ENLARGED LYMPH NODES: ICD-10-CM

## 2022-10-07 DIAGNOSIS — K11.20 PAROTITIS: ICD-10-CM

## 2022-10-07 DIAGNOSIS — L03.211 FACIAL CELLULITIS: Primary | ICD-10-CM

## 2022-10-07 PROCEDURE — 99284 EMERGENCY DEPT VISIT MOD MDM: CPT | Performed by: EMERGENCY MEDICINE

## 2022-10-07 PROCEDURE — 70491 CT SOFT TISSUE NECK W/DYE: CPT

## 2022-10-07 PROCEDURE — 99283 EMERGENCY DEPT VISIT LOW MDM: CPT

## 2022-10-07 PROCEDURE — G1004 CDSM NDSC: HCPCS

## 2022-10-07 RX ORDER — CLINDAMYCIN HYDROCHLORIDE 150 MG/1
450 CAPSULE ORAL ONCE
Status: COMPLETED | OUTPATIENT
Start: 2022-10-07 | End: 2022-10-07

## 2022-10-07 RX ORDER — CLINDAMYCIN HYDROCHLORIDE 150 MG/1
450 CAPSULE ORAL 3 TIMES DAILY
Qty: 90 CAPSULE | Refills: 0 | Status: SHIPPED | OUTPATIENT
Start: 2022-10-07 | End: 2022-10-17

## 2022-10-07 RX ADMIN — IOHEXOL 90 ML: 350 INJECTION, SOLUTION INTRAVENOUS at 12:32

## 2022-10-07 RX ADMIN — CLINDAMYCIN HYDROCHLORIDE 450 MG: 150 CAPSULE ORAL at 14:55

## 2022-10-07 NOTE — TELEPHONE ENCOUNTER
----- Message from Promise Aleman, Lucia Tran sent at 10/7/2022  4:38 PM EDT -----  His CT suggests inflammation to the parotid gland with infection to the skin around it   He should start the abx that venus prescribed and follow up next week

## 2022-10-07 NOTE — ED PROVIDER NOTES
History  Chief Complaint   Patient presents with    Facial Swelling     Patient c/o left sided facial swelling for approx 5 days  Patient was sent by PCP for CT of head around two hours ago and sent to ED for evaluation  49 y/o male presents to the ED for left facial swelling x 5 days  Patient states that he has had L sided facial swelling and pain that has been gradually worsening since  Denies any fever, n/v, sore throat, trismus, voice change, cp, sob, abd pain, or n/v  States that he saw his PCP who ordered an outpatient CT scan which showed cellulitis and parotitis  He states that he came here because he was unsure if anything needed to be drained  Has not tried any medications for the symptoms  No other complaints  History provided by:  Patient  Evaluation of Abnormal Diagnostic Test  Time since result:  Few hours   Resulting agency:  [facility]  Result type: radiology    Radiology:     Other abnormal imaging result:  Ct soft tissue neck with contrast       Prior to Admission Medications   Prescriptions Last Dose Informant Patient Reported?  Taking?   acetaminophen (TYLENOL) 325 mg tablet  Self Yes No   Sig: Take 650 mg by mouth every 6 (six) hours as needed for mild pain   amoxicillin-clavulanate (Augmentin) 875-125 mg per tablet   No No   Sig: Take 1 tablet by mouth every 12 (twelve) hours for 10 days   ibuprofen (MOTRIN) 200 mg tablet  Self Yes No   Sig: Take 200 mg by mouth every 6 (six) hours as needed for mild pain   lisinopril (ZESTRIL) 20 mg tablet   No No   Sig: Take 1 tablet (20 mg total) by mouth daily Take 10 mg for 1 week and increase it to 20 mg daily      Facility-Administered Medications: None       Past Medical History:   Diagnosis Date    Hypertension     Pneumonia        Past Surgical History:   Procedure Laterality Date    JOINT REPLACEMENT      hip right    NO PAST SURGERIES         Family History   Adopted: Yes   Problem Relation Age of Onset    No Known Problems Mother    [facility] Coronary artery disease Father     Cancer Father         bladder    Heart disease Son         Heart murmer-     I have reviewed and agree with the history as documented  E-Cigarette/Vaping    E-Cigarette Use Never User      E-Cigarette/Vaping Substances    Nicotine No     THC No     CBD No     Flavoring No     Other No     Unknown No      Social History     Tobacco Use    Smoking status: Former Smoker     Packs/day: 0 50     Years: 20 00     Pack years: 10 00     Types: E-Cigarettes, Cigarettes     Start date:      Quit date: 2020     Years since quittin 7    Smokeless tobacco: Never Used    Tobacco comment: Just started smoking again    Vaping Use    Vaping Use: Never used   Substance Use Topics    Alcohol use: Yes     Comment: Social use    Drug use: Yes     Types: Marijuana     Comment: medical       Review of Systems   Constitutional: Negative for chills and fever  HENT: Positive for facial swelling  Negative for congestion, ear pain and sore throat  Eyes: Negative for pain and visual disturbance  Respiratory: Negative for cough, shortness of breath and wheezing  Cardiovascular: Negative for chest pain and leg swelling  Gastrointestinal: Negative for abdominal pain, diarrhea, nausea and vomiting  Genitourinary: Negative for dysuria, frequency, hematuria and urgency  Musculoskeletal: Negative for neck pain and neck stiffness  Skin: Negative for rash and wound  Neurological: Negative for weakness, numbness and headaches  Psychiatric/Behavioral: Negative for agitation and confusion  All other systems reviewed and are negative  Physical Exam  Physical Exam  Vitals and nursing note reviewed  Constitutional:       Appearance: He is well-developed  HENT:      Head: Normocephalic and atraumatic  Comments: Left sided facial swelling  No flutuance  No signs of ludwigs  No trismus or posterior pharynx abnormaliaty  No drooling     Eyes:      Pupils: Pupils are equal, round, and reactive to light  Cardiovascular:      Rate and Rhythm: Normal rate and regular rhythm  Pulmonary:      Effort: Pulmonary effort is normal       Breath sounds: Normal breath sounds  Abdominal:      General: Bowel sounds are normal       Palpations: Abdomen is soft  Musculoskeletal:         General: Normal range of motion  Cervical back: Normal range of motion and neck supple  Skin:     General: Skin is warm and dry  Neurological:      General: No focal deficit present  Mental Status: He is alert and oriented to person, place, and time  Comments: No focal deficits         Vital Signs  ED Triage Vitals [10/07/22 1401]   Temperature Pulse Respirations Blood Pressure SpO2   98 1 °F (36 7 °C) 80 18 (!) 204/91 96 %      Temp src Heart Rate Source Patient Position - Orthostatic VS BP Location FiO2 (%)   -- -- -- -- --      Pain Score       5           Vitals:    10/07/22 1401   BP: (!) 204/91   Pulse: 80         Visual Acuity      ED Medications  Medications   clindamycin (CLEOCIN) capsule 450 mg (450 mg Oral Given 10/7/22 1455)       Diagnostic Studies  Results Reviewed     None                 No orders to display              Procedures  Procedures         ED Course                               SBIRT 22yo+    Flowsheet Row Most Recent Value   SBIRT (23 yo +)    In order to provide better care to our patients, we are screening all of our patients for alcohol and drug use  Would it be okay to ask you these screening questions? No Filed at: 10/07/2022 1458                    MDM  Number of Diagnoses or Management Options  Facial cellulitis: new and requires workup  Parotitis: new and requires workup  Diagnosis management comments: Patient with facial swelling- CT scan shows Left  parotitis and left upper neck cellulitis  No sialolithiasis  No abscess or adenopathy  Offered labs but patient does not want them done at this time  Will give abx and d/c home       Patient reevaluated and feels improved  Discharge instructions given including medications, follow-up, and return precautions  Patient demonstrates verbal understanding and agrees with plan           Amount and/or Complexity of Data Reviewed  Clinical lab tests: ordered and reviewed  Tests in the radiology section of CPT®: ordered and reviewed  Tests in the medicine section of CPT®: ordered and reviewed  Discussion of test results with the performing providers: yes  Decide to obtain previous medical records or to obtain history from someone other than the patient: yes  Obtain history from someone other than the patient: yes  Review and summarize past medical records: yes  Discuss the patient with other providers: yes  Independent visualization of images, tracings, or specimens: yes    Patient Progress  Patient progress: improved      Disposition  Final diagnoses:   Facial cellulitis   Parotitis     Time reflects when diagnosis was documented in both MDM as applicable and the Disposition within this note     Time User Action Codes Description Comment    10/7/2022  2:50 PM Vicki Woodward [F35 [id number]] Facial cellulitis     10/7/2022  2:51 PM Vicki Johnson Add [K11 20] Parotitis       ED Disposition     ED Disposition   Discharge    Condition   Stable    Date/Time   Fri Oct 7, 2022  2:50 PM    Comment   Riccardo Perdomo discharge to home/self care                 Follow-up Information     Follow up With Specialties Details Why Contact Info Additional Information    Carole Dennis MD Internal Medicine, Hospice Services, Palliative Care Call in 1 day for follow up within 2-3 days [address] [place] [address], [place] [address] Emergency Department Emergency Medicine Go to  immediately for any new or worsening symptoms [address] [facility] Emergency Department, [address] [place] [address] Donal Micah [place], [place], [address]          Discharge Medication List as of 10/7/2022  2:53 PM      START taking these medications    Details   clindamycin (CLEOCIN) 150 mg capsule Take 3 capsules (450 mg total) by mouth 3 (three) times a day for 10 days, Starting Fri 10/7/2022, Until Mon 10/17/2022, Print         CONTINUE these medications which have NOT CHANGED    Details   amoxicillin-clavulanate (Augmentin) 875-125 mg per tablet Take 1 tablet by mouth every 12 (twelve) hours for 10 days, Starting u 10/6/2022, Until Sun 10/16/2022, Normal      acetaminophen (TYLENOL) 325 mg tablet Take 650 mg by mouth every 6 (six) hours as needed for mild pain, Historical Med      ibuprofen (MOTRIN) 200 mg tablet Take 200 mg by mouth every 6 (six) hours as needed for mild pain, Historical Med      lisinopril (ZESTRIL) 20 mg tablet Take 1 tablet (20 mg total) by mouth daily Take 10 mg for 1 week and increase it to 20 mg daily, Starting Mon 10/3/2022, Normal             No discharge procedures on file      PDMP Review     None          ED Provider  Electronically Signed by           Bret Araiza DO  10/07/22 3437

## 2022-10-21 ENCOUNTER — OFFICE VISIT (OUTPATIENT)
Dept: INTERNAL MEDICINE CLINIC | Facility: CLINIC | Age: 47
End: 2022-10-21
Payer: COMMERCIAL

## 2022-10-21 VITALS
HEIGHT: 70 IN | HEART RATE: 78 BPM | SYSTOLIC BLOOD PRESSURE: 166 MMHG | BODY MASS INDEX: 26.63 KG/M2 | DIASTOLIC BLOOD PRESSURE: 96 MMHG | RESPIRATION RATE: 16 BRPM | WEIGHT: 186 LBS

## 2022-10-21 DIAGNOSIS — F17.209 NICOTINE DEPENDENCE WITH NICOTINE-INDUCED DISORDER, UNSPECIFIED NICOTINE PRODUCT TYPE: ICD-10-CM

## 2022-10-21 DIAGNOSIS — R73.01 IMPAIRED FASTING GLUCOSE: Primary | ICD-10-CM

## 2022-10-21 DIAGNOSIS — Z00.00 PHYSICAL EXAM: ICD-10-CM

## 2022-10-21 DIAGNOSIS — R91.1 PULMONARY NODULE 1 CM OR GREATER IN DIAMETER: ICD-10-CM

## 2022-10-21 DIAGNOSIS — E78.2 MIXED HYPERLIPIDEMIA: ICD-10-CM

## 2022-10-21 DIAGNOSIS — G47.33 OSA (OBSTRUCTIVE SLEEP APNEA): ICD-10-CM

## 2022-10-21 DIAGNOSIS — R35.0 URINARY FREQUENCY: ICD-10-CM

## 2022-10-21 PROCEDURE — 99214 OFFICE O/P EST MOD 30 MIN: CPT | Performed by: NURSE PRACTITIONER

## 2022-10-21 NOTE — PROGRESS NOTES
INTERNAL MEDICINE FOLLOW-UP VISIT  [facility] [address] [place] [address]    NAME: Chris Overall  AGE: 50 y o  SEX: male  : 1975     DATE: 10/21/2022     Assessment and Plan:   1  Impaired fasting glucose  - Hemoglobin A1C; Future    2  DEBORAH (obstructive sleep apnea)    3  Nicotine dependence with nicotine-induced disorder, unspecified nicotine product type  Cessation encouraged    4  Mixed hyperlipidemia  - CBC and differential; Future  - Comprehensive metabolic panel; Future  - Lipid panel; Future  - TSH, 3rd generation with Free T4 reflex; Future  Coronary calcium test is pending family hx of early heart disease  5  Pulmonary nodule 1 cm or greater in diameter  Scheduled for follow up    6  Urinary frequency  psa in may was normal  Consider adding alpha blocker for bp and bph control  Avoid nighttime drinking and bladder irritating foods and drinks  - UA w Reflex to Microscopic w Reflex to Culture  - Chlamydia/GC amplified DNA by PCR; Future    7  Hypertension  Admits has not been compliant w/ medication  Take daily and contact me in 1 week w/ bp      No follow-ups on file  Chief Complaint:     Chief Complaint   Patient presents with   • Follow-up     2 weeks  History of Present Illness:     Here to follow labs and scans   Nocturia/pressure   Urinary freq and dribbling on and off for 1 year  Getting over a parotid infection  Schedule calcium test      The following portions of the patient's history were reviewed and updated as appropriate: allergies, current medications, past family history, past medical history, past social history, past surgical history and problem list      Review of Systems:     Review of Systems   Constitutional: Negative for appetite change, chills, diaphoresis, fatigue, fever and unexpected weight change  HENT: Negative for postnasal drip and sneezing  Eyes: Negative for visual disturbance     Respiratory: Negative for chest tightness and shortness of breath  Cardiovascular: Negative for chest pain, palpitations and leg swelling  Gastrointestinal: Negative for abdominal pain and blood in stool  Endocrine: Negative for cold intolerance, heat intolerance, polydipsia, polyphagia and polyuria  Genitourinary: Negative for difficulty urinating, dysuria, frequency and urgency  Musculoskeletal: Negative for arthralgias and myalgias  Skin: Negative for rash and wound  Neurological: Negative for dizziness, weakness, light-headedness and headaches  Hematological: Negative for adenopathy  Psychiatric/Behavioral: Negative for confusion, dysphoric mood and sleep disturbance  The patient is not nervous/anxious  Past Medical History:     Past Medical History:   Diagnosis Date   • Hypertension    • Pneumonia         Current Medications:     Current Outpatient Medications:   •  acetaminophen (TYLENOL) 325 mg tablet, Take 650 mg by mouth every 6 (six) hours as needed for mild pain, Disp: , Rfl:   •  ibuprofen (MOTRIN) 200 mg tablet, Take 200 mg by mouth every 6 (six) hours as needed for mild pain, Disp: , Rfl:   •  lisinopril (ZESTRIL) 20 mg tablet, Take 1 tablet (20 mg total) by mouth daily Take 10 mg for 1 week and increase it to 20 mg daily, Disp: 90 tablet, Rfl: 2     Allergies:   No Known Allergies     Physical Exam:     /96 (BP Location: Left arm, Patient Position: Sitting, Cuff Size: Standard)   Pulse 78   Resp 16   Ht 5' 10" (1 778 m)   Wt 84 4 kg (186 lb)   BMI 26 69 kg/m²     Physical Exam  Constitutional:       Appearance: He is well-developed  HENT:      Head: Normocephalic and atraumatic  Eyes:      Conjunctiva/sclera: Conjunctivae normal       Pupils: Pupils are equal, round, and reactive to light  Cardiovascular:      Rate and Rhythm: Normal rate and regular rhythm  Heart sounds: Normal heart sounds  Pulmonary:      Effort: Pulmonary effort is normal       Breath sounds: Normal breath sounds  Abdominal:      General: Bowel sounds are normal       Palpations: Abdomen is soft  Musculoskeletal:         General: Normal range of motion  Cervical back: Normal range of motion  Skin:     General: Skin is warm and dry  Neurological:      Mental Status: He is alert and oriented to person, place, and time  Data:     Laboratory Results: I have personally reviewed the pertinent laboratory results/reports   Radiology/Other Diagnostic Testing Results: I have personally reviewed pertinent reports         Saba Roy

## 2022-11-05 ENCOUNTER — APPOINTMENT (OUTPATIENT)
Dept: LAB | Facility: HOSPITAL | Age: 47
End: 2022-11-05

## 2022-11-05 ENCOUNTER — HOSPITAL ENCOUNTER (OUTPATIENT)
Dept: CT IMAGING | Facility: HOSPITAL | Age: 47
Discharge: HOME | End: 2022-11-05

## 2022-11-05 DIAGNOSIS — I10 BENIGN HYPERTENSION: ICD-10-CM

## 2022-11-05 DIAGNOSIS — R35.0 URINARY FREQUENCY: ICD-10-CM

## 2022-11-05 DIAGNOSIS — I25.84 CORONARY ARTERY CALCIFICATION: ICD-10-CM

## 2022-11-05 DIAGNOSIS — I25.10 CORONARY ARTERY CALCIFICATION: ICD-10-CM

## 2022-11-05 LAB
BACTERIA UR QL AUTO: ABNORMAL /HPF
BILIRUB UR QL STRIP: NEGATIVE
CLARITY UR: CLEAR
COLOR UR: YELLOW
GLUCOSE UR STRIP-MCNC: NEGATIVE MG/DL
HGB UR QL STRIP.AUTO: NEGATIVE
KETONES UR STRIP-MCNC: NEGATIVE MG/DL
LEUKOCYTE ESTERASE UR QL STRIP: NEGATIVE
MUCOUS THREADS UR QL AUTO: ABNORMAL
NITRITE UR QL STRIP: NEGATIVE
NON-SQ EPI CELLS URNS QL MICRO: ABNORMAL /HPF
PH UR STRIP.AUTO: 6 [PH]
PROT UR STRIP-MCNC: ABNORMAL MG/DL
RBC #/AREA URNS AUTO: ABNORMAL /HPF
SP GR UR STRIP.AUTO: 1.02 (ref 1–1.03)
UROBILINOGEN UR QL STRIP.AUTO: 0.2 E.U./DL
WBC #/AREA URNS AUTO: ABNORMAL /HPF

## 2022-11-07 LAB
C TRACH DNA SPEC QL NAA+PROBE: NEGATIVE
N GONORRHOEA DNA SPEC QL NAA+PROBE: NEGATIVE

## 2022-11-08 ENCOUNTER — TELEPHONE (OUTPATIENT)
Dept: INTERNAL MEDICINE CLINIC | Facility: CLINIC | Age: 47
End: 2022-11-08

## 2022-11-11 ENCOUNTER — TELEPHONE (OUTPATIENT)
Dept: INTERNAL MEDICINE CLINIC | Facility: CLINIC | Age: 47
End: 2022-11-11

## 2022-11-11 NOTE — TELEPHONE ENCOUNTER
----- Message from Chloe Ramirez MD sent at 11/11/2022  6:48 AM EST -----  Coronary calcium score is 142, he should start on statin    Please arrange f/u to discuss, or I can call in if he doesn't feel need for discussion

## 2022-11-16 NOTE — TELEPHONE ENCOUNTER
Left detailed message on cell phone   He needs to call us and let us know if he wants to start statin

## 2023-02-03 ENCOUNTER — TELEPHONE (OUTPATIENT)
Dept: UROLOGY | Facility: AMBULATORY SURGERY CENTER | Age: 48
End: 2023-02-03

## 2023-02-03 DIAGNOSIS — R39.9 LOWER URINARY TRACT SYMPTOMS (LUTS): Primary | ICD-10-CM

## 2023-02-03 NOTE — TELEPHONE ENCOUNTER
Called and spoke to patient  Pricila esparza PA-C message below  Patient will stop azo and go for urine testing 24 hours after  Patient thankful for call and verbalized understanding

## 2023-02-03 NOTE — TELEPHONE ENCOUNTER
Shouldn't take AZO more than 3 days in a row  If getting urine testing, would need to be off AZO for at least 24 hours   Can place orders to r/o UTI

## 2023-02-03 NOTE — TELEPHONE ENCOUNTER
Pt called and stated he is currently taking AZO for possible UTI pt is having bladder/uretha pain and sometimes trouble urinating and frequency  Pt is asking if he should continue taking AZO til his appt on 2/6/23 due to it changing his urine very dark or if he should stop taking it   Pt stating it is not giving him much relief    Pt call back-[phone]

## 2023-02-03 NOTE — PROGRESS NOTES
2023      Chief Complaint   Patient presents with   • Follow-up     Assessment and Plan    1  LUTS  - Urine dip today negative for blood leukocytes, or nitrites  - PVR=11 mL  - BRITTANI benign  PSA from 5/3/22 was 1 6  - Will trial Flomax  -Recommend increased hydration, avoiding bladder irritants, and avoid constipation   - Follow up in 6 weeks for symptom reassessment  History of Present Illness  Bernie Stanton is a 50 y o  male here for follow up evaluation of new issue of lower urinary tract symptoms  He endorses an episode of significant difficulty with urinating and only dribbling  He states this resolved but he continues to have sensation of incomplete emptying, suprapubic pressure and discomfort at times, and urinary hesitancy  He denies any hematuria, abdominal pain, or flank pain  Denies any perineal pain  Patient was previously seen in 2022 for a complex renal cyst which was identified to be a Bosniak 2 lesion stable since 2018  He admits to family history of bladder cancer in his father  Review of Systems   Constitutional: Negative for chills and fever  Respiratory: Negative for shortness of breath  Cardiovascular: Negative for chest pain  Gastrointestinal: Negative for abdominal pain  Genitourinary: Positive for difficulty urinating and frequency  Negative for dysuria, flank pain, hematuria and urgency  Neurological: Negative for dizziness                    Past Medical History  Past Medical History:   Diagnosis Date   • Hypertension    • Pneumonia        Past Social History  Past Surgical History:   Procedure Laterality Date   • JOINT REPLACEMENT      hip right   • NO PAST SURGERIES       Social History     Tobacco Use   Smoking Status Former   • Packs/day: 0 50   • Years: 20 00   • Pack years: 10 00   • Types: E-Cigarettes, Cigarettes   • Start date: [address]   • Quit date: 2020   • Years since quittin 1   Smokeless Tobacco Never   Tobacco Comments    Just started smoking again        Past Family History  Family History   Adopted: Yes   Problem Relation Age of Onset   • No Known Problems Mother    • Coronary artery disease Father    • Cancer Father         bladder   • Heart disease Son         Heart murmer-       Past Social history  Social History     Socioeconomic History   • Marital status: Legally      Spouse name: Not on file   • Number of children: Not on file   • Years of education: Not on file   • Highest education level: Not on file   Occupational History   • Not on file   Tobacco Use   • Smoking status: Former     Packs/day: 0 50     Years: 20 00     Pack years: 10 00     Types: E-Cigarettes, Cigarettes     Start date:      Quit date: 2020     Years since quittin 1   • Smokeless tobacco: Never   • Tobacco comments:     Just started smoking again    Vaping Use   • Vaping Use: Never used   Substance and Sexual Activity   • Alcohol use: Yes     Comment: Social use   • Drug use: Yes     Types: Marijuana     Comment: medical   • Sexual activity: Yes   Other Topics Concern   • Not on file   Social History Narrative   • Not on file     Social Determinants of Health     Financial Resource Strain: Not on file   Food Insecurity: Not on file   Transportation Needs: Not on file   Physical Activity: Sufficiently Active   • Days of Exercise per Week: 5 days   • Minutes of Exercise per Session: 60 min   Stress: Not on file   Social Connections: Not on file   Intimate Partner Violence: Not on file   Housing Stability: Not on file       Current Medications  Current Outpatient Medications   Medication Sig Dispense Refill   • acetaminophen (TYLENOL) 325 mg tablet Take 650 mg by mouth every 6 (six) hours as needed for mild pain     • ibuprofen (MOTRIN) 200 mg tablet Take 200 mg by mouth every 6 (six) hours as needed for mild pain     • lisinopril (ZESTRIL) 20 mg tablet Take 1 tablet (20 mg total) by mouth daily Take 10 mg for 1 week and increase it to 20 mg daily 90 tablet 2   • multivitamin (THERAGRAN) TABS Take 1 tablet by mouth daily       No current facility-administered medications for this visit  Allergies  No Known Allergies      The following portions of the patient's history were reviewed and updated as appropriate: allergies, current medications, past medical history, past social history, past surgical history and problem list       Vitals  Vitals:    02/06/23 0807   BP: (!) 190/116   Pulse: 75   SpO2: 100%   Weight: 81 6 kg (180 lb)   Height: 5' 10" (1 778 m)           Physical Exam  Physical Exam  Constitutional:       Appearance: Normal appearance  HENT:      Head: Normocephalic and atraumatic  Right Ear: External ear normal       Left Ear: External ear normal       Nose: Nose normal    Eyes:      General: No scleral icterus  Conjunctiva/sclera: Conjunctivae normal    Cardiovascular:      Pulses: Normal pulses  Pulmonary:      Effort: Pulmonary effort is normal    Genitourinary:     Comments: Prostate without nodules or tenderness  Musculoskeletal:         General: Normal range of motion  Cervical back: Normal range of motion  Skin:     General: Skin is warm and dry  Neurological:      General: No focal deficit present  Mental Status: He is alert and oriented to person, place, and time  Psychiatric:         Mood and Affect: Mood normal          Behavior: Behavior normal          Thought Content:  Thought content normal          Judgment: Judgment normal            Results  Recent Results (from the past 1 hour(s))   POCT urine dip    Collection Time: 02/06/23  8:10 AM   Result Value Ref Range    LEUKOCYTE ESTERASE,UA -     NITRITE,UA -     SL AMB POCT UROBILINOGEN 0 2     POCT URINE PROTEIN ++      PH,UA 5 0     BLOOD,UA -     SPECIFIC GRAVITY,UA 1 025     KETONES,UA -     BILIRUBIN,UA -     GLUCOSE, UA -      COLOR,UA Yellow     CLARITY,UA Clear    POCT Measure PVR    Collection Time: 02/06/23  8:16 AM   Result Value Ref Range    POST-VOID RESIDUAL VOLUME, ML POC 11 mL   ]  Lab Results   Component Value Date    PSA 1 6 05/03/2022     Lab Results   Component Value Date    CALCIUM 9 4 10/05/2022    K 4 4 10/05/2022    CO2 26 10/05/2022     10/05/2022    BUN 18 10/05/2022    CREATININE 1 06 10/05/2022     Lab Results   Component Value Date    WBC 6 03 10/05/2022    HGB 16 2 10/05/2022    HCT 47 8 10/05/2022    MCV 92 10/05/2022     10/05/2022           Orders  Orders Placed This Encounter   Procedures   • POCT Measure PVR   • POCT urine dip       Franky Coleman

## 2023-02-06 ENCOUNTER — OFFICE VISIT (OUTPATIENT)
Dept: UROLOGY | Facility: CLINIC | Age: 48
End: 2023-02-06

## 2023-02-06 VITALS
SYSTOLIC BLOOD PRESSURE: 190 MMHG | DIASTOLIC BLOOD PRESSURE: 116 MMHG | HEIGHT: 70 IN | WEIGHT: 180 LBS | OXYGEN SATURATION: 100 % | HEART RATE: 75 BPM | BODY MASS INDEX: 25.77 KG/M2

## 2023-02-06 DIAGNOSIS — R39.9 LOWER URINARY TRACT SYMPTOMS (LUTS): Primary | ICD-10-CM

## 2023-02-06 LAB

## 2023-02-06 RX ORDER — TAMSULOSIN HYDROCHLORIDE 0.4 MG/1
0.4 CAPSULE ORAL
Qty: 30 CAPSULE | Refills: 2 | Status: SHIPPED | OUTPATIENT
Start: 2023-02-06

## 2023-02-06 RX ORDER — DIPHENOXYLATE HYDROCHLORIDE AND ATROPINE SULFATE 2.5; .025 MG/1; MG/1
1 TABLET ORAL DAILY
COMMUNITY

## 2023-02-10 ENCOUNTER — HOSPITAL ENCOUNTER (OUTPATIENT)
Dept: CT IMAGING | Facility: HOSPITAL | Age: 48
Discharge: HOME | End: 2023-02-10

## 2023-02-10 DIAGNOSIS — R91.1 PULMONARY NODULE 1 CM OR GREATER IN DIAMETER: ICD-10-CM

## 2023-02-20 ENCOUNTER — TELEPHONE (OUTPATIENT)
Dept: INTERNAL MEDICINE CLINIC | Facility: CLINIC | Age: 48
End: 2023-02-20

## 2023-02-20 DIAGNOSIS — R91.1 PULMONARY NODULE 1 CM OR GREATER IN DIAMETER: Primary | ICD-10-CM

## 2023-02-20 DIAGNOSIS — F17.208 NICOTINE DEPENDENCE WITH OTHER NICOTINE-INDUCED DISORDER, UNSPECIFIED NICOTINE PRODUCT TYPE: ICD-10-CM

## 2023-02-20 NOTE — TELEPHONE ENCOUNTER
----- Message from JULIO Larsen sent at 2/20/2023 12:53 PM EST -----  Lung nodule stable   We will consider low dose CT chest annually after he turns 48

## 2023-03-17 NOTE — PROGRESS NOTES
3/22/2023      Chief Complaint   Patient presents with   • Follow-up       Assessment and Plan    1  LUTS  - Improved with Flomax  - Discussed work-up with cystoscopy and transrectal ultrasound if ongoing symptoms which he declines at this time  - He wishes to follow up in 6 months for reassessment  History of Present Illness  Ruth Patrick is a 50 y o  male here for follow up evaluation of lower urinary tract symptoms  He complained of difficulty urinating, sensation of incomplete emptying, and urinary hesitancy at last visit  He was started on Flomax  Recent PSA from 5/2022 was 1 6  Prostate exam was normal at last visit  He reports overall good improvement with the Flomax  Urinated prior to visit, unable to do uroflow, bladder scan-95 ML    AUA SYMPTOM SCORE    Flowsheet Row Most Recent Value   AUA SYMPTOM SCORE    How often have you had a sensation of not emptying your bladder completely after you finished urinating? 3 (P)     How often have you had to urinate again less than two hours after you finished urinating? 3 (P)     How often have you found you stopped and started again several times when you urinate? 4 (P)     How often have you found it difficult to postpone urination? 2 (P)     How often have you had a weak urinary stream? 4 (P)     How often have you had to push or strain to begin urination? 2 (P)     How many times did you most typically get up to urinate from the time you went to bed at night until the time you got up in the morning? 1 (P)     Quality of Life: If you were to spend the rest of your life with your urinary condition just the way it is now, how would you feel about that? 3 (P)     AUA SYMPTOM SCORE 19 (P)           Review of Systems   Constitutional: Negative for chills and fever  Respiratory: Negative for shortness of breath  Cardiovascular: Negative for chest pain  Gastrointestinal: Negative for abdominal pain     Genitourinary: Negative for difficulty urinating, dysuria, flank pain, frequency, hematuria and urgency  Neurological: Negative for dizziness                    Past Medical History  Past Medical History:   Diagnosis Date   • Hypertension    • Pneumonia        Past Social History  Past Surgical History:   Procedure Laterality Date   • JOINT REPLACEMENT      hip right   • NO PAST SURGERIES       Social History     Tobacco Use   Smoking Status Former   • Packs/day: 0 50   • Years: 20 00   • Pack years: 10 00   • Types: E-Cigarettes, Cigarettes   • Start date:    • Quit date: 2020   • Years since quittin 2   • Passive exposure: Past   Smokeless Tobacco Never   Tobacco Comments    Just started smoking again        Past Family History  Family History   Adopted: Yes   Problem Relation Age of Onset   • No Known Problems Mother    • Coronary artery disease Father    • Cancer Father         bladder   • Heart disease Son         Heart murmer-       Past Social history  Social History     Socioeconomic History   • Marital status: Legally      Spouse name: Not on file   • Number of children: Not on file   • Years of education: Not on file   • Highest education level: Not on file   Occupational History   • Not on file   Tobacco Use   • Smoking status: Former     Packs/day: 0 50     Years: 20 00     Pack years: 10 00     Types: E-Cigarettes, Cigarettes     Start date:      Quit date: 2020     Years since quittin 2     Passive exposure: Past   • Smokeless tobacco: Never   • Tobacco comments:     Just started smoking again    Vaping Use   • Vaping Use: Never used   Substance and Sexual Activity   • Alcohol use: Yes     Comment: Social use   • Drug use: Yes     Types: Marijuana     Comment: medical   • Sexual activity: Yes   Other Topics Concern   • Not on file   Social History Narrative   • Not on file     Social Determinants of Health     Financial Resource Strain: Not on file   Food Insecurity: Not on file   Transportation Needs: Not on file   Physical Activity: Sufficiently Active   • Days of Exercise per Week: 5 days   • Minutes of Exercise per Session: 60 min   Stress: Not on file   Social Connections: Not on file   Intimate Partner Violence: Not on file   Housing Stability: Not on file       Current Medications  Current Outpatient Medications   Medication Sig Dispense Refill   • acetaminophen (TYLENOL) 325 mg tablet Take 650 mg by mouth every 6 (six) hours as needed for mild pain     • ibuprofen (MOTRIN) 200 mg tablet Take 200 mg by mouth every 6 (six) hours as needed for mild pain     • lisinopril (ZESTRIL) 20 mg tablet Take 1 tablet (20 mg total) by mouth daily Take 10 mg for 1 week and increase it to 20 mg daily 90 tablet 2   • multivitamin (THERAGRAN) TABS Take 1 tablet by mouth daily     • tamsulosin (FLOMAX) 0 4 mg Take 1 capsule (0 4 mg total) by mouth daily with dinner 30 capsule 2     No current facility-administered medications for this visit  Allergies  No Known Allergies      The following portions of the patient's history were reviewed and updated as appropriate: allergies, current medications, past medical history, past social history, past surgical history and problem list       Vitals  Vitals:    03/22/23 0807   BP: 128/82   Pulse: 65   Weight: 79 8 kg (176 lb)   Height: 5' 10" (1 778 m)           Physical Exam  Physical Exam  Constitutional:       Appearance: Normal appearance  HENT:      Head: Normocephalic and atraumatic  Right Ear: External ear normal       Left Ear: External ear normal       Nose: Nose normal    Eyes:      General: No scleral icterus  Conjunctiva/sclera: Conjunctivae normal    Cardiovascular:      Pulses: Normal pulses  Pulmonary:      Effort: Pulmonary effort is normal    Musculoskeletal:         General: Normal range of motion  Cervical back: Normal range of motion  Neurological:      General: No focal deficit present        Mental Status: He is alert and oriented to person, place, and time  Psychiatric:         Mood and Affect: Mood normal          Behavior: Behavior normal          Thought Content:  Thought content normal          Judgment: Judgment normal            Results  Recent Results (from the past 1 hour(s))   POCT Measure PVR    Collection Time: 03/22/23  8:09 AM   Result Value Ref Range    POST-VOID RESIDUAL VOLUME, ML POC 95 mL   ]  Lab Results   Component Value Date    PSA 1 6 05/03/2022     Lab Results   Component Value Date    CALCIUM 9 4 10/05/2022    K 4 4 10/05/2022    CO2 26 10/05/2022     10/05/2022    BUN 18 10/05/2022    CREATININE 1 06 10/05/2022     Lab Results   Component Value Date    WBC 6 03 10/05/2022    HGB 16 2 10/05/2022    HCT 47 8 10/05/2022    MCV 92 10/05/2022     10/05/2022           Orders  Orders Placed This Encounter   Procedures   • POCT Measure PVR       Grady Mata

## 2023-03-22 ENCOUNTER — OFFICE VISIT (OUTPATIENT)
Dept: UROLOGY | Facility: CLINIC | Age: 48
End: 2023-03-22

## 2023-03-22 VITALS
BODY MASS INDEX: 25.2 KG/M2 | WEIGHT: 176 LBS | DIASTOLIC BLOOD PRESSURE: 82 MMHG | SYSTOLIC BLOOD PRESSURE: 128 MMHG | HEIGHT: 70 IN | HEART RATE: 65 BPM

## 2023-03-22 DIAGNOSIS — R39.9 LOWER URINARY TRACT SYMPTOMS (LUTS): Primary | ICD-10-CM

## 2023-03-22 LAB — POST-VOID RESIDUAL VOLUME, ML POC: 95 ML

## 2023-03-22 RX ORDER — TAMSULOSIN HYDROCHLORIDE 0.4 MG/1
0.4 CAPSULE ORAL
Qty: 90 CAPSULE | Refills: 1 | Status: SHIPPED | OUTPATIENT
Start: 2023-03-22

## 2023-03-31 ENCOUNTER — TELEPHONE (OUTPATIENT)
Dept: OTHER | Facility: OTHER | Age: 48
End: 2023-03-31

## 2023-03-31 ENCOUNTER — HOSPITAL ENCOUNTER (EMERGENCY)
Facility: HOSPITAL | Age: 48
Discharge: HOME | End: 2023-03-31
Attending: EMERGENCY MEDICINE

## 2023-03-31 VITALS
SYSTOLIC BLOOD PRESSURE: 193 MMHG | OXYGEN SATURATION: 100 % | HEART RATE: 103 BPM | TEMPERATURE: 97.8 F | RESPIRATION RATE: 20 BRPM | DIASTOLIC BLOOD PRESSURE: 97 MMHG

## 2023-03-31 DIAGNOSIS — R33.9 URINARY RETENTION: Primary | ICD-10-CM

## 2023-03-31 LAB
BACTERIA UR QL AUTO: NORMAL /HPF
BILIRUB UR QL STRIP: NEGATIVE
CLARITY UR: CLEAR
COLOR UR: COLORLESS
GLUCOSE UR STRIP-MCNC: NEGATIVE MG/DL
HGB UR QL STRIP.AUTO: NEGATIVE
KETONES UR STRIP-MCNC: ABNORMAL MG/DL
LEUKOCYTE ESTERASE UR QL STRIP: NEGATIVE
NITRITE UR QL STRIP: NEGATIVE
NON-SQ EPI CELLS URNS QL MICRO: NORMAL /HPF
PH UR STRIP.AUTO: 5.5 [PH]
PROT UR STRIP-MCNC: ABNORMAL MG/DL
RBC #/AREA URNS AUTO: NORMAL /HPF
SP GR UR STRIP.AUTO: 1.01 (ref 1–1.03)
UROBILINOGEN UR STRIP-ACNC: <2 MG/DL
WBC #/AREA URNS AUTO: NORMAL /HPF

## 2023-03-31 RX ORDER — LIDOCAINE HYDROCHLORIDE 20 MG/ML
1 JELLY TOPICAL ONCE
Status: COMPLETED | OUTPATIENT
Start: 2023-03-31 | End: 2023-03-31

## 2023-03-31 RX ADMIN — LIDOCAINE HYDROCHLORIDE 1 APPLICATION.: 20 JELLY TOPICAL at 06:34

## 2023-03-31 NOTE — ED PROVIDER NOTES
"History  Chief Complaint   Patient presents with   • Urinary Retention     Pt arrived ambulatory with c/o \"unable to pee since 1 am\" hx enlarged prostate     43-year-old male coming in today with reports of urinary retention  Unable to void since 10:30 PM last evening  Reports history of enlarged prostate  Denies fever  Denies dysuria  Has urinary frequency and weak stream at times          Prior to Admission Medications   Prescriptions Last Dose Informant Patient Reported? Taking?   acetaminophen (TYLENOL) 325 mg tablet   Yes No   Sig: Take 650 mg by mouth every 6 (six) hours as needed for mild pain   ibuprofen (MOTRIN) 200 mg tablet   Yes No   Sig: Take 200 mg by mouth every 6 (six) hours as needed for mild pain   lisinopril (ZESTRIL) 20 mg tablet   No No   Sig: Take 1 tablet (20 mg total) by mouth daily Take 10 mg for 1 week and increase it to 20 mg daily   multivitamin (THERAGRAN) TABS   Yes No   Sig: Take 1 tablet by mouth daily   tamsulosin (FLOMAX) 0 4 mg   No No   Sig: Take 1 capsule (0 4 mg total) by mouth daily with dinner      Facility-Administered Medications: None       Past Medical History:   Diagnosis Date   • Hypertension    • Pneumonia        Past Surgical History:   Procedure Laterality Date   • JOINT REPLACEMENT      hip right   • NO PAST SURGERIES         Family History   Adopted: Yes   Problem Relation Age of Onset   • No Known Problems Mother    • Coronary artery disease Father    • Cancer Father         bladder   • Heart disease Son         Heart murmer-     I have reviewed and agree with the history as documented      E-Cigarette/Vaping   • E-Cigarette Use Never User      E-Cigarette/Vaping Substances   • Nicotine No    • THC No    • CBD No    • Flavoring No    • Other No    • Unknown No      Social History     Tobacco Use   • Smoking status: Former     Packs/day: 0 50     Years: 20 00     Pack years: 10 00     Types: E-Cigarettes, Cigarettes     Start date: 1999     Quit date: 12/20/2020 " Years since quittin 2     Passive exposure: Past   • Smokeless tobacco: Never   • Tobacco comments:     Just started smoking again    Vaping Use   • Vaping Use: Never used   Substance Use Topics   • Alcohol use: Not Currently     Comment: Social use   • Drug use: Yes     Types: Marijuana     Comment: medical       Review of Systems   Constitutional: Negative for chills and fever  HENT: Negative for ear pain and sore throat  Eyes: Negative for pain and visual disturbance  Respiratory: Negative for cough and shortness of breath  Cardiovascular: Negative for chest pain and palpitations  Gastrointestinal: Positive for abdominal pain  Negative for vomiting  Genitourinary: Positive for decreased urine volume  Negative for dysuria and hematuria  Musculoskeletal: Negative for arthralgias and back pain  Skin: Negative for color change and rash  Neurological: Negative for seizures and syncope  All other systems reviewed and are negative  Physical Exam  Physical Exam  Vitals and nursing note reviewed  Constitutional:       General: He is in acute distress  Appearance: He is well-developed  HENT:      Head: Normocephalic and atraumatic  Eyes:      General:         Right eye: No discharge  Left eye: No discharge  Conjunctiva/sclera: Conjunctivae normal    Cardiovascular:      Rate and Rhythm: Normal rate  Pulmonary:      Effort: Pulmonary effort is normal  No respiratory distress  Abdominal:      General: There is no distension  Tenderness: There is no guarding  Musculoskeletal:         General: No deformity  Cervical back: Normal range of motion and neck supple  Skin:     General: Skin is warm and dry  Neurological:      Mental Status: He is alert and oriented to person, place, and time        Coordination: Coordination normal          Vital Signs  ED Triage Vitals [23 0625]   Temperature Pulse Respirations Blood Pressure SpO2   97 8 °F (36 6 °C) (!) 111 22 (!) 201/100 100 %      Temp Source Heart Rate Source Patient Position - Orthostatic VS BP Location FiO2 (%)   Oral Monitor Sitting Right arm --      Pain Score       --           Vitals:    03/31/23 0625 03/31/23 0630   BP: (!) 201/100 (!) 193/97   Pulse: (!) 111 103   Patient Position - Orthostatic VS: Sitting Sitting         Visual Acuity      ED Medications  Medications   morphine injection 2 mg (2 mg Intramuscular Not Given 3/31/23 0641)   lidocaine (URO-JET) 2 % urethral/mucosal gel 1 application  (1 application  Urethral Given 3/31/23 7832)       Diagnostic Studies  Results Reviewed     Procedure Component Value Units Date/Time    UA w Reflex to Microscopic w Reflex to Culture [[id number]]     Lab Status: No result Specimen: Urine                  No orders to display              Procedures  Procedures         ED Course                               SBIRT 20yo+    Flowsheet Row Most Recent Value   SBIRT (23 yo +)    In order to provide better care to our patients, we are screening all of our patients for alcohol and drug use  Would it be okay to ask you these screening questions? Yes Filed at: 03/31/2023 2776   Initial Alcohol Screen: US AUDIT-C     1  How often do you have a drink containing alcohol? 0 Filed at: 03/31/2023 0642   2  How many drinks containing alcohol do you have on a typical day you are drinking? 0 Filed at: 03/31/2023 0642   3a  Male UNDER 65: How often do you have five or more drinks on one occasion? 0 Filed at: 03/31/2023 0642   3b  FEMALE Any Age, or MALE 65+: How often do you have 4 or more drinks on one occassion? 0 Filed at: 03/31/2023 8277   Audit-C Score 0 Filed at: 03/31/2023 7197   LEVY: How many times in the past year have you    Used an illegal drug or used a prescription medication for non-medical reasons?  Never Filed at: 03/31/2023 9323                    Medical Decision Making  Urinary retention: acute illness or injury  Risk  Prescription drug management  Disposition  Final diagnoses:   Urinary retention     Time reflects when diagnosis was documented in both MDM as applicable and the Disposition within this note     Time User Action Codes Description Comment    3/31/2023  6:48 AM Malu Zuniga Add [R33 9] Urinary retention       ED Disposition     ED Disposition   Discharge    Condition   Stable    Date/Time   Fri Mar 31, 2023  6:48 AM    Comment   Wilbert Chowdary discharge to home/self care  Follow-up Information     Follow up With Specialties Details Why Contact Info Additional [address] For [facility] Urology Schedule an appointment as soon as possible for a visit  For Continued Evaluation [address]  [place] [address] [facility],[address] [address]  701  [facility], [address], [place] [address], [facility], [place], [address] [phone]          Patient's Medications   Discharge Prescriptions    No medications on file       No discharge procedures on file      PDMP Review     None          ED Provider  Electronically Signed by           Lucia Jeffrey  03/31/23 9701

## 2023-03-31 NOTE — TELEPHONE ENCOUNTER
Called to confirm appt  with patient  Patient advised to increase water intake and use acetaminophen for pain

## 2023-03-31 NOTE — TELEPHONE ENCOUNTER
Patient following up on scheduling red removal     Patient had red placed today at the ER due to urinary retention      Patient requesting a call back to schedule at [phone]

## 2023-03-31 NOTE — TELEPHONE ENCOUNTER
Patient of Roma Baker was discharged from the [facility] today with urinary retention and had a catheter placed  He is requesting an appointment to follow up    Please call to schedule

## 2023-05-12 ENCOUNTER — APPOINTMENT (OUTPATIENT)
Dept: LAB | Facility: HOSPITAL | Age: 48
End: 2023-05-12

## 2023-05-12 ENCOUNTER — OFFICE VISIT (OUTPATIENT)
Dept: UROLOGY | Facility: CLINIC | Age: 48
End: 2023-05-12

## 2023-05-12 ENCOUNTER — OFFICE VISIT (OUTPATIENT)
Dept: LAB | Facility: HOSPITAL | Age: 48
End: 2023-05-12

## 2023-05-12 ENCOUNTER — LAB REQUISITION (OUTPATIENT)
Dept: LAB | Facility: HOSPITAL | Age: 48
End: 2023-05-12

## 2023-05-12 VITALS
HEIGHT: 70 IN | WEIGHT: 175 LBS | OXYGEN SATURATION: 99 % | DIASTOLIC BLOOD PRESSURE: 94 MMHG | BODY MASS INDEX: 25.05 KG/M2 | SYSTOLIC BLOOD PRESSURE: 162 MMHG | HEART RATE: 75 BPM

## 2023-05-12 DIAGNOSIS — Z01.818 ENCOUNTER FOR OTHER PREPROCEDURAL EXAMINATION: ICD-10-CM

## 2023-05-12 DIAGNOSIS — R39.9 LOWER URINARY TRACT SYMPTOMS (LUTS): ICD-10-CM

## 2023-05-12 DIAGNOSIS — Z12.5 SCREENING FOR PROSTATE CANCER: ICD-10-CM

## 2023-05-12 DIAGNOSIS — N40.0 ENLARGED PROSTATE: ICD-10-CM

## 2023-05-12 DIAGNOSIS — Z01.818 PRE-OP TESTING: ICD-10-CM

## 2023-05-12 DIAGNOSIS — R39.89 SUSPECTED UTI: ICD-10-CM

## 2023-05-12 DIAGNOSIS — Z01.810 PREOP CARDIOVASCULAR EXAM: ICD-10-CM

## 2023-05-12 DIAGNOSIS — N40.0 ENLARGED PROSTATE: Primary | ICD-10-CM

## 2023-05-12 DIAGNOSIS — Z01.812 PRE-PROCEDURE LAB EXAM: ICD-10-CM

## 2023-05-12 LAB
ANION GAP SERPL CALCULATED.3IONS-SCNC: 4 MMOL/L (ref 4–13)
ATRIAL RATE: 45 BPM
BACTERIA UR QL AUTO: ABNORMAL /HPF
BASOPHILS # BLD AUTO: 0.03 THOUSANDS/ÂΜL (ref 0–0.1)
BASOPHILS NFR BLD AUTO: 1 % (ref 0–1)
BILIRUB UR QL STRIP: NEGATIVE
BUN SERPL-MCNC: 23 MG/DL (ref 5–25)
CALCIUM SERPL-MCNC: 10 MG/DL (ref 8.4–10.2)
CHLORIDE SERPL-SCNC: 105 MMOL/L (ref 96–108)
CLARITY UR: CLEAR
CO2 SERPL-SCNC: 29 MMOL/L (ref 21–32)
COLOR UR: ABNORMAL
CREAT SERPL-MCNC: 0.99 MG/DL (ref 0.6–1.3)
EOSINOPHIL # BLD AUTO: 0.18 THOUSAND/ÂΜL (ref 0–0.61)
EOSINOPHIL NFR BLD AUTO: 4 % (ref 0–6)
ERYTHROCYTE [DISTWIDTH] IN BLOOD BY AUTOMATED COUNT: 11.9 % (ref 11.6–15.1)
GFR SERPL CREATININE-BSD FRML MDRD: 89 ML/MIN/1.73SQ M
GLUCOSE P FAST SERPL-MCNC: 102 MG/DL (ref 65–99)
GLUCOSE UR STRIP-MCNC: NEGATIVE MG/DL
HCT VFR BLD AUTO: 44.1 % (ref 36.5–49.3)
HGB BLD-MCNC: 15.3 G/DL (ref 12–17)
HGB UR QL STRIP.AUTO: NEGATIVE
IMM GRANULOCYTES # BLD AUTO: 0.01 THOUSAND/UL (ref 0–0.2)
IMM GRANULOCYTES NFR BLD AUTO: 0 % (ref 0–2)
KETONES UR STRIP-MCNC: NEGATIVE MG/DL
LEUKOCYTE ESTERASE UR QL STRIP: ABNORMAL
LYMPHOCYTES # BLD AUTO: 1.73 THOUSANDS/ÂΜL (ref 0.6–4.47)
LYMPHOCYTES NFR BLD AUTO: 34 % (ref 14–44)
MCH RBC QN AUTO: 31.8 PG (ref 26.8–34.3)
MCHC RBC AUTO-ENTMCNC: 34.7 G/DL (ref 31.4–37.4)
MCV RBC AUTO: 92 FL (ref 82–98)
MONOCYTES # BLD AUTO: 0.47 THOUSAND/ÂΜL (ref 0.17–1.22)
MONOCYTES NFR BLD AUTO: 9 % (ref 4–12)
NEUTROPHILS # BLD AUTO: 2.71 THOUSANDS/ÂΜL (ref 1.85–7.62)
NEUTS SEG NFR BLD AUTO: 52 % (ref 43–75)
NITRITE UR QL STRIP: NEGATIVE
NON-SQ EPI CELLS URNS QL MICRO: ABNORMAL /HPF
NRBC BLD AUTO-RTO: 0 /100 WBCS
P AXIS: 56 DEGREES
PH UR STRIP.AUTO: 5 [PH]
PLATELET # BLD AUTO: 187 THOUSANDS/UL (ref 149–390)
PMV BLD AUTO: 10.1 FL (ref 8.9–12.7)
POST-VOID RESIDUAL VOLUME, ML POC: 83 ML
POTASSIUM SERPL-SCNC: 4.5 MMOL/L (ref 3.5–5.3)
PR INTERVAL: 154 MS
PROT UR STRIP-MCNC: NEGATIVE MG/DL
PSA SERPL-MCNC: 2.2 NG/ML (ref 0–4)
QRS AXIS: -6 DEGREES
QRSD INTERVAL: 144 MS
QT INTERVAL: 492 MS
QTC INTERVAL: 425 MS
RBC # BLD AUTO: 4.81 MILLION/UL (ref 3.88–5.62)
RBC #/AREA URNS AUTO: ABNORMAL /HPF
SODIUM SERPL-SCNC: 138 MMOL/L (ref 135–147)
SP GR UR STRIP.AUTO: 1.02 (ref 1–1.03)
T WAVE AXIS: 63 DEGREES
UROBILINOGEN UR STRIP-ACNC: <2 MG/DL
VENTRICULAR RATE: 45 BPM
WBC # BLD AUTO: 5.13 THOUSAND/UL (ref 4.31–10.16)
WBC #/AREA URNS AUTO: ABNORMAL /HPF

## 2023-05-12 NOTE — PROGRESS NOTES
Pre-op visit  5/12/2023      Chief Complaint   Patient presents with   • Follow-up     PT stated Urine sample was given at the lab         Assessment and Plan     50 y o  male managed by our office    1  Benign prostatic hyperplasia    History and physical was performed for the patients upcoming transurethral resection of prostate scheduled for 5/26/2023 with Dr Emile Hoskins  All questions and concerns regarding surgery have been addressed and answered  Will proceed with surgery as planned  History of Present Illness  Mayte Lezama is a 50 y o  male here for history and physical prior to their upcoming transurethral resection of prostate for benign prostatic hyperplasia with lower urinary tract symptoms schedule 5/26/2023  The patient has had no overall changes in their health since their last visit and denies any prior complications with anesthesia  Patient reports intermittent snoring  He denies use/abuse of tobacco   He reports use of medical marijuana  He reports rare intake of alcoholic beverages  Patient is a 67-year-old male with a history of benign prostatic hyperplasia with lower urinary tract symptoms including difficulty urinating, sensation of incomplete bladder emptying with urination, urinary hesitancy  He was previously trialed on Flomax with mild to moderate resolution of lower urinary tract symptoms  Cystoscopy performed 4/13/2023 with findings of lateral lobe coaptation of the prostate with significant intravesicular protrusion  No discrete median lobe was noted  He was managed with Parnell catheter secondary to urinary retention and subsequently learned how to perform CIC  He presents to the office today reporting no longer the need to perform CIC and is measuring his urine every time he urinates  He reports sensation of complete bladder emptying with urination at this time  He denies presence of urinary tract infections        Review of Systems   Constitutional: Negative for chills and fever  Respiratory: Negative for cough and shortness of breath  Cardiovascular: Negative for chest pain  Gastrointestinal: Negative for abdominal distention, abdominal pain, blood in stool, nausea and vomiting  Genitourinary: Positive for difficulty urinating, frequency and urgency  Negative for dysuria, enuresis, flank pain and hematuria  Skin: Negative for rash           Past Medical History  Past Medical History:   Diagnosis Date   • Hypertension    • Pneumonia        Past Social History  Past Surgical History:   Procedure Laterality Date   • JOINT REPLACEMENT      hip right   • NO PAST SURGERIES         Past Family History  Family History   Adopted: Yes   Problem Relation Age of Onset   • Coronary artery disease Father    • Cancer Father         Bladder cancer   • No Known Problems Mother    • Heart disease Son         Heart murmer-       Past Social history  Social History     Socioeconomic History   • Marital status: Legally      Spouse name: Not on file   • Number of children: Not on file   • Years of education: Not on file   • Highest education level: Not on file   Occupational History   • Not on file   Tobacco Use   • Smoking status: Former     Packs/day: 1 00     Years: 20 00     Pack years: 20 00     Types: Cigarettes     Start date: 34     Quit date: 10/1/2019     Years since quitting: 3 6     Passive exposure: Past   • Smokeless tobacco: Never   • Tobacco comments:     Just started smoking again    Vaping Use   • Vaping Use: Never used   Substance and Sexual Activity   • Alcohol use: Not Currently     Comment: Social use   • Drug use: Yes     Types: Marijuana     Comment: Medical marijuana   • Sexual activity: Yes     Partners: Female     Birth control/protection: Condom Male   Other Topics Concern   • Not on file   Social History Narrative   • Not on file     Social Determinants of Health     Financial Resource Strain: Not on file   Food Insecurity: Not on file "  Transportation Needs: Not on file   Physical Activity: Sufficiently Active   • Days of Exercise per Week: 5 days   • Minutes of Exercise per Session: 60 min   Stress: Not on file   Social Connections: Not on file   Intimate Partner Violence: Not on file   Housing Stability: Not on file       Current Medications  Current Outpatient Medications   Medication Sig Dispense Refill   • acetaminophen (TYLENOL) 325 mg tablet Take 650 mg by mouth every 6 (six) hours as needed for mild pain     • ibuprofen (MOTRIN) 200 mg tablet Take 200 mg by mouth every 6 (six) hours as needed for mild pain     • lisinopril (ZESTRIL) 20 mg tablet Take 1 tablet (20 mg total) by mouth daily Take 10 mg for 1 week and increase it to 20 mg daily (Patient taking differently: Take 20 mg by mouth daily) 90 tablet 2   • multivitamin (THERAGRAN) TABS Take 1 tablet by mouth daily     • tamsulosin (FLOMAX) 0 4 mg Take 1 capsule (0 4 mg total) by mouth daily with dinner 90 capsule 1     No current facility-administered medications for this visit  Allergies  No Known Allergies      Past Medical History, Social History, Family History, medications and allergies were reviewed        Vitals  Vitals:    05/12/23 1332   BP: 162/94   Pulse: 75   SpO2: 99%   Weight: 79 4 kg (175 lb)   Height: 5' 10\" (1 778 m)         Physical Exam    /94   Pulse 75   Ht 5' 10\" (1 778 m)   Wt 79 4 kg (175 lb)   SpO2 99%   BMI 25 11 kg/m²   General appearance: alert and oriented, in no acute distress  Head: Normocephalic, without obvious abnormality, atraumatic  Lungs: clear to auscultation bilaterally  Heart: regular rate and rhythm  Abdomen: Soft, nontender  Extremities: Full range of motion, no edema noted  Skin: Warm, dry and intact  Neurologic: Grossly normal    JULIO Biswas  "

## 2023-05-12 NOTE — H&P (VIEW-ONLY)
Pre-op visit  5/12/2023      Chief Complaint   Patient presents with   • Follow-up     PT stated Urine sample was given at the lab         Assessment and Plan     50 y o  male managed by our office    1  Benign prostatic hyperplasia    History and physical was performed for the patients upcoming transurethral resection of prostate scheduled for 5/26/2023 with Dr Karla Gupta  All questions and concerns regarding surgery have been addressed and answered  Will proceed with surgery as planned  History of Present Illness  Black Lindquist is a 50 y o  male here for history and physical prior to their upcoming transurethral resection of prostate for benign prostatic hyperplasia with lower urinary tract symptoms schedule 5/26/2023  The patient has had no overall changes in their health since their last visit and denies any prior complications with anesthesia  Patient reports intermittent snoring  He denies use/abuse of tobacco   He reports use of medical marijuana  He reports rare intake of alcoholic beverages  Patient is a 68-year-old male with a history of benign prostatic hyperplasia with lower urinary tract symptoms including difficulty urinating, sensation of incomplete bladder emptying with urination, urinary hesitancy  He was previously trialed on Flomax with mild to moderate resolution of lower urinary tract symptoms  Cystoscopy performed 4/13/2023 with findings of lateral lobe coaptation of the prostate with significant intravesicular protrusion  No discrete median lobe was noted  He was managed with Parnell catheter secondary to urinary retention and subsequently learned how to perform CIC  He presents to the office today reporting no longer the need to perform CIC and is measuring his urine every time he urinates  He reports sensation of complete bladder emptying with urination at this time  He denies presence of urinary tract infections        Review of Systems   Constitutional: Negative for chills and fever  Respiratory: Negative for cough and shortness of breath  Cardiovascular: Negative for chest pain  Gastrointestinal: Negative for abdominal distention, abdominal pain, blood in stool, nausea and vomiting  Genitourinary: Positive for difficulty urinating, frequency and urgency  Negative for dysuria, enuresis, flank pain and hematuria  Skin: Negative for rash           Past Medical History  Past Medical History:   Diagnosis Date   • Hypertension    • Pneumonia        Past Social History  Past Surgical History:   Procedure Laterality Date   • JOINT REPLACEMENT      hip right   • NO PAST SURGERIES         Past Family History  Family History   Adopted: Yes   Problem Relation Age of Onset   • Coronary artery disease Father    • Cancer Father         Bladder cancer   • No Known Problems Mother    • Heart disease Son         Heart murmer-       Past Social history  Social History     Socioeconomic History   • Marital status: Legally      Spouse name: Not on file   • Number of children: Not on file   • Years of education: Not on file   • Highest education level: Not on file   Occupational History   • Not on file   Tobacco Use   • Smoking status: Former     Packs/day: 1 00     Years: 20 00     Pack years: 20 00     Types: Cigarettes     Start date: 34     Quit date: 10/1/2019     Years since quitting: 3 6     Passive exposure: Past   • Smokeless tobacco: Never   • Tobacco comments:     Just started smoking again    Vaping Use   • Vaping Use: Never used   Substance and Sexual Activity   • Alcohol use: Not Currently     Comment: Social use   • Drug use: Yes     Types: Marijuana     Comment: Medical marijuana   • Sexual activity: Yes     Partners: Female     Birth control/protection: Condom Male   Other Topics Concern   • Not on file   Social History Narrative   • Not on file     Social Determinants of Health     Financial Resource Strain: Not on file   Food Insecurity: Not on file "  Transportation Needs: Not on file   Physical Activity: Sufficiently Active   • Days of Exercise per Week: 5 days   • Minutes of Exercise per Session: 60 min   Stress: Not on file   Social Connections: Not on file   Intimate Partner Violence: Not on file   Housing Stability: Not on file       Current Medications  Current Outpatient Medications   Medication Sig Dispense Refill   • acetaminophen (TYLENOL) 325 mg tablet Take 650 mg by mouth every 6 (six) hours as needed for mild pain     • ibuprofen (MOTRIN) 200 mg tablet Take 200 mg by mouth every 6 (six) hours as needed for mild pain     • lisinopril (ZESTRIL) 20 mg tablet Take 1 tablet (20 mg total) by mouth daily Take 10 mg for 1 week and increase it to 20 mg daily (Patient taking differently: Take 20 mg by mouth daily) 90 tablet 2   • multivitamin (THERAGRAN) TABS Take 1 tablet by mouth daily     • tamsulosin (FLOMAX) 0 4 mg Take 1 capsule (0 4 mg total) by mouth daily with dinner 90 capsule 1     No current facility-administered medications for this visit  Allergies  No Known Allergies      Past Medical History, Social History, Family History, medications and allergies were reviewed        Vitals  Vitals:    05/12/23 1332   BP: 162/94   Pulse: 75   SpO2: 99%   Weight: 79 4 kg (175 lb)   Height: 5' 10\" (1 778 m)         Physical Exam    /94   Pulse 75   Ht 5' 10\" (1 778 m)   Wt 79 4 kg (175 lb)   SpO2 99%   BMI 25 11 kg/m²   General appearance: alert and oriented, in no acute distress  Head: Normocephalic, without obvious abnormality, atraumatic  Lungs: clear to auscultation bilaterally  Heart: regular rate and rhythm  Abdomen: Soft, nontender  Extremities: Full range of motion, no edema noted  Skin: Warm, dry and intact  Neurologic: Grossly normal    JULIO Sampson  "

## 2023-05-13 LAB — BACTERIA UR CULT: NORMAL

## 2023-05-15 ENCOUNTER — TELEPHONE (OUTPATIENT)
Dept: UROLOGY | Facility: CLINIC | Age: 48
End: 2023-05-15

## 2023-05-19 LAB
ABO GROUP BLD: NORMAL
BLD GP AB SCN SERPL QL: NEGATIVE
RH BLD: NEGATIVE
SPECIMEN EXPIRATION DATE: NORMAL

## 2023-05-19 NOTE — PRE-PROCEDURE INSTRUCTIONS
Pre-Surgery Instructions:   Medication Instructions   • acetaminophen (TYLENOL) 325 mg tablet Uses PRN- OK to take day of surgery   • ibuprofen (MOTRIN) 200 mg tablet Stop taking 7 days prior to surgery  • lisinopril (ZESTRIL) 20 mg tablet Hold day of surgery  • multivitamin (THERAGRAN) TABS Stop taking 7 days prior to surgery  • NON FORMULARY Stop taking 1 day prior to surgery  • tamsulosin (FLOMAX) 0 4 mg Take night before surgery      Covid screening negative as per patient  Reviewed with patient via phone all medication instructions  Advised not to take any NSAID's, Vitamins or Herbal products prior to the DOS  Acetaminophen products are ok to take  Reviewed showering instructions as given by surgical office  Instructed to call office with any questions or concerns  Instructed about NPO after midnight the night before DOS, except sips of water with allowed medications in AM on DOS  Informed about call from Julio Hewitt with the time to arrive for the scheduled surgery  Patient verbalized understanding

## 2023-05-25 ENCOUNTER — ANESTHESIA EVENT (OUTPATIENT)
Dept: PERIOP | Facility: HOSPITAL | Age: 48
End: 2023-05-25

## 2023-05-26 ENCOUNTER — ANESTHESIA (OUTPATIENT)
Dept: PERIOP | Facility: HOSPITAL | Age: 48
End: 2023-05-26

## 2023-05-26 ENCOUNTER — TELEPHONE (OUTPATIENT)
Dept: UROLOGY | Facility: AMBULATORY SURGERY CENTER | Age: 48
End: 2023-05-26

## 2023-05-26 ENCOUNTER — HOSPITAL ENCOUNTER (OUTPATIENT)
Facility: HOSPITAL | Age: 48
Setting detail: OUTPATIENT SURGERY
Discharge: HOME | End: 2023-05-27
Attending: UROLOGY | Admitting: UROLOGY

## 2023-05-26 DIAGNOSIS — R33.8 BENIGN PROSTATIC HYPERPLASIA WITH URINARY RETENTION: ICD-10-CM

## 2023-05-26 DIAGNOSIS — N40.1 BENIGN PROSTATIC HYPERPLASIA WITH URINARY RETENTION: ICD-10-CM

## 2023-05-26 DIAGNOSIS — R33.9 URINARY RETENTION: Primary | ICD-10-CM

## 2023-05-26 DIAGNOSIS — I10 BENIGN HYPERTENSION: ICD-10-CM

## 2023-05-26 PROBLEM — Z79.899 MEDICAL MARIJUANA USE: Status: ACTIVE | Noted: 2023-05-26

## 2023-05-26 LAB
ABO GROUP BLD: NORMAL
ANION GAP SERPL CALCULATED.3IONS-SCNC: 3 MMOL/L (ref 4–13)
BUN SERPL-MCNC: 19 MG/DL (ref 5–25)
CALCIUM SERPL-MCNC: 8.6 MG/DL (ref 8.3–10.1)
CHLORIDE SERPL-SCNC: 108 MMOL/L (ref 96–108)
CO2 SERPL-SCNC: 24 MMOL/L (ref 21–32)
CREAT SERPL-MCNC: 0.97 MG/DL (ref 0.6–1.3)
ERYTHROCYTE [DISTWIDTH] IN BLOOD BY AUTOMATED COUNT: 12.1 % (ref 11.6–15.1)
GFR SERPL CREATININE-BSD FRML MDRD: 90 ML/MIN/1.73SQ M
GLUCOSE P FAST SERPL-MCNC: 102 MG/DL (ref 65–99)
GLUCOSE SERPL-MCNC: 102 MG/DL (ref 65–140)
HCT VFR BLD AUTO: 42.7 % (ref 36.5–49.3)
HGB BLD-MCNC: 14.6 G/DL (ref 12–17)
MCH RBC QN AUTO: 31.2 PG (ref 26.8–34.3)
MCHC RBC AUTO-ENTMCNC: 34.2 G/DL (ref 31.4–37.4)
MCV RBC AUTO: 91 FL (ref 82–98)
PLATELET # BLD AUTO: 172 THOUSANDS/UL (ref 149–390)
PMV BLD AUTO: 10 FL (ref 8.9–12.7)
POTASSIUM SERPL-SCNC: 4.1 MMOL/L (ref 3.5–5.3)
RBC # BLD AUTO: 4.68 MILLION/UL (ref 3.88–5.62)
RH BLD: NEGATIVE
SODIUM SERPL-SCNC: 135 MMOL/L (ref 135–147)
WBC # BLD AUTO: 7.83 THOUSAND/UL (ref 4.31–10.16)

## 2023-05-26 RX ORDER — ALBUTEROL SULFATE 2.5 MG/3ML
2.5 SOLUTION RESPIRATORY (INHALATION) ONCE AS NEEDED
Status: DISCONTINUED | OUTPATIENT
Start: 2023-05-26 | End: 2023-05-26 | Stop reason: HOSPADM

## 2023-05-26 RX ORDER — SODIUM CHLORIDE, SODIUM LACTATE, POTASSIUM CHLORIDE, CALCIUM CHLORIDE 600; 310; 30; 20 MG/100ML; MG/100ML; MG/100ML; MG/100ML
125 INJECTION, SOLUTION INTRAVENOUS CONTINUOUS
Status: DISCONTINUED | OUTPATIENT
Start: 2023-05-26 | End: 2023-05-26 | Stop reason: ALTCHOICE

## 2023-05-26 RX ORDER — SODIUM CHLORIDE 9 MG/ML
100 INJECTION, SOLUTION INTRAVENOUS CONTINUOUS
Status: DISCONTINUED | OUTPATIENT
Start: 2023-05-26 | End: 2023-05-27 | Stop reason: HOSPADM

## 2023-05-26 RX ORDER — HYDROCODONE BITARTRATE AND ACETAMINOPHEN 5; 325 MG/1; MG/1
2 TABLET ORAL EVERY 4 HOURS PRN
Status: DISCONTINUED | OUTPATIENT
Start: 2023-05-26 | End: 2023-05-27 | Stop reason: HOSPADM

## 2023-05-26 RX ORDER — FENTANYL CITRATE/PF 50 MCG/ML
25 SYRINGE (ML) INJECTION
Status: DISCONTINUED | OUTPATIENT
Start: 2023-05-26 | End: 2023-05-26 | Stop reason: HOSPADM

## 2023-05-26 RX ORDER — MAGNESIUM HYDROXIDE 1200 MG/15ML
3000 LIQUID ORAL CONTINUOUS
Status: DISCONTINUED | OUTPATIENT
Start: 2023-05-26 | End: 2023-05-26 | Stop reason: ALTCHOICE

## 2023-05-26 RX ORDER — GABAPENTIN 100 MG/1
100 CAPSULE ORAL 3 TIMES DAILY
Status: DISCONTINUED | OUTPATIENT
Start: 2023-05-26 | End: 2023-05-27 | Stop reason: HOSPADM

## 2023-05-26 RX ORDER — HYDROMORPHONE HCL IN WATER/PF 6 MG/30 ML
0.2 PATIENT CONTROLLED ANALGESIA SYRINGE INTRAVENOUS
Status: DISCONTINUED | OUTPATIENT
Start: 2023-05-26 | End: 2023-05-26 | Stop reason: HOSPADM

## 2023-05-26 RX ORDER — TAMSULOSIN HYDROCHLORIDE 0.4 MG/1
0.4 CAPSULE ORAL
Status: DISCONTINUED | OUTPATIENT
Start: 2023-05-26 | End: 2023-05-27 | Stop reason: HOSPADM

## 2023-05-26 RX ORDER — METOCLOPRAMIDE HYDROCHLORIDE 5 MG/ML
10 INJECTION INTRAMUSCULAR; INTRAVENOUS ONCE AS NEEDED
Status: DISCONTINUED | OUTPATIENT
Start: 2023-05-26 | End: 2023-05-26 | Stop reason: HOSPADM

## 2023-05-26 RX ORDER — OXYBUTYNIN CHLORIDE 5 MG/1
5 TABLET ORAL 3 TIMES DAILY
Status: DISCONTINUED | OUTPATIENT
Start: 2023-05-26 | End: 2023-05-27 | Stop reason: HOSPADM

## 2023-05-26 RX ORDER — HYDROCODONE BITARTRATE AND ACETAMINOPHEN 5; 325 MG/1; MG/1
1 TABLET ORAL EVERY 4 HOURS PRN
Status: DISCONTINUED | OUTPATIENT
Start: 2023-05-26 | End: 2023-05-27 | Stop reason: HOSPADM

## 2023-05-26 RX ORDER — SODIUM CHLORIDE 9 MG/ML
125 INJECTION, SOLUTION INTRAVENOUS CONTINUOUS
Status: DISCONTINUED | OUTPATIENT
Start: 2023-05-26 | End: 2023-05-26

## 2023-05-26 RX ORDER — DOCUSATE SODIUM 100 MG/1
100 CAPSULE, LIQUID FILLED ORAL 2 TIMES DAILY
Status: DISCONTINUED | OUTPATIENT
Start: 2023-05-26 | End: 2023-05-27 | Stop reason: HOSPADM

## 2023-05-26 RX ORDER — LABETALOL HYDROCHLORIDE 5 MG/ML
10 INJECTION, SOLUTION INTRAVENOUS
Status: DISCONTINUED | OUTPATIENT
Start: 2023-05-26 | End: 2023-05-26 | Stop reason: HOSPADM

## 2023-05-26 RX ORDER — ACETAMINOPHEN 325 MG/1
650 TABLET ORAL EVERY 6 HOURS SCHEDULED
Status: DISCONTINUED | OUTPATIENT
Start: 2023-05-26 | End: 2023-05-27 | Stop reason: HOSPADM

## 2023-05-26 RX ORDER — EPHEDRINE SULFATE 50 MG/ML
INJECTION INTRAVENOUS AS NEEDED
Status: DISCONTINUED | OUTPATIENT
Start: 2023-05-26 | End: 2023-05-26

## 2023-05-26 RX ORDER — ONDANSETRON 2 MG/ML
4 INJECTION INTRAMUSCULAR; INTRAVENOUS ONCE AS NEEDED
Status: DISCONTINUED | OUTPATIENT
Start: 2023-05-26 | End: 2023-05-26 | Stop reason: HOSPADM

## 2023-05-26 RX ORDER — ONDANSETRON 2 MG/ML
INJECTION INTRAMUSCULAR; INTRAVENOUS AS NEEDED
Status: DISCONTINUED | OUTPATIENT
Start: 2023-05-26 | End: 2023-05-26

## 2023-05-26 RX ORDER — HYDROCODONE BITARTRATE AND ACETAMINOPHEN 5; 325 MG/1; MG/1
1 TABLET ORAL EVERY 6 HOURS PRN
Qty: 6 TABLET | Refills: 0 | Status: SHIPPED | OUTPATIENT
Start: 2023-05-26 | End: 2023-06-05

## 2023-05-26 RX ORDER — HYDROMORPHONE HCL/PF 1 MG/ML
0.5 SYRINGE (ML) INJECTION EVERY 2 HOUR PRN
Status: DISCONTINUED | OUTPATIENT
Start: 2023-05-26 | End: 2023-05-27 | Stop reason: HOSPADM

## 2023-05-26 RX ORDER — GLYCINE 1.5 G/100ML
SOLUTION IRRIGATION AS NEEDED
Status: DISCONTINUED | OUTPATIENT
Start: 2023-05-26 | End: 2023-05-26 | Stop reason: HOSPADM

## 2023-05-26 RX ORDER — DEXTROSE, SODIUM CHLORIDE, SODIUM LACTATE, POTASSIUM CHLORIDE, AND CALCIUM CHLORIDE 5; .6; .31; .03; .02 G/100ML; G/100ML; G/100ML; G/100ML; G/100ML
125 INJECTION, SOLUTION INTRAVENOUS CONTINUOUS
Status: DISCONTINUED | OUTPATIENT
Start: 2023-05-26 | End: 2023-05-26 | Stop reason: ALTCHOICE

## 2023-05-26 RX ORDER — DEXAMETHASONE SODIUM PHOSPHATE 10 MG/ML
INJECTION, SOLUTION INTRAMUSCULAR; INTRAVENOUS AS NEEDED
Status: DISCONTINUED | OUTPATIENT
Start: 2023-05-26 | End: 2023-05-26

## 2023-05-26 RX ORDER — FENTANYL CITRATE 50 UG/ML
INJECTION, SOLUTION INTRAMUSCULAR; INTRAVENOUS AS NEEDED
Status: DISCONTINUED | OUTPATIENT
Start: 2023-05-26 | End: 2023-05-26

## 2023-05-26 RX ORDER — PROPOFOL 10 MG/ML
INJECTION, EMULSION INTRAVENOUS AS NEEDED
Status: DISCONTINUED | OUTPATIENT
Start: 2023-05-26 | End: 2023-05-26

## 2023-05-26 RX ORDER — BACITRACIN, NEOMYCIN, POLYMYXIN B 400; 3.5; 5 [USP'U]/G; MG/G; [USP'U]/G
1 OINTMENT TOPICAL 2 TIMES DAILY
Status: DISCONTINUED | OUTPATIENT
Start: 2023-05-26 | End: 2023-05-27 | Stop reason: HOSPADM

## 2023-05-26 RX ORDER — LIDOCAINE HYDROCHLORIDE 10 MG/ML
INJECTION, SOLUTION EPIDURAL; INFILTRATION; INTRACAUDAL; PERINEURAL AS NEEDED
Status: DISCONTINUED | OUTPATIENT
Start: 2023-05-26 | End: 2023-05-26

## 2023-05-26 RX ORDER — LIDOCAINE HYDROCHLORIDE 10 MG/ML
0.5 INJECTION, SOLUTION EPIDURAL; INFILTRATION; INTRACAUDAL; PERINEURAL ONCE AS NEEDED
Status: COMPLETED | OUTPATIENT
Start: 2023-05-26 | End: 2023-05-26

## 2023-05-26 RX ORDER — LISINOPRIL 20 MG/1
20 TABLET ORAL DAILY
Status: DISCONTINUED | OUTPATIENT
Start: 2023-05-27 | End: 2023-05-27 | Stop reason: HOSPADM

## 2023-05-26 RX ORDER — ONDANSETRON 2 MG/ML
4 INJECTION INTRAMUSCULAR; INTRAVENOUS EVERY 6 HOURS PRN
Status: DISCONTINUED | OUTPATIENT
Start: 2023-05-26 | End: 2023-05-27 | Stop reason: HOSPADM

## 2023-05-26 RX ORDER — HYDROMORPHONE HCL/PF 1 MG/ML
0.5 SYRINGE (ML) INJECTION
Status: DISCONTINUED | OUTPATIENT
Start: 2023-05-26 | End: 2023-05-26 | Stop reason: HOSPADM

## 2023-05-26 RX ORDER — ENOXAPARIN SODIUM 100 MG/ML
40 INJECTION SUBCUTANEOUS DAILY
Status: DISCONTINUED | OUTPATIENT
Start: 2023-05-27 | End: 2023-05-27 | Stop reason: HOSPADM

## 2023-05-26 RX ORDER — HYDRALAZINE HYDROCHLORIDE 20 MG/ML
5 INJECTION INTRAMUSCULAR; INTRAVENOUS
Status: DISCONTINUED | OUTPATIENT
Start: 2023-05-26 | End: 2023-05-26 | Stop reason: HOSPADM

## 2023-05-26 RX ORDER — PROMETHAZINE HYDROCHLORIDE 25 MG/ML
12.5 INJECTION, SOLUTION INTRAMUSCULAR; INTRAVENOUS ONCE AS NEEDED
Status: DISCONTINUED | OUTPATIENT
Start: 2023-05-26 | End: 2023-05-26 | Stop reason: HOSPADM

## 2023-05-26 RX ORDER — MIDAZOLAM HYDROCHLORIDE 2 MG/2ML
INJECTION, SOLUTION INTRAMUSCULAR; INTRAVENOUS AS NEEDED
Status: DISCONTINUED | OUTPATIENT
Start: 2023-05-26 | End: 2023-05-26

## 2023-05-26 RX ORDER — GLYCOPYRROLATE 0.2 MG/ML
INJECTION INTRAMUSCULAR; INTRAVENOUS AS NEEDED
Status: DISCONTINUED | OUTPATIENT
Start: 2023-05-26 | End: 2023-05-26

## 2023-05-26 RX ADMIN — SODIUM CHLORIDE FOR IRRIGATION 3000 ML: 0.9 SOLUTION IRRIGATION at 13:48

## 2023-05-26 RX ADMIN — FENTANYL CITRATE 25 MCG: 50 INJECTION INTRAMUSCULAR; INTRAVENOUS at 11:04

## 2023-05-26 RX ADMIN — ACETAMINOPHEN 650 MG: 325 TABLET ORAL at 23:50

## 2023-05-26 RX ADMIN — BACITRACIN ZINC, NEOMYCIN, POLYMYXIN B 1 SMALL APPLICATION: 400; 3.5; 5 OINTMENT TOPICAL at 16:49

## 2023-05-26 RX ADMIN — SODIUM CHLORIDE 100 ML/HR: 0.9 INJECTION, SOLUTION INTRAVENOUS at 21:17

## 2023-05-26 RX ADMIN — SODIUM CHLORIDE, SODIUM LACTATE, POTASSIUM CHLORIDE, AND CALCIUM CHLORIDE: .6; .31; .03; .02 INJECTION, SOLUTION INTRAVENOUS at 10:16

## 2023-05-26 RX ADMIN — OXYBUTYNIN CHLORIDE 5 MG: 5 TABLET ORAL at 17:24

## 2023-05-26 RX ADMIN — EPHEDRINE SULFATE 10 MG: 50 INJECTION INTRAVENOUS at 10:36

## 2023-05-26 RX ADMIN — FENTANYL CITRATE 25 MCG: 50 INJECTION, SOLUTION INTRAMUSCULAR; INTRAVENOUS at 09:57

## 2023-05-26 RX ADMIN — ACETAMINOPHEN 650 MG: 325 TABLET ORAL at 13:56

## 2023-05-26 RX ADMIN — DEXAMETHASONE SODIUM PHOSPHATE 10 MG: 10 INJECTION, SOLUTION INTRAMUSCULAR; INTRAVENOUS at 09:37

## 2023-05-26 RX ADMIN — DOCUSATE SODIUM 100 MG: 100 CAPSULE, LIQUID FILLED ORAL at 16:49

## 2023-05-26 RX ADMIN — SODIUM CHLORIDE, SODIUM LACTATE, POTASSIUM CHLORIDE, AND CALCIUM CHLORIDE 125 ML/HR: .6; .31; .03; .02 INJECTION, SOLUTION INTRAVENOUS at 09:18

## 2023-05-26 RX ADMIN — FENTANYL CITRATE 50 MCG: 50 INJECTION, SOLUTION INTRAMUSCULAR; INTRAVENOUS at 09:37

## 2023-05-26 RX ADMIN — GABAPENTIN 100 MG: 100 CAPSULE ORAL at 16:49

## 2023-05-26 RX ADMIN — MIDAZOLAM 2 MG: 1 INJECTION INTRAMUSCULAR; INTRAVENOUS at 09:26

## 2023-05-26 RX ADMIN — PROPOFOL 300 MG: 10 INJECTION, EMULSION INTRAVENOUS at 09:33

## 2023-05-26 RX ADMIN — ONDANSETRON 4 MG: 2 INJECTION INTRAMUSCULAR; INTRAVENOUS at 10:47

## 2023-05-26 RX ADMIN — LIDOCAINE HYDROCHLORIDE 50 MG: 10 INJECTION, SOLUTION EPIDURAL; INFILTRATION; INTRACAUDAL; PERINEURAL at 09:33

## 2023-05-26 RX ADMIN — CEFAZOLIN 2000 MG: 1 INJECTION, POWDER, FOR SOLUTION INTRAMUSCULAR; INTRAVENOUS at 09:41

## 2023-05-26 RX ADMIN — GLYCOPYRROLATE 0.1 MG: 0.2 INJECTION, SOLUTION INTRAMUSCULAR; INTRAVENOUS at 09:37

## 2023-05-26 RX ADMIN — FENTANYL CITRATE 25 MCG: 50 INJECTION, SOLUTION INTRAMUSCULAR; INTRAVENOUS at 10:16

## 2023-05-26 RX ADMIN — SODIUM CHLORIDE 100 ML/HR: 0.9 INJECTION, SOLUTION INTRAVENOUS at 14:59

## 2023-05-26 RX ADMIN — FENTANYL CITRATE 25 MCG: 50 INJECTION INTRAMUSCULAR; INTRAVENOUS at 11:26

## 2023-05-26 RX ADMIN — TAMSULOSIN HYDROCHLORIDE 0.4 MG: 0.4 CAPSULE ORAL at 16:49

## 2023-05-26 RX ADMIN — HYDRALAZINE HYDROCHLORIDE 5 MG: 20 INJECTION, SOLUTION INTRAMUSCULAR; INTRAVENOUS at 12:15

## 2023-05-26 RX ADMIN — FENTANYL CITRATE 25 MCG: 50 INJECTION INTRAMUSCULAR; INTRAVENOUS at 11:18

## 2023-05-26 RX ADMIN — LIDOCAINE HYDROCHLORIDE 0.5 ML: 10 INJECTION, SOLUTION EPIDURAL; INFILTRATION; INTRACAUDAL; PERINEURAL at 09:18

## 2023-05-26 RX ADMIN — LABETALOL HYDROCHLORIDE 10 MG: 5 INJECTION, SOLUTION INTRAVENOUS at 12:34

## 2023-05-26 RX ADMIN — FENTANYL CITRATE 25 MCG: 50 INJECTION INTRAMUSCULAR; INTRAVENOUS at 11:10

## 2023-05-26 RX ADMIN — GABAPENTIN 100 MG: 100 CAPSULE ORAL at 23:50

## 2023-05-26 NOTE — ANESTHESIA PREPROCEDURE EVALUATION
Procedure:  TRANSURETHRAL RESECTION OF PROSTATE (TURP) (Abdomen)    Relevant Problems   CARDIO   (+) Benign hypertension   (+) Coronary artery calcification   (+) Mixed hyperlipidemia      /RENAL   (+) Complex renal cyst      PULMONARY   (+) DEBORAH (obstructive sleep apnea)      Other   (+) Medical marijuana use   (+) Snoring        Physical Exam    Airway    Mallampati score: II  TM Distance: >3 FB  Neck ROM: full     Dental   No notable dental hx     Cardiovascular  Rhythm: regular, Rate: normal, Cardiovascular exam normal    Pulmonary  Pulmonary exam normal Breath sounds clear to auscultation,     Other Findings        Anesthesia Plan  ASA Score- 2     Anesthesia Type-         Additional Monitors:   Airway Plan: LMA  Plan Factors-Exercise tolerance (METS): >4 METS  Chart reviewed  Existing labs reviewed  Patient summary reviewed  Patient is a current smoker  Patient instructed to abstain from smoking on day of procedure  Patient did not smoke on day of surgery  Induction- intravenous  Postoperative Plan- Plan for postoperative opioid use  Planned trial extubation    Informed Consent- Anesthetic plan and risks discussed with patient  I personally reviewed this patient with the CRNA  Discussed and agreed on the Anesthesia Plan with the CRNA  Hao Mora

## 2023-05-26 NOTE — ANESTHESIA POSTPROCEDURE EVALUATION
Post-Op Assessment Note    CV Status:  Stable    Pain management: adequate     Mental Status:  Alert and awake   Hydration Status:  Euvolemic   PONV Controlled:  Controlled   Airway Patency:  Patent      Post Op Vitals Reviewed: Yes      Staff: CRNA   Comments: VSS report RN        No notable events documented      BP  159/72   Temp     Pulse  95   Resp      SpO2   99 RA

## 2023-05-26 NOTE — TELEPHONE ENCOUNTER
----- Message from Jesus Purvis MD sent at 5/26/2023 11:21 AM EDT -----  Patient underwent TURP  He has a red in place  Needs red removal next week on Tuesday or Wednesday  Any site close to patient  [address]? ?    6 weeks with PVR uroflow with AP        Thank you    FT

## 2023-05-26 NOTE — OP NOTE
OPERATIVE REPORT  PATIENT NAME: Rebeca Almazan    :  1975  MRN: [id number]  Pt Location: [facility] ROOM 01    SURGERY DATE: 2023    Surgeon(s) and Role:     * Radha Sommer MD - Primary    Preop Diagnosis:  Benign prostatic hyperplasia with urinary retention [N40 1, R33 8]    Post-Op Diagnosis Codes: * Benign prostatic hyperplasia with urinary retention [N40 1, R33 8]    Procedure(s):  TRANSURETHRAL RESECTION OF PROSTATE (TURP)    Specimen(s):  ID Type Source Tests Collected by Time Destination   1 :  Tissue Prostate TISSUE EXAM Radha Sommer MD 2023 1000    A :  Urine Urine, Cystoscopic URINE CULTURE Radha Sommer MD 2023 4178        Estimated Blood Loss:   Minimal    Drains:  Continuous Bladder Irrigation (Active)   Number of days: 0       Anesthesia Type:   General    Operative Indications:  Benign prostatic hyperplasia with urinary retention [N40 1, R33 8]        Operative Findings:  Standard TURP     Complications:   none    Procedure and Technique:  Moraima Tello is a 61-year-old male with intractable lower urinary tract symptoms despite medical management  Risk and benefits of TURP were discussed and reviewed  Informed consent was obtained  The patient is brought to the operative room on May 26, 2023  After the smooth induction of general LMA anesthesia, the patient was placed in the dorsal lithotomy position  Intravenous anabiotic's were administered  Venodyne boots were applied  A timeout was performed with all members of the operative team confirming the patient's identity and procedure to be performed  A 22 [place] rigid cystoscope with 30° lens was inserted  The bladder was thoroughly inspected  There was lateral lobe hyperplasia of the prostate  A small median lobe with intravesical protrusion of the prostate was noted  The bladder was entered  A urine culture was obtained  The bladder was thick-walled and trabeculated   There was no evidence of mucosal abnormalities or lesions  Ureteral orifices were visualized along the trigonal ridge  The cystoscope was exchanged for the continuous-flow resectoscope  Both ureteral orifices were marked just distal to the opening  A standard TURP was performed  Resection started with floor tissue  No significant median lobe was noted  At no point during the procedure did any resection occur distal to the vera montanum  Next I took down both lateral lobes of the prostate from the bladder neck proximally to the verumontanum distally  A moderate amount of anterior tissue then fell into the urethra and this was taken down as well  The Ellik last was utilized to remove all chips  The bladder was thoroughly reinspected  There were no chips remaining  Both ureteral orifices were visualized intact  Cautery was used for hemostasis  A 24 Polish three-way hematuria coudé-tip Parnell catheter was then inserted  40 cc were placed into the balloon  Continuous bladder irrigation was initiated and was noted to be clear upon departure from the operating room  Overall the patient tolerated the procedure well and there were no complications  The patient was extubated in the operating room transferred to the PACU in stable condition at the conclusion of the case      Patient Disposition:  PACU Stable and extubated        SIGNATURE: Merlinda Cordial, MD  DATE: May 26, 2023  TIME: 11:09 AM

## 2023-05-26 NOTE — INTERVAL H&P NOTE
H&P reviewed  After examining the patient I find no changes in the patients condition since the H&P had been written  Vitals:    05/26/23 0858   BP: (!) 194/111   Pulse: 59   Resp: 16   Temp: (!) 97 3 °F (36 3 °C)   SpO2: 8%     73-year-old male with a history of urinary retention secondary to BPH  Recent cystoscopy in the office confirmed anatomy most ideal for TURP  Preoperative PSA 2 2  Risks of the procedure including, but not limited to, bleeding, infection, electrolyte abnormalities, persistent retention, incontinence, erectile dysfunction and retrograde ejaculation were discussed and reviewed and informed consent was obtained

## 2023-05-26 NOTE — TELEPHONE ENCOUNTER
Patient already scheduled for follow up added PVR uroflow   There are no nurse visits available in [facility] - will discuss at discharge which office patient would like to go to

## 2023-05-27 VITALS
WEIGHT: 175 LBS | OXYGEN SATURATION: 98 % | BODY MASS INDEX: 24.5 KG/M2 | HEART RATE: 54 BPM | RESPIRATION RATE: 18 BRPM | SYSTOLIC BLOOD PRESSURE: 167 MMHG | TEMPERATURE: 98.1 F | HEIGHT: 71 IN | DIASTOLIC BLOOD PRESSURE: 78 MMHG

## 2023-05-27 LAB
ANION GAP SERPL CALCULATED.3IONS-SCNC: 4 MMOL/L (ref 4–13)
BUN SERPL-MCNC: 19 MG/DL (ref 5–25)
CALCIUM SERPL-MCNC: 8.9 MG/DL (ref 8.3–10.1)
CHLORIDE SERPL-SCNC: 108 MMOL/L (ref 96–108)
CO2 SERPL-SCNC: 25 MMOL/L (ref 21–32)
CREAT SERPL-MCNC: 0.98 MG/DL (ref 0.6–1.3)
ERYTHROCYTE [DISTWIDTH] IN BLOOD BY AUTOMATED COUNT: 12.4 % (ref 11.6–15.1)
GFR SERPL CREATININE-BSD FRML MDRD: 89 ML/MIN/1.73SQ M
GLUCOSE SERPL-MCNC: 114 MG/DL (ref 65–140)
HCT VFR BLD AUTO: 41 % (ref 36.5–49.3)
HGB BLD-MCNC: 14 G/DL (ref 12–17)
MCH RBC QN AUTO: 31.5 PG (ref 26.8–34.3)
MCHC RBC AUTO-ENTMCNC: 34.1 G/DL (ref 31.4–37.4)
MCV RBC AUTO: 92 FL (ref 82–98)
PLATELET # BLD AUTO: 180 THOUSANDS/UL (ref 149–390)
PMV BLD AUTO: 10.3 FL (ref 8.9–12.7)
POTASSIUM SERPL-SCNC: 3.8 MMOL/L (ref 3.5–5.3)
RBC # BLD AUTO: 4.45 MILLION/UL (ref 3.88–5.62)
SODIUM SERPL-SCNC: 137 MMOL/L (ref 135–147)
WBC # BLD AUTO: 10.49 THOUSAND/UL (ref 4.31–10.16)

## 2023-05-27 RX ADMIN — ENOXAPARIN SODIUM 40 MG: 40 INJECTION SUBCUTANEOUS at 08:32

## 2023-05-27 RX ADMIN — GABAPENTIN 100 MG: 100 CAPSULE ORAL at 08:32

## 2023-05-27 RX ADMIN — LISINOPRIL 20 MG: 20 TABLET ORAL at 08:32

## 2023-05-27 RX ADMIN — ACETAMINOPHEN 650 MG: 325 TABLET ORAL at 11:19

## 2023-05-27 RX ADMIN — BACITRACIN ZINC, NEOMYCIN, POLYMYXIN B 1 SMALL APPLICATION: 400; 3.5; 5 OINTMENT TOPICAL at 08:32

## 2023-05-27 RX ADMIN — OXYBUTYNIN CHLORIDE 5 MG: 5 TABLET ORAL at 08:32

## 2023-05-27 RX ADMIN — ACETAMINOPHEN 650 MG: 325 TABLET ORAL at 05:27

## 2023-05-27 RX ADMIN — DOCUSATE SODIUM 100 MG: 100 CAPSULE, LIQUID FILLED ORAL at 08:32

## 2023-05-27 NOTE — PROGRESS NOTES
"Progress Note - Abram Connelly 46 y o  male MRN: [id number]    Unit/Bed#: -01 Encounter: [id number]      Assessment:  Postoperative day 1 status post TURP by Dr Coco Krause  Afebrile, vital signs stable  Doing well  Plan:  Discharge home with Parnell catheter in place, all arrangements made by Dr Coco Krause  Reviewed all discharge instructions  Office contacted to have him set up for trial of voiding on Tuesday  Subjective:   No major complaints    Objective: Doing well postoperative day #1, discussed the operation and follow-up  Vitals: Blood pressure 167/78, pulse (!) 54, temperature 98 1 °F (36 7 °C), resp  rate 18, height 5' 11\" (1 803 m), weight 79 4 kg (175 lb), SpO2 98 %  ,Body mass index is 24 41 kg/m²  Intake/Output Summary (Last 24 hours) at 5/27/2023 1599  Last data filed at 5/27/2023 0501  Gross per 24 hour   Intake 1780 ml   Output 3075 ml   Net -1295 ml       Physical Exam: Awake, alert and in no apparent distress  Urine is light pink  Catheter in place three-way  Invasive Devices       Peripheral Intravenous Line  Duration             Peripheral IV 05/26/23 Left Antecubital <1 day              Drain  Duration             Continuous Bladder Irrigation <1 day                    Lab, Imaging and other studies: I have personally reviewed pertinent reports      VTE Pharmacologic Prophylaxis: Sequential compression device (Venodyne)   VTE Mechanical Prophylaxis: sequential compression device   "

## 2023-05-27 NOTE — DISCHARGE INSTR - AVS FIRST PAGE
Expect to see blood in the urine, and have burning urgency and frequency when the catheter is removed  Call for fever greater than 101 5 degrees, catheter problems or inability to urinate after the catheter is out  No heavy lifting greater than 15 pounds for 2 weeks  Take over-the-counter remedies to avoid constipation  You may resume all your normal medications and regular diet  You may walk shower and take stairs  No driving if taking narcotics       See us in the [place] office for nurse visit to remove catheter on Tuesday

## 2023-05-28 LAB — BACTERIA UR CULT: NORMAL

## 2023-05-30 ENCOUNTER — TELEPHONE (OUTPATIENT)
Dept: UROLOGY | Facility: CLINIC | Age: 48
End: 2023-05-30

## 2023-05-30 ENCOUNTER — PROCEDURE VISIT (OUTPATIENT)
Dept: UROLOGY | Facility: CLINIC | Age: 48
End: 2023-05-30

## 2023-05-30 DIAGNOSIS — R39.9 LOWER URINARY TRACT SYMPTOMS (LUTS): Primary | ICD-10-CM

## 2023-05-30 LAB — POST-VOID RESIDUAL VOLUME, ML POC: 112 ML

## 2023-05-30 NOTE — TELEPHONE ENCOUNTER
----- Message from Bella Romero MD sent at 5/27/2023  9:28 AM EDT -----  Please call patient to set up nurse visit in the [facility] office for removal of the catheter on Tuesday  Dr Neftali Hill did a TURP on him on Friday

## 2023-05-30 NOTE — TELEPHONE ENCOUNTER
----- Message from Jason Rodriguez MD sent at 5/27/2023  9:28 AM EDT -----  Please call patient to set up nurse visit in the [facility] office for removal of the catheter on Tuesday  Dr Marcy Parker did a TURP on him on Friday

## 2023-05-30 NOTE — PROGRESS NOTES
5/30/2023    Louis Barahona  1975  [id number]    Diagnosis      Post-Op    Plan  Patient presents for red removal      Procedure Red removal/voiding trial    Red catheter removed after deflation of an intact balloon  Patient tolerated well  Encouraged patient to hydrate well and return this afternoon for post void residual   He knows he may return early if uncomfortable and unable to urinate  Patient agrees to this plan  Patient returned this afternoon  Patient states he was to void  Patient voided 30 minutes prior to visit, Bladder ultrasound performed and PVR measured 112 ml  Recent Results (from the past 4 hour(s))   POCT Measure PVR    Collection Time: 05/30/23  2:09 PM   Result Value Ref Range    POST-VOID RESIDUAL VOLUME, ML  mL           There were no vitals filed for this visit          Mary Ellen Márquez MA

## 2023-06-23 ENCOUNTER — OFFICE VISIT (OUTPATIENT)
Dept: UROLOGY | Facility: CLINIC | Age: 48
End: 2023-06-23

## 2023-06-23 VITALS
SYSTOLIC BLOOD PRESSURE: 136 MMHG | HEIGHT: 71 IN | OXYGEN SATURATION: 100 % | HEART RATE: 58 BPM | DIASTOLIC BLOOD PRESSURE: 82 MMHG | BODY MASS INDEX: 23.94 KG/M2 | WEIGHT: 171 LBS

## 2023-06-23 DIAGNOSIS — R33.8 BENIGN PROSTATIC HYPERPLASIA WITH URINARY RETENTION: Primary | ICD-10-CM

## 2023-06-23 DIAGNOSIS — N40.1 BENIGN PROSTATIC HYPERPLASIA WITH URINARY RETENTION: Primary | ICD-10-CM

## 2023-06-23 PROCEDURE — 99024 POSTOP FOLLOW-UP VISIT: CPT | Performed by: NURSE PRACTITIONER

## 2023-06-23 NOTE — PROGRESS NOTES
6/23/2023      Chief Complaint   Patient presents with   • Post-op     Assessment and Plan    46 y o  male managed by Dr Salvador Oswald    1  Benign Prostatic Hyperplasia   · Continue to monitor for resolution of lower urinary tract symptoms postoperatively  · We discussed the need to maintain adequate hydration while avoiding bladder retaining foods and beverages   · Ensure complete emptying with urination  · Kegel/pelvic floor exercises  · Bladder scan PVR, AUA, uroflow at next office visit in 3 months  · Follow-up in the office in 3 months        History of Present Illness  Riccardo Castaneda is a 46 y o  male here for follow up evaluation of  benign prostatic hyperplasia with lower urinary tract symptoms including difficulty urinating, sensation of incomplete bladder emptying with urination, urinary hesitancy  He was previously trialed on Flomax with mild to moderate resolution of lower urinary tract symptoms  Cystoscopy performed 4/13/2023 with findings of lateral lobe coaptation of the prostate with significant intravesicular protrusion  No discrete median lobe was noted  He was managed with Parnell catheter secondary to urinary retention and subsequently learned how to perform CIC  He presents to the office today reporting no longer the need to perform CIC and is measuring his urine every time he urinates  He reports sensation of complete bladder emptying with urination at this time  He denies presence of urinary tract infections  Patient is post TURP 5/26/2023 with Dr Salvador Oswald  Postoperatively he reports an increase in his urinary stream   He continues to complain of mild urinary frequency more so in the morning but resolves as the day progresses  He reports urinary urgency; more significant upon changing position from sitting to standing  He also complains of increased pressure and discomfort after emptying his urinary bladder to completion        Review of Systems   Constitutional: Negative for chills and fever  Respiratory: Negative for cough and shortness of breath  Cardiovascular: Negative for chest pain  Gastrointestinal: Negative for abdominal distention, abdominal pain, blood in stool, nausea and vomiting  Genitourinary: Positive for frequency and urgency  Negative for difficulty urinating, dysuria, enuresis, flank pain and hematuria  Skin: Negative for rash  AUA SYMPTOM SCORE    Flowsheet Row Most Recent Value   AUA SYMPTOM SCORE    How often have you had a sensation of not emptying your bladder completely after you finished urinating? 0 (P)     How often have you had to urinate again less than two hours after you finished urinating? 2 (P)     How often have you found you stopped and started again several times when you urinate? 0 (P)     How often have you found it difficult to postpone urination? 2 (P)     How often have you had a weak urinary stream? 0 (P)     How often have you had to push or strain to begin urination? 0 (P)     How many times did you most typically get up to urinate from the time you went to bed at night until the time you got up in the morning? 1 (P)     Quality of Life: If you were to spend the rest of your life with your urinary condition just the way it is now, how would you feel about that? 1 (P)     AUA SYMPTOM SCORE 5 (P)              Past Medical History  Past Medical History:   Diagnosis Date   • Hypertension    • Pneumonia        Past Social History  Past Surgical History:   Procedure Laterality Date   • JOINT REPLACEMENT      hip right   • NO PAST SURGERIES     • AR TRURL ELECTROSURG RESCJ PROSTATE BLEED COMPLETE N/A 5/26/2023    Procedure: TRANSURETHRAL RESECTION OF PROSTATE (TURP);   Surgeon: Marily Agudelo MD;  Location: [facility];  Service: Urology     Social History     Tobacco Use   Smoking Status Former   • Packs/day: 1 00   • Years: 20 00   • Total pack years: 20 00   • Types: Cigarettes   • Start date: 34   • Quit date: 10/1/2019   • Years since quitting: 3 7   • Passive exposure: Past   Smokeless Tobacco Never       Past Family History  Family History   Adopted: Yes   Problem Relation Age of Onset   • Coronary artery disease Father    • Cancer Father         Bladder cancer   • No Known Problems Mother    • Heart disease Son         Heart murmer-       Past Social history  Social History     Socioeconomic History   • Marital status: Legally      Spouse name: Not on file   • Number of children: Not on file   • Years of education: Not on file   • Highest education level: Not on file   Occupational History   • Not on file   Tobacco Use   • Smoking status: Former     Packs/day: 1 00     Years: 20 00     Total pack years: 20 00     Types: Cigarettes     Start date: 34     Quit date: 10/1/2019     Years since quitting: 3 7     Passive exposure: Past   • Smokeless tobacco: Never   Vaping Use   • Vaping Use: Never used   Substance and Sexual Activity   • Alcohol use: Not Currently     Comment: Social use   • Drug use: Yes     Frequency: 7 0 times per week     Types: Marijuana     Comment: Medical marijuana   • Sexual activity: Yes     Partners: Female     Birth control/protection: Condom Male   Other Topics Concern   • Not on file   Social History Narrative   • Not on file     Social Determinants of Health     Financial Resource Strain: Not on file   Food Insecurity: Not on file   Transportation Needs: Not on file   Physical Activity: Sufficiently Active (10/3/2022)    Exercise Vital Sign    • Days of Exercise per Week: 5 days    • Minutes of Exercise per Session: 60 min   Stress: Stress Concern Present (1/19/2021)    [address]    • Feeling of Stress : Rather much   Social Connections: Not on file   Intimate Partner Violence: Not on file   Housing Stability: Not on file       Current Medications  Current Outpatient Medications   Medication Sig Dispense Refill   • acetaminophen "(TYLENOL) 325 mg tablet Take 650 mg by mouth every 6 (six) hours as needed for mild pain     • ibuprofen (MOTRIN) 200 mg tablet Take 200 mg by mouth every 6 (six) hours as needed for mild pain     • lisinopril (ZESTRIL) 20 mg tablet Take 1 tablet (20 mg total) by mouth daily Take 10 mg for 1 week and increase it to 20 mg daily (Patient taking differently: Take 20 mg by mouth daily) 90 tablet 2   • multivitamin (THERAGRAN) TABS Take 1 tablet by mouth daily     • NON FORMULARY Medical Marijuana daily     • tamsulosin (FLOMAX) 0 4 mg Take 1 capsule (0 4 mg total) by mouth daily with dinner 90 capsule 1     No current facility-administered medications for this visit  Allergies  No Known Allergies      The following portions of the patient's history were reviewed and updated as appropriate: allergies, current medications, past medical history, past social history, past surgical history and problem list       Vitals  Vitals:    06/23/23 0813   BP: 136/82   Pulse: 58   SpO2: 100%   Weight: 77 6 kg (171 lb)   Height: 5' 11\" (1 803 m)           Physical Exam  Physical Exam  Vitals reviewed  Constitutional:       General: He is not in acute distress  Appearance: Normal appearance  He is normal weight  HENT:      Head: Normocephalic  Pulmonary:      Effort: No respiratory distress  Breath sounds: Normal breath sounds  Skin:     General: Skin is warm and dry  Neurological:      General: No focal deficit present  Mental Status: He is alert and oriented to person, place, and time     Psychiatric:         Mood and Affect: Mood normal          Behavior: Behavior normal            Results  No results found for this or any previous visit (from the past 1 hour(s)) ]  Lab Results   Component Value Date    PSA 2 2 05/12/2023    PSA 1 6 05/03/2022     Lab Results   Component Value Date    CALCIUM 8 9 05/27/2023    K 3 8 05/27/2023    CO2 25 05/27/2023     05/27/2023    BUN 19 05/27/2023    CREATININE " 0 98 05/27/2023     Lab Results   Component Value Date    WBC 10 49 (H) 05/27/2023    HGB 14 0 05/27/2023    HCT 41 0 05/27/2023    MCV 92 05/27/2023     05/27/2023           Orders  No orders of the defined types were placed in this encounter        JULIO Romero

## 2023-07-01 DIAGNOSIS — I10 BENIGN HYPERTENSION: ICD-10-CM

## 2023-07-03 ENCOUNTER — HOSPITAL ENCOUNTER (OUTPATIENT)
Dept: ULTRASOUND IMAGING | Facility: HOSPITAL | Age: 48
Discharge: HOME | End: 2023-07-03
Payer: COMMERCIAL

## 2023-07-03 DIAGNOSIS — N28.1 COMPLEX RENAL CYST: ICD-10-CM

## 2023-07-03 PROCEDURE — 76775 US EXAM ABDO BACK WALL LIM: CPT

## 2023-07-05 RX ORDER — LISINOPRIL 20 MG/1
20 TABLET ORAL DAILY
Qty: 90 TABLET | Refills: 3 | Status: SHIPPED | OUTPATIENT
Start: 2023-07-05

## 2023-08-15 ENCOUNTER — TELEPHONE (OUTPATIENT)
Age: 48
End: 2023-08-15

## 2023-08-15 NOTE — TELEPHONE ENCOUNTER
Patient sent a mychart mssg wanted to schedule appt w/ venus rodriguezgd him back with new phone #    Please contact pt to schedule

## 2023-08-29 ENCOUNTER — OFFICE VISIT (OUTPATIENT)
Age: 48
End: 2023-08-29
Payer: COMMERCIAL

## 2023-08-29 VITALS
SYSTOLIC BLOOD PRESSURE: 146 MMHG | WEIGHT: 175 LBS | BODY MASS INDEX: 24.5 KG/M2 | OXYGEN SATURATION: 98 % | HEART RATE: 52 BPM | HEIGHT: 71 IN | DIASTOLIC BLOOD PRESSURE: 88 MMHG

## 2023-08-29 DIAGNOSIS — Z13.0 SCREENING FOR IRON DEFICIENCY ANEMIA: ICD-10-CM

## 2023-08-29 DIAGNOSIS — Z13.29 SCREENING FOR THYROID DISORDER: ICD-10-CM

## 2023-08-29 DIAGNOSIS — I10 BENIGN HYPERTENSION: ICD-10-CM

## 2023-08-29 DIAGNOSIS — I44.7 LEFT BUNDLE BRANCH BLOCK: ICD-10-CM

## 2023-08-29 DIAGNOSIS — R93.1 ELEVATED CORONARY ARTERY CALCIUM SCORE: ICD-10-CM

## 2023-08-29 DIAGNOSIS — R73.01 IMPAIRED FASTING GLUCOSE: ICD-10-CM

## 2023-08-29 DIAGNOSIS — Z12.11 ENCOUNTER FOR SCREENING COLONOSCOPY: Primary | ICD-10-CM

## 2023-08-29 DIAGNOSIS — E78.2 MIXED HYPERLIPIDEMIA: ICD-10-CM

## 2023-08-29 DIAGNOSIS — B36.9 FUNGAL RASH OF TORSO: ICD-10-CM

## 2023-08-29 PROCEDURE — 99396 PREV VISIT EST AGE 40-64: CPT

## 2023-08-29 RX ORDER — PRENATAL VIT 91/IRON/FOLIC/DHA 28-975-200
COMBINATION PACKAGE (EA) ORAL 2 TIMES DAILY
Qty: 15 G | Refills: 2 | Status: SHIPPED | OUTPATIENT
Start: 2023-08-29

## 2023-08-29 NOTE — PROGRESS NOTES
INTERNAL MEDICINE FOLLOW-UP VISIT  [facility] - [facility] INTERNAL MEDICINE [address]    NAME: Brooke Sandoval  AGE: 46 y.o. SEX: male  : 1975     DATE: 2023     Assessment and Plan:   1. Fungal rash of torso  To left lower back. Presents as fungal rash. No itching, no pain. Will treat with Lamisil twice a day. Notify office if no improvement  - terbinafine (LamISIL) 1 % cream; Apply topically 2 (two) times a day  Dispense: 15 g; Refill: 2    2. Left bundle branch block  Noted on pre-op EKG in May, also has some dizziness with position changes. Will stress NM. Discussed this with Amira Jewell and he is understanding results of EKG and need for further testing  - NM myocardial perfusion spect (rx stress and/or rest); Future    3. Encounter for screening colonoscopy  Due , referral provided  - Ambulatory Referral to Gastroenterology; Future    4. Benign hypertension  Elevated today but he did not take lisinopril prior to visit. Limit salt in diet to no more than 2000 mg per day. Take lisinopril daily. Check BP at home if able at least 3 times per week and record. Provide record of trends at next visit. 5. Elevated calcium score  Was recommended to start a statin but he did not. Would like to defer at this time. ASCVD score 5%. Will check lipids at this time. Did discuss that the LAD is where the calcium buildup is located and this is a major vessel that feeds his heart with blood supply and that a build up of calcium/plaque in that area is dangerous. He would like to wait until he sees his LIPID profile results. Will order today      No follow-ups on file. Chief Complaint:     Chief Complaint   Patient presents with   • Physical Exam      History of Present Illness:     TURP completed in May for urinary retention and doing well. EKG completed in May with LBB, no follow up at that time as this was ordered by urology.    Calcium score 136 recommended statin at that time but did not start on it at that time  Weight loss through diet and exercise changes. Wants to wait to start statin until he sees and reviews his lipid profile  Dizziness with position changes, bradycardia       The following portions of the patient's history were reviewed and updated as appropriate: allergies, current medications, past family history, past medical history, past social history, past surgical history and problem list.     Review of Systems:     Review of Systems   Constitutional: Negative for appetite change, chills, diaphoresis, fatigue, fever and unexpected weight change. HENT: Negative for postnasal drip and sneezing. Eyes: Negative for visual disturbance. Respiratory: Negative for chest tightness and shortness of breath. Cardiovascular: Negative for chest pain, palpitations and leg swelling. Gastrointestinal: Negative for abdominal pain and blood in stool. Endocrine: Negative for cold intolerance, heat intolerance, polydipsia, polyphagia and polyuria. Genitourinary: Negative for difficulty urinating, dysuria, frequency and urgency. Musculoskeletal: Negative for arthralgias and myalgias. Skin: Positive for rash. Negative for wound. Neurological: Positive for dizziness. Negative for weakness, light-headedness and headaches. Hematological: Negative for adenopathy. Psychiatric/Behavioral: Negative for confusion, dysphoric mood and sleep disturbance. The patient is not nervous/anxious.          Past Medical History:     Past Medical History:   Diagnosis Date   • Hypertension    • Pneumonia         Current Medications:     Current Outpatient Medications:   •  acetaminophen (TYLENOL) 325 mg tablet, Take 650 mg by mouth every 6 (six) hours as needed for mild pain, Disp: , Rfl:   •  ibuprofen (MOTRIN) 200 mg tablet, Take 200 mg by mouth every 6 (six) hours as needed for mild pain, Disp: , Rfl:   •  lisinopril (ZESTRIL) 20 mg tablet, Take 1 tablet (20 mg total) by mouth daily, Disp: 90 tablet, Rfl: 3  •  multivitamin (THERAGRAN) TABS, Take 1 tablet by mouth daily, Disp: , Rfl:   •  NON FORMULARY, Medical Marijuana daily, Disp: , Rfl:   •  terbinafine (LamISIL) 1 % cream, Apply topically 2 (two) times a day, Disp: 15 g, Rfl: 2     Allergies:   No Known Allergies     Physical Exam:     /88 (BP Location: Left arm, Patient Position: Sitting, Cuff Size: Standard)   Pulse (!) 52   Ht 5' 11" (1.803 m)   Wt 79.4 kg (175 lb)   SpO2 98%   BMI 24.41 kg/m²     Physical Exam  Constitutional:       Appearance: He is well-developed. HENT:      Head: Normocephalic and atraumatic. Eyes:      Conjunctiva/sclera: Conjunctivae normal.      Pupils: Pupils are equal, round, and reactive to light. Cardiovascular:      Rate and Rhythm: Regular rhythm. Bradycardia present. Heart sounds: Normal heart sounds. Pulmonary:      Effort: Pulmonary effort is normal.      Breath sounds: Normal breath sounds. Abdominal:      General: Bowel sounds are normal.      Palpations: Abdomen is soft. Musculoskeletal:         General: Normal range of motion. Cervical back: Normal range of motion. Skin:     General: Skin is warm and dry. Findings: Erythema and rash present. Neurological:      Mental Status: He is alert and oriented to person, place, and time. Data:     Laboratory Results: I have personally reviewed the pertinent laboratory results/reports   Radiology/Other Diagnostic Testing Results: I have personally reviewed pertinent reports.       Liz Lynn, [id number] [facility] INTERNAL MEDICINE [address]

## 2023-09-21 ENCOUNTER — OFFICE VISIT (OUTPATIENT)
Age: 48
End: 2023-09-21
Payer: COMMERCIAL

## 2023-09-21 VITALS
OXYGEN SATURATION: 98 % | WEIGHT: 174 LBS | HEART RATE: 74 BPM | BODY MASS INDEX: 24.36 KG/M2 | HEIGHT: 71 IN | SYSTOLIC BLOOD PRESSURE: 128 MMHG | DIASTOLIC BLOOD PRESSURE: 84 MMHG | RESPIRATION RATE: 18 BRPM

## 2023-09-21 DIAGNOSIS — Z12.11 ENCOUNTER FOR SCREENING COLONOSCOPY: ICD-10-CM

## 2023-09-21 PROCEDURE — 99203 OFFICE O/P NEW LOW 30 MIN: CPT | Performed by: INTERNAL MEDICINE

## 2023-09-21 RX ORDER — IBUPROFEN 600 MG/1
TABLET ORAL
COMMUNITY
Start: 2023-09-07

## 2023-09-21 RX ORDER — HYDROCODONE BITARTRATE AND ACETAMINOPHEN 5; 325 MG/1; MG/1
TABLET ORAL
COMMUNITY
Start: 2023-09-07

## 2023-09-21 NOTE — PROGRESS NOTES
[facility] - Outpatient Note  Jessica Taylor 46 y.o. male MRN: [id number]  Encounter: [id number]      ASSESSMENT AND PLAN:    Jessica Taylor is a 46 y.o. old pleasant male with PMH of BPH, hyperlipidemia, medical marijuana usage, DEBORAH, polycythemia who presents for consultation to discuss colon cancer screening    Colon cancer screening-patient has never had colonoscopy. He is asymptomatic. He recently had TURP done for BPH and would like to hold off on any colonoscopy at this time. He states he wants to do colonoscopy but just later possibly early next year in January. He does not want to do the Cologuard. I spoke to him in depth about the risks and benefits of colonoscopy. He is agreeable with colonoscopy however would like to mentally prepare himself. · Patient will call back to schedule colonoscopy likely early next year      Hepatitis C screening-hepatitis C antibody negative in 2021  · No further screening needed    GERD-patient reports burning sensation focally in the lower substernal.  This occurs only once per month and is not bothersome. He is not on medication. No previous EGD. · GERD lifestyle changes discussed, including avoidance of trigger foods (potential foods include coffee, caffeine, chocolate, mint, tomato-based products, spicy foods, fatty foods), avoid tight fitting clothing, elevated head of bed 30 degrees, avoid eating 2-3 hours prior to bedtime, weight loss, avoid alcohol, avoid tobacco use. 1. Encounter for screening colonoscopy  - Ambulatory Referral to Gastroenterology    ______________________________________________________________________    SUBJECTIVE:  Jessica Taylor is a 46 y.o. old pleasant male with PMH of BPH, hyperlipidemia, medical marijuana usage, DEBORAH, polycythemia who presents for consultation to discuss colon cancer screening. Patient denies abdominal pain, nausea, vomiting, constipation, diarrhea, fevers/chills.       Answers for HPI/ROS submitted by the patient on 9/20/2023  Pain - numeric: 0/10  anorexia: No  arthralgias: No  belching: No  constipation: No  diarrhea: No  dysuria: No  fever: No  flatus: No  frequency: No  headaches: No  hematochezia: No  hematuria: No  melena: No  myalgias: No  nausea: No  weight loss: No  vomiting: No      I reviewed prior external notes    I reviewed previous lab results and images      REVIEW OF SYSTEMS:     REVIEW OF ALL OTHER SYSTEMS IS OTHERWISE NEGATIVE. Historical Information   Past Medical History:   Diagnosis Date   • Hypertension    • Pneumonia      Past Surgical History:   Procedure Laterality Date   • JOINT REPLACEMENT      hip right   • NO PAST SURGERIES     • MD TRURL ELECTROSURG RESCJ PROSTATE BLEED COMPLETE N/A 5/26/2023    Procedure: TRANSURETHRAL RESECTION OF PROSTATE (TURP);   Surgeon: Mckay Alberts MD;  Location: [facility] OR;  Service: Urology     Social History   Social History     Substance and Sexual Activity   Alcohol Use Not Currently    Comment: Social use     Social History     Substance and Sexual Activity   Drug Use Yes   • Frequency: 7.0 times per week   • Types: Marijuana    Comment: Medical marijuana     Social History     Tobacco Use   Smoking Status Former   • Packs/day: 1.00   • Years: 20.00   • Total pack years: 20.00   • Types: Cigarettes   • Start date: 34   • Quit date: 10/1/2019   • Years since quitting: 3.9   • Passive exposure: Past   Smokeless Tobacco Never     Family History   Adopted: Yes   Problem Relation Age of Onset   • Coronary artery disease Father    • Cancer Father         Bladder cancer   • No Known Problems Mother    • Heart disease Son         Heart murmer-       Meds/Allergies       Current Outpatient Medications:   •  lisinopril (ZESTRIL) 20 mg tablet  •  acetaminophen (TYLENOL) 325 mg tablet  •  HYDROcodone-acetaminophen (NORCO) 5-325 mg per tablet  •  ibuprofen (MOTRIN) 200 mg tablet  •  ibuprofen (MOTRIN) 600 mg tablet  • multivitamin (THERAGRAN) TABS  •  NON FORMULARY  •  terbinafine (LamISIL) 1 % cream    No Known Allergies        Objective     Blood pressure 128/84, pulse 74, resp. rate 18, height 5' 11" (1.803 m), weight 78.9 kg (174 lb), SpO2 98 %. Body mass index is 24.27 kg/m². PHYSICAL EXAM:      General Appearance:   Alert, cooperative, no distress   HEENT:   Normocephalic, atraumatic, anicteric. Neck:  Supple, symmetrical, trachea midline   Lungs:   Clear to auscultation bilaterally; no rales, rhonchi or wheezing; respirations unlabored    Heart[de-identified]   Regular rate and rhythm; no murmur, rub, or gallop. Abdomen:   Soft, non-tender, non-distended; normal bowel sounds; no masses, no organomegaly    Genitalia:   Deferred    Rectal:   Deferred    Extremities:  No cyanosis, clubbing or edema    Pulses:  2+ and symmetric    Skin:  No jaundice, rashes, or lesions    Lymph nodes:  No palpable cervical lymphadenopathy        Lab Results:   No visits with results within 1 Day(s) from this visit. Latest known visit with results is:   Procedure visit on 05/30/2023   Component Date Value   • POST-VOID RESIDUAL VOLUM* 05/30/2023 112          Radiology Results:   No results found.

## 2023-09-21 NOTE — PROGRESS NOTES
9/22/2023      Chief Complaint   Patient presents with   • Follow-up     PT Urinated at home 45 min PTV     Assessment and Plan    1. BPH with urinary retention   - Now s/p TURP on 5/26/23  - Unable to provide uroflow due to urinating prior to visit. Bladder scan - 15 mL   - He is doing very well. No urinary complaints. 2. Renal cyst   - US kidney bladder from 7/3/23 - Enlarging 7.8 x 7.8 x 8.6 cm lobulated and minimally septated right renal cyst, this cyst has been characterized as Bosniak  2 cyst on the previous CT, surveillance can be continued with follow-up at 1 year    History of Present Illness  Jovani Gonzalez is a 46 y.o. male here for follow up evaluation of BPH with urinary retention. He previously was managed on Flomax. He developed urinary retention requiring Parnell catheterization in March of this year. He underwent work-up revealing lateral lobe coaptation of the prostate with significant intravesical protrusion. He is now status post TURP on 5/26/2023. TURP pathology was negative for malignancy. He is also followed for renal cyst which is benign appearing however recently did enlarge on ultrasound earlier this year. He is doing very well with excellent improvement in urination. Denies any bothersome issues at this time. PSA from 5/12/23 was 2.2. AUA SYMPTOM SCORE    Flowsheet Row Most Recent Value   AUA SYMPTOM SCORE    How often have you had a sensation of not emptying your bladder completely after you finished urinating? 1   How often have you had to urinate again less than two hours after you finished urinating? 1   How often have you found you stopped and started again several times when you urinate? 0   How often have you found it difficult to postpone urination? 2   How often have you had a weak urinary stream? 0   How often have you had to push or strain to begin urination?  0   How many times did you most typically get up to urinate from the time you went to bed at night until the time you got up in the morning? 1   Quality of Life: If you were to spend the rest of your life with your urinary condition just the way it is now, how would you feel about that? 0   AUA SYMPTOM SCORE 5              Review of Systems   Constitutional: Negative for chills and fever. Respiratory: Negative for shortness of breath. Cardiovascular: Negative for chest pain. Gastrointestinal: Negative for abdominal pain. Genitourinary: Negative for difficulty urinating, dysuria, flank pain, frequency, hematuria and urgency. Neurological: Negative for dizziness. Past Medical History  Past Medical History:   Diagnosis Date   • Hypertension    • Pneumonia        Past Social History  Past Surgical History:   Procedure Laterality Date   • JOINT REPLACEMENT      hip right   • NO PAST SURGERIES     • VT TRURL ELECTROSURG RESCJ PROSTATE BLEED COMPLETE N/A 5/26/2023    Procedure: TRANSURETHRAL RESECTION OF PROSTATE (TURP);   Surgeon: Blake Emanuel MD;  Location: [facility];  Service: Urology     Social History     Tobacco Use   Smoking Status Former   • Packs/day: 1.00   • Years: 20.00   • Total pack years: 20.00   • Types: Cigarettes   • Start date: 34   • Quit date: 10/1/2019   • Years since quitting: 3.9   • Passive exposure: Past   Smokeless Tobacco Never       Past Family History  Family History   Adopted: Yes   Problem Relation Age of Onset   • Coronary artery disease Father    • Cancer Father         Bladder cancer   • No Known Problems Mother    • Heart disease Son         Heart murmer-       Past Social history  Social History     Socioeconomic History   • Marital status: Legally      Spouse name: Not on file   • Number of children: Not on file   • Years of education: Not on file   • Highest education level: Not on file   Occupational History   • Not on file   Tobacco Use   • Smoking status: Former     Packs/day: 1.00     Years: 20.00     Total pack years: 20.00 Types: Cigarettes     Start date: 34     Quit date: 10/1/2019     Years since quitting: 3.9     Passive exposure: Past   • Smokeless tobacco: Never   Vaping Use   • Vaping Use: Never used   Substance and Sexual Activity   • Alcohol use: Not Currently     Comment: Social use   • Drug use: Yes     Frequency: 7.0 times per week     Types: Marijuana     Comment: Medical marijuana   • Sexual activity: Yes     Partners: Female     Birth control/protection: Condom Male   Other Topics Concern   • Not on file   Social History Narrative   • Not on file     Social Determinants of Health     Financial Resource Strain: Not on file   Food Insecurity: Not on file   Transportation Needs: Not on file   Physical Activity: Sufficiently Active (10/3/2022)    Exercise Vital Sign    • Days of Exercise per Week: 5 days    • Minutes of Exercise per Session: 60 min   Stress: Stress Concern Present (1/19/2021)    [facility]    • Feeling of Stress : Rather much   Social Connections: Not on file   Intimate Partner Violence: Not on file   Housing Stability: Not on file       Current Medications  Current Outpatient Medications   Medication Sig Dispense Refill   • lisinopril (ZESTRIL) 20 mg tablet Take 1 tablet (20 mg total) by mouth daily 90 tablet 3   • acetaminophen (TYLENOL) 325 mg tablet Take 650 mg by mouth every 6 (six) hours as needed for mild pain (Patient not taking: Reported on 9/21/2023)     • HYDROcodone-acetaminophen (NORCO) 5-325 mg per tablet  (Patient not taking: Reported on 9/21/2023)     • ibuprofen (MOTRIN) 200 mg tablet Take 200 mg by mouth every 6 (six) hours as needed for mild pain (Patient not taking: Reported on 9/21/2023)     • ibuprofen (MOTRIN) 600 mg tablet  (Patient not taking: Reported on 9/21/2023)     • multivitamin (THERAGRAN) TABS Take 1 tablet by mouth daily (Patient not taking: Reported on 9/22/2023)     • NON FORMULARY Medical Marijuana daily (Patient not taking: Reported on 9/21/2023)     • terbinafine (LamISIL) 1 % cream Apply topically 2 (two) times a day (Patient not taking: Reported on 9/21/2023) 15 g 2     No current facility-administered medications for this visit. Allergies  No Known Allergies      The following portions of the patient's history were reviewed and updated as appropriate: allergies, current medications, past medical history, past social history, past surgical history and problem list.      Vitals  Vitals:    09/22/23 0811   BP: 108/62   Pulse: 63   SpO2: 99%   Weight: 78.9 kg (174 lb)   Height: 5' 11" (1.803 m)           Physical Exam  Physical Exam  Constitutional:       Appearance: Normal appearance. HENT:      Head: Normocephalic and atraumatic. Right Ear: External ear normal.      Left Ear: External ear normal.      Nose: Nose normal.   Eyes:      General: No scleral icterus. Conjunctiva/sclera: Conjunctivae normal.   Cardiovascular:      Pulses: Normal pulses. Pulmonary:      Effort: Pulmonary effort is normal.   Musculoskeletal:         General: Normal range of motion. Cervical back: Normal range of motion. Neurological:      General: No focal deficit present. Mental Status: He is alert and oriented to person, place, and time. Psychiatric:         Mood and Affect: Mood normal.         Behavior: Behavior normal.         Thought Content:  Thought content normal.         Judgment: Judgment normal.           Results  Recent Results (from the past 1 hour(s))   POCT Measure PVR    Collection Time: 09/22/23  8:14 AM   Result Value Ref Range    POST-VOID RESIDUAL VOLUME, ML POC 15 mL   ]  Lab Results   Component Value Date    PSA 2.2 05/12/2023    PSA 1.6 05/03/2022     Lab Results   Component Value Date    CALCIUM 8.9 05/27/2023    K 3.8 05/27/2023    CO2 25 05/27/2023     05/27/2023    BUN 19 05/27/2023    CREATININE 0.98 05/27/2023     Lab Results   Component Value Date    WBC 10.49 (H) 05/27/2023    HGB 14.0 05/27/2023    HCT 41.0 05/27/2023    MCV 92 05/27/2023     05/27/2023           Orders  Orders Placed This Encounter   Procedures   • POCT Measure PVR       Shelly Montes

## 2023-09-22 ENCOUNTER — OFFICE VISIT (OUTPATIENT)
Dept: UROLOGY | Facility: CLINIC | Age: 48
End: 2023-09-22
Payer: COMMERCIAL

## 2023-09-22 VITALS
HEIGHT: 71 IN | BODY MASS INDEX: 24.36 KG/M2 | DIASTOLIC BLOOD PRESSURE: 62 MMHG | OXYGEN SATURATION: 99 % | WEIGHT: 174 LBS | SYSTOLIC BLOOD PRESSURE: 108 MMHG | HEART RATE: 63 BPM

## 2023-09-22 DIAGNOSIS — N40.1 BENIGN PROSTATIC HYPERPLASIA WITH URINARY RETENTION: Primary | ICD-10-CM

## 2023-09-22 DIAGNOSIS — Z12.5 SCREENING FOR PROSTATE CANCER: ICD-10-CM

## 2023-09-22 DIAGNOSIS — N28.1 COMPLEX RENAL CYST: ICD-10-CM

## 2023-09-22 DIAGNOSIS — R33.8 BENIGN PROSTATIC HYPERPLASIA WITH URINARY RETENTION: Primary | ICD-10-CM

## 2023-09-22 LAB — POST-VOID RESIDUAL VOLUME, ML POC: 15 ML

## 2023-09-22 PROCEDURE — 51798 US URINE CAPACITY MEASURE: CPT | Performed by: PHYSICIAN ASSISTANT

## 2023-09-22 PROCEDURE — 99213 OFFICE O/P EST LOW 20 MIN: CPT | Performed by: PHYSICIAN ASSISTANT

## 2023-12-12 ENCOUNTER — HOSPITAL ENCOUNTER (OUTPATIENT)
Dept: NON INVASIVE DIAGNOSTICS | Facility: CLINIC | Age: 48
Discharge: HOME | End: 2023-12-12
Payer: COMMERCIAL

## 2023-12-12 VITALS
DIASTOLIC BLOOD PRESSURE: 94 MMHG | HEART RATE: 51 BPM | HEIGHT: 71 IN | SYSTOLIC BLOOD PRESSURE: 202 MMHG | WEIGHT: 174 LBS | BODY MASS INDEX: 24.36 KG/M2 | OXYGEN SATURATION: 100 %

## 2023-12-12 DIAGNOSIS — I44.7 LEFT BUNDLE BRANCH BLOCK: ICD-10-CM

## 2023-12-12 LAB
NUC STRESS DIASTOLIC VOLUME INDEX: 171 ML/M2
NUC STRESS EJECTION FRACTION: 44 %
NUC STRESS SYSTOLIC VOLUME INDEX: 26 ML/M2
RATE PRESSURE PRODUCT: NORMAL
SL CV REST NUCLEAR ISOTOPE DOSE: 10.73 MCI
SL CV STRESS NUCLEAR ISOTOPE DOSE: 32.9 MCI
SL CV STRESS RECOVERY BP: NORMAL MMHG
SL CV STRESS RECOVERY HR: 75 BPM
SL CV STRESS RECOVERY O2 SAT: 99 %
STRESS ANGINA INDEX: 0
STRESS BASELINE BP: NORMAL MMHG
STRESS BASELINE HR: 51 BPM
STRESS O2 SAT REST: 100 %
STRESS PEAK HR: 89 BPM
STRESS POST O2 SAT PEAK: 99 %
STRESS POST PEAK BP: 216 MMHG
STRESS/REST PERFUSION RATIO: 1.08

## 2023-12-12 PROCEDURE — G1004 CDSM NDSC: HCPCS

## 2023-12-12 PROCEDURE — 93017 CV STRESS TEST TRACING ONLY: CPT

## 2023-12-12 PROCEDURE — 93016 CV STRESS TEST SUPVJ ONLY: CPT | Performed by: INTERNAL MEDICINE

## 2023-12-12 PROCEDURE — 78452 HT MUSCLE IMAGE SPECT MULT: CPT

## 2023-12-12 PROCEDURE — A9502 TC99M TETROFOSMIN: HCPCS

## 2023-12-12 PROCEDURE — 93018 CV STRESS TEST I&R ONLY: CPT | Performed by: INTERNAL MEDICINE

## 2023-12-12 PROCEDURE — 78452 HT MUSCLE IMAGE SPECT MULT: CPT | Performed by: INTERNAL MEDICINE

## 2023-12-12 RX ORDER — REGADENOSON 0.08 MG/ML
0.4 INJECTION, SOLUTION INTRAVENOUS ONCE
Status: COMPLETED | OUTPATIENT
Start: 2023-12-12 | End: 2023-12-12

## 2023-12-12 RX ADMIN — REGADENOSON 0.4 MG: 0.08 INJECTION, SOLUTION INTRAVENOUS at 09:23

## 2023-12-13 ENCOUNTER — TELEPHONE (OUTPATIENT)
Age: 48
End: 2023-12-13

## 2023-12-13 DIAGNOSIS — Q21.9 SEPTAL DEFECT, HEART: Primary | ICD-10-CM

## 2023-12-13 LAB
CHEST PAIN STATEMENT: NORMAL
MAX DIASTOLIC BP: 104 MMHG
MAX PREDICTED HEART RATE: 170 BPM
PROTOCOL NAME: NORMAL
REASON FOR TERMINATION: NORMAL
STRESS POST EXERCISE DUR MIN: 3 MIN
STRESS POST EXERCISE DUR SEC: 0 SEC
STRESS POST PEAK HR: 89 BPM
STRESS POST PEAK SYSTOLIC BP: 224 MMHG
TARGET HR FORMULA: NORMAL

## 2023-12-13 NOTE — TELEPHONE ENCOUNTER
----- Message from [address] sent at 12/13/2023  8:02 AM EST -----  Your stress test showed abnormal movement in the middle of your heart. It is recommended that we follow-up with an echo just to evaluate a little closer. This is an ultrasound of the heart.   I did order this, schedule when you can and we will go fr  om there

## 2023-12-15 ENCOUNTER — HOSPITAL ENCOUNTER (OUTPATIENT)
Dept: NON INVASIVE DIAGNOSTICS | Facility: CLINIC | Age: 48
Discharge: HOME | End: 2023-12-15
Payer: COMMERCIAL

## 2023-12-15 ENCOUNTER — TELEPHONE (OUTPATIENT)
Age: 48
End: 2023-12-15

## 2023-12-15 VITALS
HEIGHT: 71 IN | DIASTOLIC BLOOD PRESSURE: 62 MMHG | WEIGHT: 174 LBS | BODY MASS INDEX: 24.36 KG/M2 | HEART RATE: 50 BPM | SYSTOLIC BLOOD PRESSURE: 108 MMHG

## 2023-12-15 DIAGNOSIS — Q21.9 SEPTAL DEFECT, HEART: ICD-10-CM

## 2023-12-15 DIAGNOSIS — I44.7 LBBB (LEFT BUNDLE BRANCH BLOCK): Primary | ICD-10-CM

## 2023-12-15 LAB
AORTIC ROOT: 3.6 CM
APICAL FOUR CHAMBER EJECTION FRACTION: 67 %
ASCENDING AORTA: 2.7 CM
E WAVE DECELERATION TIME: 185 MS
E/A RATIO: 0.78
FRACTIONAL SHORTENING: 25 (ref 28–44)
INTERVENTRICULAR SEPTUM IN DIASTOLE (PARASTERNAL SHORT AXIS VIEW): 1.1 CM
INTERVENTRICULAR SEPTUM: 1.1 CM (ref 0.6–1.1)
LAAS-AP4: 18.1 CM2
LEFT ATRIUM SIZE: 4.4 CM
LEFT INTERNAL DIMENSION IN SYSTOLE: 3.6 CM (ref 2.1–4)
LEFT VENTRICULAR INTERNAL DIMENSION IN DIASTOLE: 4.8 CM (ref 3.5–6)
LEFT VENTRICULAR POSTERIOR WALL IN END DIASTOLE: 1.1 CM
LEFT VENTRICULAR STROKE VOLUME: 54 ML
LVSV (TEICH): 54 ML
MV E'TISSUE VEL-SEP: 6 CM/S
MV PEAK A VEL: 0.74 M/S
MV PEAK E VEL: 58 CM/S
MV STENOSIS PRESSURE HALF TIME: 54 MS
MV VALVE AREA P 1/2 METHOD: 4.07
RIGHT ATRIUM AREA SYSTOLE A4C: 15.9 CM2
RIGHT VENTRICLE ID DIMENSION: 3.7 CM
SL CV LV EF: 50
SL CV PED ECHO LEFT VENTRICLE DIASTOLIC VOLUME (MOD BIPLANE) 2D: 107 ML
SL CV PED ECHO LEFT VENTRICLE SYSTOLIC VOLUME (MOD BIPLANE) 2D: 53 ML
TRICUSPID ANNULAR PLANE SYSTOLIC EXCURSION: 2 CM

## 2023-12-15 PROCEDURE — 93306 TTE W/DOPPLER COMPLETE: CPT

## 2023-12-15 PROCEDURE — 93306 TTE W/DOPPLER COMPLETE: CPT | Performed by: INTERNAL MEDICINE

## 2023-12-15 NOTE — TELEPHONE ENCOUNTER
----- Message from JULIO Andres sent at 12/15/2023  3:21 PM EST -----  Please let him know that his echo if showing some electrical changes in his heart consistent with a left bundle branch block.  I did refer you to cardiology for further follow-up.  All the other structures of the heart were stable

## 2023-12-26 DIAGNOSIS — I10 BENIGN HYPERTENSION: ICD-10-CM

## 2023-12-26 RX ORDER — LISINOPRIL 20 MG/1
20 TABLET ORAL DAILY
Qty: 90 TABLET | Refills: 4 | Status: SHIPPED | OUTPATIENT
Start: 2023-12-26

## 2023-12-29 ENCOUNTER — APPOINTMENT (OUTPATIENT)
Dept: LAB | Facility: HOSPITAL | Age: 48
End: 2023-12-29
Payer: COMMERCIAL

## 2023-12-29 DIAGNOSIS — Z13.0 SCREENING FOR IRON DEFICIENCY ANEMIA: ICD-10-CM

## 2023-12-29 DIAGNOSIS — R73.01 IMPAIRED FASTING GLUCOSE: ICD-10-CM

## 2023-12-29 DIAGNOSIS — E78.2 MIXED HYPERLIPIDEMIA: ICD-10-CM

## 2023-12-29 DIAGNOSIS — Z13.29 SCREENING FOR THYROID DISORDER: ICD-10-CM

## 2023-12-29 LAB
ALBUMIN SERPL BCP-MCNC: 4.5 G/DL (ref 3.5–5)
ALP SERPL-CCNC: 55 U/L (ref 34–104)
ALT SERPL W P-5'-P-CCNC: 21 U/L (ref 7–52)
ANION GAP SERPL CALCULATED.3IONS-SCNC: 6 MMOL/L
AST SERPL W P-5'-P-CCNC: 20 U/L (ref 13–39)
BASOPHILS # BLD AUTO: 0.03 THOUSANDS/ÂΜL (ref 0–0.1)
BASOPHILS NFR BLD AUTO: 0 % (ref 0–1)
BILIRUB SERPL-MCNC: 0.55 MG/DL (ref 0.2–1)
BUN SERPL-MCNC: 22 MG/DL (ref 5–25)
CALCIUM SERPL-MCNC: 9.5 MG/DL (ref 8.4–10.2)
CHLORIDE SERPL-SCNC: 106 MMOL/L (ref 96–108)
CHOLEST SERPL-MCNC: 161 MG/DL
CO2 SERPL-SCNC: 26 MMOL/L (ref 21–32)
CREAT SERPL-MCNC: 0.97 MG/DL (ref 0.6–1.3)
EOSINOPHIL # BLD AUTO: 0.28 THOUSAND/ÂΜL (ref 0–0.61)
EOSINOPHIL NFR BLD AUTO: 3 % (ref 0–6)
ERYTHROCYTE [DISTWIDTH] IN BLOOD BY AUTOMATED COUNT: 12 % (ref 11.6–15.1)
EST. AVERAGE GLUCOSE BLD GHB EST-MCNC: 108 MG/DL
GFR SERPL CREATININE-BSD FRML MDRD: 90 ML/MIN/1.73SQ M
GLUCOSE P FAST SERPL-MCNC: 103 MG/DL (ref 65–99)
HBA1C MFR BLD: 5.4 %
HCT VFR BLD AUTO: 45.3 % (ref 36.5–49.3)
HDLC SERPL-MCNC: 43 MG/DL
HGB BLD-MCNC: 15.7 G/DL (ref 12–17)
IMM GRANULOCYTES # BLD AUTO: 0.02 THOUSAND/UL (ref 0–0.2)
IMM GRANULOCYTES NFR BLD AUTO: 0 % (ref 0–2)
LDLC SERPL CALC-MCNC: 101 MG/DL (ref 0–100)
LYMPHOCYTES # BLD AUTO: 1.87 THOUSANDS/ÂΜL (ref 0.6–4.47)
LYMPHOCYTES NFR BLD AUTO: 22 % (ref 14–44)
MCH RBC QN AUTO: 31.2 PG (ref 26.8–34.3)
MCHC RBC AUTO-ENTMCNC: 34.7 G/DL (ref 31.4–37.4)
MCV RBC AUTO: 90 FL (ref 82–98)
MONOCYTES # BLD AUTO: 0.61 THOUSAND/ÂΜL (ref 0.17–1.22)
MONOCYTES NFR BLD AUTO: 7 % (ref 4–12)
NEUTROPHILS # BLD AUTO: 5.58 THOUSANDS/ÂΜL (ref 1.85–7.62)
NEUTS SEG NFR BLD AUTO: 68 % (ref 43–75)
NONHDLC SERPL-MCNC: 118 MG/DL
NRBC BLD AUTO-RTO: 0 /100 WBCS
PLATELET # BLD AUTO: 175 THOUSANDS/UL (ref 149–390)
PMV BLD AUTO: 10.5 FL (ref 8.9–12.7)
POTASSIUM SERPL-SCNC: 4.3 MMOL/L (ref 3.5–5.3)
PROT SERPL-MCNC: 7.5 G/DL (ref 6.4–8.4)
RBC # BLD AUTO: 5.04 MILLION/UL (ref 3.88–5.62)
SODIUM SERPL-SCNC: 138 MMOL/L (ref 135–147)
TRIGL SERPL-MCNC: 83 MG/DL
TSH SERPL DL<=0.05 MIU/L-ACNC: 2.11 UIU/ML (ref 0.45–4.5)
WBC # BLD AUTO: 8.39 THOUSAND/UL (ref 4.31–10.16)

## 2023-12-29 PROCEDURE — 80053 COMPREHEN METABOLIC PANEL: CPT

## 2023-12-29 PROCEDURE — 85025 COMPLETE CBC W/AUTO DIFF WBC: CPT

## 2023-12-29 PROCEDURE — 80061 LIPID PANEL: CPT

## 2023-12-29 PROCEDURE — 84443 ASSAY THYROID STIM HORMONE: CPT

## 2023-12-29 PROCEDURE — 83036 HEMOGLOBIN GLYCOSYLATED A1C: CPT

## 2023-12-29 PROCEDURE — 36415 COLL VENOUS BLD VENIPUNCTURE: CPT

## 2024-01-03 ENCOUNTER — TELEPHONE (OUTPATIENT)
Age: 49
End: 2024-01-03

## 2024-01-03 NOTE — TELEPHONE ENCOUNTER
----- Message from Joanna Dillon PA-C sent at 1/3/2024  9:25 AM EST -----  He's due for his 6 month appt, can we get him on the schedule the next week or so to review these labs [facility] had ordered?

## 2024-01-10 ENCOUNTER — OFFICE VISIT (OUTPATIENT)
Age: 49
End: 2024-01-10
Payer: COMMERCIAL

## 2024-01-10 VITALS
SYSTOLIC BLOOD PRESSURE: 120 MMHG | HEIGHT: 71 IN | WEIGHT: 169.6 LBS | RESPIRATION RATE: 16 BRPM | BODY MASS INDEX: 23.74 KG/M2 | OXYGEN SATURATION: 98 % | DIASTOLIC BLOOD PRESSURE: 76 MMHG | HEART RATE: 66 BPM

## 2024-01-10 DIAGNOSIS — E78.2 MIXED HYPERLIPIDEMIA: ICD-10-CM

## 2024-01-10 DIAGNOSIS — Z13.0 SCREENING FOR DEFICIENCY ANEMIA: ICD-10-CM

## 2024-01-10 DIAGNOSIS — R53.83 OTHER FATIGUE: ICD-10-CM

## 2024-01-10 DIAGNOSIS — I10 BENIGN HYPERTENSION: ICD-10-CM

## 2024-01-10 DIAGNOSIS — R73.01 IMPAIRED FASTING GLUCOSE: ICD-10-CM

## 2024-01-10 DIAGNOSIS — G47.33 OSA (OBSTRUCTIVE SLEEP APNEA): ICD-10-CM

## 2024-01-10 DIAGNOSIS — Z13.220 SCREENING FOR LIPID DISORDERS: ICD-10-CM

## 2024-01-10 DIAGNOSIS — Z12.11 ENCOUNTER FOR SCREENING FOR MALIGNANT NEOPLASM OF COLON: Primary | ICD-10-CM

## 2024-01-10 PROCEDURE — 99214 OFFICE O/P EST MOD 30 MIN: CPT

## 2024-01-10 NOTE — PROGRESS NOTES
INTERNAL MEDICINE FOLLOW-UP VISIT  [facility] - [facility] INTERNAL MEDICINE [address]    NAME: Douglas Fermin  AGE: 48 y.o. SEX: male  : 1975     DATE: 1/10/2024     Assessment and Plan:   Encounter for screening for malignant neoplasm of colon  - Ambulatory Referral to Gastroenterology; Future    DEBORAH (obstructive sleep apnea)  Under control since weight loss.  No sleep issues    Mixed hyperlipidemia  Lipids are at goal.  Continue to limit carbohydrates such as bread, rice, pasta in your diet.  Stay physically active    Benign hypertension  Left bundle branch block incidental finding and preop EKG in May.  Overall feeling well.  Referral to cardiology was placed but not seen yet at this time.        No follow-ups on file.       Chief Complaint:     No chief complaint on file.     History of Present Illness:     Gi consult for colonoscopy was completed he is scheduled for colonoscopy coming up    Urology TURP postop.  Doing well still follows with urology    LBBB- ECHO was referred to cardiology but was not completed yet  Bladder cancer in family hx     Adopted does not know family hx     The following portions of the patient's history were reviewed and updated as appropriate: allergies, current medications, past family history, past medical history, past social history, past surgical history and problem list.     Review of Systems:     Review of Systems   Constitutional:  Negative for appetite change, chills, diaphoresis, fatigue, fever and unexpected weight change.   HENT:  Negative for postnasal drip and sneezing.    Eyes:  Negative for visual disturbance.   Respiratory:  Negative for chest tightness and shortness of breath.    Cardiovascular:  Negative for chest pain, palpitations and leg swelling.   Gastrointestinal:  Negative for abdominal pain and blood in stool.   Endocrine: Negative for cold intolerance, heat intolerance, polydipsia, polyphagia and polyuria.   Genitourinary:   "Negative for difficulty urinating, dysuria, frequency and urgency.   Musculoskeletal:  Negative for arthralgias and myalgias.   Skin:  Negative for rash and wound.   Neurological:  Negative for dizziness, weakness, light-headedness and headaches.   Hematological:  Negative for adenopathy.   Psychiatric/Behavioral:  Negative for confusion, dysphoric mood and sleep disturbance. The patient is not nervous/anxious.         Past Medical History:     Past Medical History:   Diagnosis Date   • Anxiety 4/04   • Hypertension    • Otitis media 6/18   • Pneumonia         Current Medications:     Current Outpatient Medications:   •  acetaminophen (TYLENOL) 325 mg tablet, Take 650 mg by mouth every 6 (six) hours as needed for mild pain, Disp: , Rfl:   •  HYDROcodone-acetaminophen (NORCO) 5-325 mg per tablet, , Disp: , Rfl:   •  ibuprofen (MOTRIN) 200 mg tablet, Take 200 mg by mouth every 6 (six) hours as needed for mild pain, Disp: , Rfl:   •  ibuprofen (MOTRIN) 600 mg tablet, , Disp: , Rfl:   •  lisinopril (ZESTRIL) 20 mg tablet, TAKE 1 TABLET BY MOUTH EVERY DAY, Disp: 90 tablet, Rfl: 4  •  multivitamin (THERAGRAN) TABS, Take 1 tablet by mouth daily, Disp: , Rfl:   •  NON FORMULARY, Medical Marijuana daily, Disp: , Rfl:   •  terbinafine (LamISIL) 1 % cream, Apply topically 2 (two) times a day, Disp: 15 g, Rfl: 2     Allergies:   No Known Allergies     Physical Exam:     /76 (BP Location: Left arm, Patient Position: Sitting, Cuff Size: Large)   Pulse 66   Resp 16   Ht 5' 11\" (1.803 m)   Wt 76.9 kg (169 lb 9.6 oz)   SpO2 98%   BMI 23.65 kg/m²     Physical Exam  Constitutional:       Appearance: He is well-developed.   HENT:      Head: Normocephalic and atraumatic.   Eyes:      Conjunctiva/sclera: Conjunctivae normal.      Pupils: Pupils are equal, round, and reactive to light.   Cardiovascular:      Rate and Rhythm: Normal rate and regular rhythm.      Heart sounds: Normal heart sounds.   Pulmonary:      Effort: " Pulmonary effort is normal.      Breath sounds: Normal breath sounds.   Abdominal:      General: Bowel sounds are normal.      Palpations: Abdomen is soft.   Musculoskeletal:         General: Normal range of motion.      Cervical back: Normal range of motion.   Skin:     General: Skin is warm and dry.   Neurological:      Mental Status: He is alert and oriented to person, place, and time.           Data:     Laboratory Results: I have personally reviewed the pertinent laboratory results/reports   Radiology/Other Diagnostic Testing Results: I have personally reviewed pertinent reports.      JULIO Andres  [facility] INTERNAL MEDICINE [address]

## 2024-01-10 NOTE — PATIENT INSTRUCTIONS
Encounter for screening for malignant neoplasm of colon  - Ambulatory Referral to Gastroenterology; Future    DEBORAH (obstructive sleep apnea)  Under control since weight loss.  No sleep issues    Mixed hyperlipidemia  Lipids are at goal.  Continue to limit carbohydrates such as bread, rice, pasta in your diet.  Stay physically active    Benign hypertension  Left bundle branch block incidental finding and preop EKG in May.  Overall feeling well.  Referral to cardiology was placed but not seen yet at this time.

## 2024-04-05 ENCOUNTER — APPOINTMENT (OUTPATIENT)
Dept: RADIOLOGY | Facility: CLINIC | Age: 49
End: 2024-04-05
Payer: COMMERCIAL

## 2024-04-05 ENCOUNTER — OFFICE VISIT (OUTPATIENT)
Dept: OBGYN CLINIC | Facility: CLINIC | Age: 49
End: 2024-04-05
Payer: COMMERCIAL

## 2024-04-05 VITALS
HEIGHT: 71 IN | WEIGHT: 174 LBS | HEART RATE: 49 BPM | BODY MASS INDEX: 24.36 KG/M2 | SYSTOLIC BLOOD PRESSURE: 178 MMHG | DIASTOLIC BLOOD PRESSURE: 84 MMHG

## 2024-04-05 DIAGNOSIS — M25.511 ACUTE PAIN OF RIGHT SHOULDER: ICD-10-CM

## 2024-04-05 DIAGNOSIS — M75.81 TENDINITIS OF RIGHT ROTATOR CUFF: ICD-10-CM

## 2024-04-05 DIAGNOSIS — M19.011 ARTHRITIS OF RIGHT ACROMIOCLAVICULAR JOINT: Primary | ICD-10-CM

## 2024-04-05 PROCEDURE — 99203 OFFICE O/P NEW LOW 30 MIN: CPT | Performed by: FAMILY MEDICINE

## 2024-04-05 PROCEDURE — 73030 X-RAY EXAM OF SHOULDER: CPT

## 2024-04-05 NOTE — PATIENT INSTRUCTIONS
Over-the-counter vitamins:    - Turmeric vitamin at least 1000 mg daily    - Tart cherry vitamin at least 1000 mg daily     - Glucosamine-chondrointin 2- 3 times daily    Over-the-counter topical diclofenac gel/Voltaren  - 3 times daily as needed    Aleve twice daily with food for 5 days

## 2024-04-05 NOTE — PROGRESS NOTES
Assessment/Plan:  Assessment/Plan   Diagnoses and all orders for this visit:    Arthritis of right acromioclavicular joint  -     XR shoulder 2+ vw right; Future    Tendinitis of right rotator cuff        48-year-old right-hand-dominant male [employer]  with right shoulder pain 5 days duration.  Discussed with patient physical exam, radiographs, impression, and plan.  X-rays right shoulder unremarkable for acute osseous abnormality.  There are mild AC joint degenerative changes.  Physical exam cervical spine unremarkable for midline or paraspinal tenderness.  He has intact range of motion of the cervical spine.  Axial load and Spurling's are unremarkable.  Right shoulder noted for tenderness at the lateral aspect and upper trapezius.  He has range of motion forward flexion to 170 degrees, abduction 150 degrees, and internal rotation lumbar spine however done slowly.  There is mild pain with empty can and with Lennon.  He has normal sensation, bicep reflex, and radial pulse both upper extremities.  Clinical impression is that he may have symptoms from aggravated AC joint arthritis and from rotator cuff tendinitis.  Symptoms started improving so advised there is no need for invasive management.  I discussed treatment regimen of supplements and anti-inflammatory.  He is to take turmeric vitamin at least 1000 mg daily, tart cherry vitamin at least 1000 mg daily, glucosamine 2-3 times daily.  He may apply topical diclofenac gel 3 times daily as needed.  He is to take 1 tablet of Aleve twice daily with food for 5 days.  He will follow-up with me as needed.      Subjective:   Patient ID: Douglas Fermin is a 48 y.o. male.  Chief Complaint   Patient presents with    Right Shoulder - Pain        48-year-old right-hand-dominant male [employer]  presents for evaluation right shoulder pain 5 days duration.  He denies specific trauma but reports onset of symptoms after doing a lot of push-ups and  "exercising.  Pain described as sudden in onset, generalized to the shoulder worse at the superior and lateral aspects, radiating distally along the lateral aspect upper arm, worse moving the arm, and associated limited range of motion, and improved resting.  He started treating with icing and doing range of motion exercises as instructed by his chiropractor.  He states he applied Biofreeze last night and as of this morning has been feeling much better.    Shoulder Pain  This is a new problem. The current episode started in the past 7 days. The problem occurs daily. The problem has been rapidly improving. Associated symptoms include arthralgias. Pertinent negatives include no joint swelling, numbness or weakness. He has tried rest and ice (Topical anesthetic, home exercise) for the symptoms. The treatment provided significant relief.           The following portions of the patient's history were reviewed and updated as appropriate: He  has a past medical history of Anxiety (4/04), Hypertension, Osteoarthritis, Otitis media (6/18), and Pneumonia.  He has No Known Allergies..    Review of Systems   Musculoskeletal:  Positive for arthralgias. Negative for joint swelling.   Neurological:  Negative for weakness and numbness.       Objective:  Vitals:    04/05/24 1017   BP: (!) 178/84   Pulse: (!) 49   Weight: 78.9 kg (174 lb)   Height: 5' 11\" (1.803 m)      Back Exam     Comments:    Cervical spine  -No tenderness  - Normal range of motion  - Negative axial load  - Negative Spurling's  - Normal strength and sensation both upper extremities      Right Hand Exam     Muscle Strength   Wrist extension: 5/5   Wrist flexion: 5/5   : 5/5     Other   Sensation: normal  Pulse: present      Left Hand Exam     Muscle Strength   Wrist extension: 5/5   Wrist flexion: 5/5   :  5/5     Other   Sensation: normal  Pulse: present      Right Elbow Exam     Tenderness   The patient is experiencing no tenderness.     Range of Motion "   The patient has normal right elbow ROM.    Muscle Strength   The patient has normal right elbow strength (5/5 flexion and extension).    Other   Sensation: normal      Left Elbow Exam     Other   Sensation: normal      Right Shoulder Exam     Tenderness   Right shoulder tenderness location: Upper trapezius, lateral.    Range of Motion   Active abduction:  150   Forward flexion:  170   Internal rotation 0 degrees:  Lumbar     Muscle Strength   Abduction: 5/5   Internal rotation: 5/5   External rotation: 5/5   Supraspinatus: 5/5     Tests   Lennon test: positive (mild)    Other   Sensation: normal    Comments:  Mild pain with empty can      Left Shoulder Exam     Other   Sensation: normal           Strength/Myotome Testing     Left Wrist/Hand   Wrist extension: 5  Wrist flexion: 5    Right Wrist/Hand   Wrist extension: 5  Wrist flexion: 5      Physical Exam  Vitals and nursing note reviewed.   Constitutional:       Appearance: Normal appearance. He is well-developed. He is not ill-appearing or diaphoretic.   HENT:      Head: Normocephalic and atraumatic.      Right Ear: External ear normal.      Left Ear: External ear normal.   Eyes:      Conjunctiva/sclera: Conjunctivae normal.   Neck:      Trachea: No tracheal deviation.   Cardiovascular:      Comments: Bradycardic  Pulmonary:      Effort: Pulmonary effort is normal. No respiratory distress.   Abdominal:      General: There is no distension.   Musculoskeletal:         General: Tenderness present. No swelling.   Skin:     General: Skin is warm and dry.      Coloration: Skin is not jaundiced or pale.   Neurological:      Mental Status: He is alert and oriented to person, place, and time.   Psychiatric:         Mood and Affect: Mood normal.         Behavior: Behavior normal.         Thought Content: Thought content normal.         Judgment: Judgment normal.         I have personally reviewed pertinent films in PACS and my interpretation is  .  X-rays right shoulder  unremarkable for acute osseous abnormality.  There are mild AC joint degenerative changes.

## 2024-07-01 ENCOUNTER — HOSPITAL ENCOUNTER (OUTPATIENT)
Dept: ULTRASOUND IMAGING | Facility: HOSPITAL | Age: 49
Discharge: HOME | End: 2024-07-01
Payer: COMMERCIAL

## 2024-07-01 DIAGNOSIS — N28.1 COMPLEX RENAL CYST: ICD-10-CM

## 2024-07-01 PROCEDURE — 76775 US EXAM ABDO BACK WALL LIM: CPT

## 2024-07-12 ENCOUNTER — OFFICE VISIT (OUTPATIENT)
Dept: UROLOGY | Facility: CLINIC | Age: 49
End: 2024-07-12
Payer: COMMERCIAL

## 2024-07-12 VITALS
HEART RATE: 54 BPM | TEMPERATURE: 98 F | HEIGHT: 71 IN | RESPIRATION RATE: 18 BRPM | OXYGEN SATURATION: 99 % | SYSTOLIC BLOOD PRESSURE: 140 MMHG | DIASTOLIC BLOOD PRESSURE: 86 MMHG | BODY MASS INDEX: 23.46 KG/M2 | WEIGHT: 167.6 LBS

## 2024-07-12 DIAGNOSIS — R33.8 BENIGN PROSTATIC HYPERPLASIA WITH URINARY RETENTION: Primary | ICD-10-CM

## 2024-07-12 DIAGNOSIS — N28.1 COMPLEX RENAL CYST: ICD-10-CM

## 2024-07-12 DIAGNOSIS — N40.1 BENIGN PROSTATIC HYPERPLASIA WITH URINARY RETENTION: Primary | ICD-10-CM

## 2024-07-12 LAB — POST-VOID RESIDUAL VOLUME, ML POC: 0 ML

## 2024-07-12 PROCEDURE — 99213 OFFICE O/P EST LOW 20 MIN: CPT | Performed by: PHYSICIAN ASSISTANT

## 2024-07-12 PROCEDURE — 51798 US URINE CAPACITY MEASURE: CPT | Performed by: PHYSICIAN ASSISTANT

## 2024-07-12 RX ORDER — DOXYCYCLINE HYCLATE 100 MG/1
CAPSULE ORAL
COMMUNITY
Start: 2024-06-29

## 2024-07-12 NOTE — PROGRESS NOTES
7/12/2024      Chief Complaint   Patient presents with    Follow-up     Assessment and Plan    1. BPH with urinary retention   - s/p TURP on 5/26/23  - Doing very well  - Uroflow - Qmax 6 ml/s, Voided volume 90 cc. PVR 0 mL      2. Right renal cyst   - US kidney bladder from 7/1/24 - Right kidney cyst currently measures 8.2 x 9.4 x 8.7 cm, earlier 7.8 x 7.9 x 8.6 cm. In 2019 this measured 6.7 x 6.5 x 7.0 cm. This is mildly lobulated as before, with overall benign and otherwise simple appearance     3. Prostate cancer screening  - Obtain updated PSA as ordered    - Follow up in 1 year with repeat US to ensure stability of renal cyst given slight enlargement.     History of Present Illness  Douglas Fermin is a 49 y.o. male here for follow up evaluation of  BPH and renal cyst.     He previously was managed on Flomax. He developed urinary retention requiring P[facility]ell catheterization in March of 2023. He underwent work-up revealing lateral lobe coaptation of the prostate with significant intravesical protrusion. He is now status post TURP on 5/26/2023. TURP pathology was negative for malignancy. He is also followed for renal cyst which is benign appearing. Presents for imaging review today. PSA from 5/12/23 was 2.2.  He continues to do very well from a urinary standpoint. No complaints currently.       AUA SYMPTOM SCORE      Flowsheet Row Most Recent Value   AUA SYMPTOM SCORE    How often have you had a sensation of not emptying your bladder completely after you finished urinating? 1   How often have you had to urinate again less than two hours after you finished urinating? 0   How often have you found you stopped and started again several times when you urinate? 0   How often have you found it difficult to postpone urination? 1   How often have you had a weak urinary stream? 0   How often have you had to push or strain to begin urination? 0   How many times did you most typically get up to urinate from the time you went  to bed at night until the time you got up in the morning? 1   Quality of Life: If you were to spend the rest of your life with your urinary condition just the way it is now, how would you feel about that? 1   AUA SYMPTOM SCORE 3            Review of Systems   Constitutional:  Negative for chills and fever.   Respiratory:  Negative for shortness of breath.    Cardiovascular:  Negative for chest pain.   Gastrointestinal:  Negative for abdominal pain.   Genitourinary:  Negative for difficulty urinating, dysuria, flank pain, frequency, hematuria and urgency.   Neurological:  Negative for dizziness.                  Past Medical History  Past Medical History:   Diagnosis Date    Anxiety     Hypertension     Osteoarthritis     Rt hip replacement    Otitis media     Pneumonia        Past Social History  Past Surgical History:   Procedure Laterality Date    HIP SURGERY      JOINT REPLACEMENT      hip right    NO PAST SURGERIES      ID TRURL ELECTROSURG RESCJ PROSTATE BLEED COMPLETE N/A 2023    Procedure: TRANSURETHRAL RESECTION OF PROSTATE (TURP);  Surgeon: Angel Luis Hardin MD;  Location: [facility];  Service: Urology     Social History     Tobacco Use   Smoking Status Former    Current packs/day: 0.00    Average packs/day: 1 pack/day for 20.7 years (20.7 ttl pk-yrs)    Types: Cigarettes    Start date: 1999    Quit date: 10/1/2019    Years since quittin.7    Passive exposure: Past   Smokeless Tobacco Never       Past Family History  Family History   Adopted: Yes   Problem Relation Age of Onset    Coronary artery disease Father     Cancer Father         Bladder cancer    No Known Problems Mother     Heart disease Son         Heart murmer-    Heart disease Maternal Grandmother        Past Social history  Social History     Socioeconomic History    Marital status: Single     Spouse name: Not on file    Number of children: Not on file    Years of education: Not on file    Highest education  level: Not on file   Occupational History    Not on file   Tobacco Use    Smoking status: Former     Current packs/day: 0.00     Average packs/day: 1 pack/day for 20.7 years (20.7 ttl pk-yrs)     Types: Cigarettes     Start date: 1999     Quit date: 10/1/2019     Years since quittin.7     Passive exposure: Past    Smokeless tobacco: Never   Vaping Use    Vaping status: Never Used   Substance and Sexual Activity    Alcohol use: Not Currently     Comment: Social use    Drug use: Yes     Frequency: 7.0 times per week     Types: Marijuana     Comment: Medical marijuana    Sexual activity: Yes     Partners: Female     Birth control/protection: Condom Male   Other Topics Concern    Not on file   Social History Narrative    Not on file     Social Determinants of Health     Financial Resource Strain: Not on file   Food Insecurity: Not on file   Transportation Needs: Not on file   Physical Activity: Sufficiently Active (10/3/2022)    Exercise Vital Sign     Days of Exercise per Week: 5 days     Minutes of Exercise per Session: 60 min   Stress: Stress Concern Present (2021)    [employer] - Occupational Stress Questionnaire     Feeling of Stress : Rather much   Social Connections: Not on file   Intimate Partner Violence: Not on file   Housing Stability: Not on file       Current Medications  Current Outpatient Medications   Medication Sig Dispense Refill    acetaminophen (TYLENOL) 325 mg tablet Take 650 mg by mouth every 6 (six) hours as needed for mild pain      doxycycline hyclate (VIBRAMYCIN) 100 mg capsule       lisinopril (ZESTRIL) 20 mg tablet TAKE 1 TABLET BY MOUTH EVERY DAY 90 tablet 4    multivitamin (THERAGRAN) TABS Take 1 tablet by mouth daily      NON FORMULARY Medical Marijuana daily      HYDROcodone-acetaminophen (NORCO) 5-325 mg per tablet  (Patient not taking: Reported on 2024)      ibuprofen (MOTRIN) 200 mg tablet Take 200 mg by mouth every 6 (six) hours as needed  "for mild pain      ibuprofen (MOTRIN) 600 mg tablet       terbinafine (LamISIL) 1 % cream Apply topically 2 (two) times a day (Patient not taking: Reported on 4/5/2024) 15 g 2     No current facility-administered medications for this visit.       Allergies  No Known Allergies      The following portions of the patient's history were reviewed and updated as appropriate: allergies, current medications, past medical history, past social history, past surgical history and problem list.      Vitals  Vitals:    07/12/24 1105   BP: 140/86   BP Location: Left arm   Patient Position: Sitting   Cuff Size: Standard   Pulse: (!) 54   Resp: 18   Temp: 98 °F (36.7 °C)   TempSrc: Tympanic   SpO2: 99%   Weight: 76 kg (167 lb 9.6 oz)   Height: 5' 11\" (1.803 m)           Physical Exam  Physical Exam  Constitutional:       Appearance: Normal appearance.   HENT:      Head: Normocephalic and atraumatic.      Right Ear: External ear normal.      Left Ear: External ear normal.      Nose: Nose normal.   Eyes:      General: No scleral icterus.     Conjunctiva/sclera: Conjunctivae normal.   Cardiovascular:      Pulses: Normal pulses.   Pulmonary:      Effort: Pulmonary effort is normal.   Musculoskeletal:         General: Normal range of motion.      Cervical back: Normal range of motion.   Neurological:      General: No focal deficit present.      Mental Status: He is alert and oriented to person, place, and time.   Psychiatric:         Mood and Affect: Mood normal.         Behavior: Behavior normal.         Thought Content: Thought content normal.         Judgment: Judgment normal.           Results  Recent Results (from the past 1 hour(s))   POCT Measure PVR    Collection Time: 07/12/24 11:15 AM   Result Value Ref Range    POST-VOID RESIDUAL VOLUME, ML POC 0 mL   ]  Lab Results   Component Value Date    PSA 2.2 05/12/2023    PSA 1.6 05/03/2022     Lab Results   Component Value Date    CALCIUM 9.5 12/29/2023    K 4.3 12/29/2023    CO2 26 " 12/29/2023     12/29/2023    BUN 22 12/29/2023    CREATININE 0.97 12/29/2023     Lab Results   Component Value Date    WBC 8.39 12/29/2023    HGB 15.7 12/29/2023    HCT 45.3 12/29/2023    MCV 90 12/29/2023     12/29/2023           Orders  Orders Placed This Encounter   Procedures    POCT Measure PVR       Jackie Garcia

## 2024-07-23 ENCOUNTER — TELEPHONE (OUTPATIENT)
Age: 49
End: 2024-07-23

## 2024-07-23 NOTE — TELEPHONE ENCOUNTER
07/23/24  Screened by: Maria Alejandra Mcdonnell    Referring Provider     Pre- Screening:     There is no height or weight on file to calculate BMI.  Has patient been referred for a routine screening Colonoscopy? yes  Is the patient between 45-75 years old? yes      Previous Colonoscopy no   If yes:    Date:     Facility:     Reason:       Does the patient want to see a Gastroenterologist prior to their procedure OR are they having any GI symptoms? no    Has the patient been hospitalized or had abdominal surgery in the past 6 months? no    Does the patient use supplemental oxygen? no    Does the patient take Coumadin, Lovenox, Plavix, Elliquis, Xarelto, or other blood thinning medication? no    Has the patient had a stroke, cardiac event, or stent placed in the past year? no    If patient is between 45yrs - 49yrs, please advise patient that we will have to confirm benefits & coverage with their insurance company for a routine screening colonoscopy.

## 2024-07-23 NOTE — TELEPHONE ENCOUNTER
Scheduled date of colonoscopy (as of today): 8/13/24  Physician performing colonoscopy: DAGMAR  Location of colonoscopy: [facility]  Bowel prep reviewed with patient: YOBANI / JUANPABLO  Instructions reviewed with patient by: STELLA  Clearances: N/A

## 2024-08-13 ENCOUNTER — ANESTHESIA EVENT (OUTPATIENT)
Dept: GASTROENTEROLOGY | Facility: HOSPITAL | Age: 49
End: 2024-08-13

## 2024-08-13 ENCOUNTER — ANESTHESIA (OUTPATIENT)
Dept: GASTROENTEROLOGY | Facility: HOSPITAL | Age: 49
End: 2024-08-13

## 2024-08-13 ENCOUNTER — HOSPITAL ENCOUNTER (OUTPATIENT)
Dept: GASTROENTEROLOGY | Facility: HOSPITAL | Age: 49
Setting detail: OUTPATIENT SURGERY
Discharge: HOME | End: 2024-08-13
Attending: INTERNAL MEDICINE | Admitting: INTERNAL MEDICINE
Payer: COMMERCIAL

## 2024-08-13 VITALS
SYSTOLIC BLOOD PRESSURE: 158 MMHG | DIASTOLIC BLOOD PRESSURE: 72 MMHG | HEART RATE: 44 BPM | OXYGEN SATURATION: 100 % | WEIGHT: 166.45 LBS | RESPIRATION RATE: 20 BRPM | TEMPERATURE: 98.2 F | BODY MASS INDEX: 23.3 KG/M2 | HEIGHT: 71 IN

## 2024-08-13 DIAGNOSIS — Z12.11 SCREENING FOR COLON CANCER: ICD-10-CM

## 2024-08-13 PROCEDURE — 88305 TISSUE EXAM BY PATHOLOGIST: CPT | Performed by: PATHOLOGY

## 2024-08-13 PROCEDURE — 45385 COLONOSCOPY W/LESION REMOVAL: CPT | Performed by: INTERNAL MEDICINE

## 2024-08-13 RX ORDER — PROPOFOL 10 MG/ML
INJECTION, EMULSION INTRAVENOUS AS NEEDED
Status: DISCONTINUED | OUTPATIENT
Start: 2024-08-13 | End: 2024-08-13

## 2024-08-13 RX ORDER — LIDOCAINE HYDROCHLORIDE 10 MG/ML
INJECTION, SOLUTION EPIDURAL; INFILTRATION; INTRACAUDAL; PERINEURAL AS NEEDED
Status: DISCONTINUED | OUTPATIENT
Start: 2024-08-13 | End: 2024-08-13

## 2024-08-13 RX ORDER — SODIUM CHLORIDE, SODIUM LACTATE, POTASSIUM CHLORIDE, CALCIUM CHLORIDE 600; 310; 30; 20 MG/100ML; MG/100ML; MG/100ML; MG/100ML
INJECTION, SOLUTION INTRAVENOUS CONTINUOUS PRN
Status: DISCONTINUED | OUTPATIENT
Start: 2024-08-13 | End: 2024-08-13

## 2024-08-13 RX ADMIN — PROPOFOL 20 MG: 10 INJECTION, EMULSION INTRAVENOUS at 08:35

## 2024-08-13 RX ADMIN — PROPOFOL 20 MG: 10 INJECTION, EMULSION INTRAVENOUS at 08:52

## 2024-08-13 RX ADMIN — PROPOFOL 20 MG: 10 INJECTION, EMULSION INTRAVENOUS at 08:45

## 2024-08-13 RX ADMIN — PROPOFOL 140 MG: 10 INJECTION, EMULSION INTRAVENOUS at 08:29

## 2024-08-13 RX ADMIN — PROPOFOL 20 MG: 10 INJECTION, EMULSION INTRAVENOUS at 08:43

## 2024-08-13 RX ADMIN — PROPOFOL 20 MG: 10 INJECTION, EMULSION INTRAVENOUS at 08:31

## 2024-08-13 RX ADMIN — PROPOFOL 20 MG: 10 INJECTION, EMULSION INTRAVENOUS at 08:37

## 2024-08-13 RX ADMIN — LIDOCAINE HYDROCHLORIDE 50 MG: 10 INJECTION, SOLUTION EPIDURAL; INFILTRATION; INTRACAUDAL; PERINEURAL at 08:29

## 2024-08-13 RX ADMIN — PROPOFOL 20 MG: 10 INJECTION, EMULSION INTRAVENOUS at 08:48

## 2024-08-13 RX ADMIN — SODIUM CHLORIDE, SODIUM LACTATE, POTASSIUM CHLORIDE, AND CALCIUM CHLORIDE: .6; .31; .03; .02 INJECTION, SOLUTION INTRAVENOUS at 08:20

## 2024-08-13 RX ADMIN — PROPOFOL 20 MG: 10 INJECTION, EMULSION INTRAVENOUS at 08:33

## 2024-08-13 RX ADMIN — PROPOFOL 20 MG: 10 INJECTION, EMULSION INTRAVENOUS at 08:39

## 2024-08-13 NOTE — H&P
History and Physical - [facility]  Douglas Ferimn 49 y.o. male MRN: [id number]                  HPI: Douglas Fermin is a 49 y.o. year old male who presents for colonoscopy for colon cancer screening.      REVIEW OF SYSTEMS: Per the HPI, and otherwise unremarkable.    Historical Information   Past Medical History:   Diagnosis Date    Anxiety     Hypertension     Osteoarthritis     Rt hip replacement    Otitis media     Pneumonia      Past Surgical History:   Procedure Laterality Date    HIP SURGERY      JOINT REPLACEMENT      hip right    NO PAST SURGERIES      ND TRURL ELECTROSURG RESCJ PROSTATE BLEED COMPLETE N/A 2023    Procedure: TRANSURETHRAL RESECTION OF PROSTATE (TURP);  Surgeon: Angel Luis Hardin MD;  Location[facility];  Service: Urology     Social History   Social History     Substance and Sexual Activity   Alcohol Use Not Currently    Comment: Social use     Social History     Substance and Sexual Activity   Drug Use Yes    Frequency: 7.0 times per week    Types: Marijuana    Comment: Medical marijuana     Social History     Tobacco Use   Smoking Status Former    Current packs/day: 0.00    Average packs/day: 1 pack/day for 20.7 years (20.7 ttl pk-yrs)    Types: Cigarettes    Start date: 1999    Quit date: 10/1/2019    Years since quittin.8    Passive exposure: Past   Smokeless Tobacco Never     Family History   Adopted: Yes   Problem Relation Age of Onset    Coronary artery disease Father     Cancer Father         Bladder cancer    No Known Problems Mother     Heart disease Son         Heart murmer-    Heart disease Maternal Grandmother        Meds/Allergies       Current Outpatient Medications:     lisinopril (ZESTRIL) 20 mg tablet    multivitamin (THERAGRAN) TABS    acetaminophen (TYLENOL) 325 mg tablet    doxycycline hyclate (VIBRAMYCIN) 100 mg capsule    HYDROcodone-acetaminophen (NORCO) 5-325 mg per tablet    ibuprofen (MOTRIN) 200 mg tablet     "ibuprofen (MOTRIN) 600 mg tablet    NON FORMULARY    terbinafine (LamISIL) 1 % cream    No Known Allergies    Objective     BP (!) 177/86   Pulse 64   Temp 97.6 °F (36.4 °C) (Temporal)   Resp 16   Ht 5' 11\" (1.803 m)   Wt 75.5 kg (166 lb 7.2 oz)   SpO2 100%   BMI 23.21 kg/m²       PHYSICAL EXAM    Gen: NAD  Head: NCAT  CV: RRR  CHEST: Clear  ABD: soft, NT/ND  EXT: no edema      ASSESSMENT/PLAN:   Douglas Fermin is a 49 y.o. year old male who presents for colonoscopy for colon cancer screening. The patient is stable and optimized for the procedure, we reviewed risk and benefits. Risk include but not limited to infection, bleeding, perforation and missing a lesion.          "

## 2024-08-13 NOTE — ANESTHESIA POSTPROCEDURE EVALUATION
Post-Op Assessment Note    CV Status:  Stable    Pain management: adequate       Mental Status:  Alert and awake   Hydration Status:  Euvolemic   PONV Controlled:  Controlled   Airway Patency:  Patent     Post Op Vitals Reviewed: Yes    No anethesia notable event occurred.    Staff: CRNA               BP   122/66   Temp      Pulse 50   Resp 18   SpO2 98% RA

## 2024-08-13 NOTE — ANESTHESIA PREPROCEDURE EVALUATION
Procedure:  COLONOSCOPY    Relevant Problems   CARDIO   (+) Benign hypertension   (+) Mixed hyperlipidemia      /RENAL   (+) Complex renal cyst      PULMONARY   (+) DEBORAH (obstructive sleep apnea)        Physical Exam    Airway    Mallampati score: II  TM Distance: >3 FB  Neck ROM: full     Dental        Cardiovascular      Pulmonary      Other Findings        Anesthesia Plan  ASA Score- 2     Anesthesia Type- IV sedation with anesthesia with ASA Monitors.         Additional Monitors:     Airway Plan:     Comment: I have seen the patient and reviewed the history.  Patient to receive IV sedation with full ASA monitors.  Risks discussed with the patient, consent signed.  .       Plan Factors-Exercise tolerance (METS): >4 METS.    Chart reviewed.    Patient summary reviewed.                  Induction- intravenous.    Postoperative Plan-         Informed Consent- Anesthetic plan and risks discussed with patient.  I personally reviewed this patient with the CRNA. Discussed and agreed on the Anesthesia Plan with the CRNA..

## 2024-08-16 ENCOUNTER — OFFICE VISIT (OUTPATIENT)
Age: 49
End: 2024-08-16
Payer: COMMERCIAL

## 2024-08-16 VITALS
BODY MASS INDEX: 23.66 KG/M2 | SYSTOLIC BLOOD PRESSURE: 180 MMHG | HEART RATE: 68 BPM | RESPIRATION RATE: 18 BRPM | DIASTOLIC BLOOD PRESSURE: 82 MMHG | OXYGEN SATURATION: 98 % | WEIGHT: 169 LBS | HEIGHT: 71 IN

## 2024-08-16 DIAGNOSIS — R53.83 OTHER FATIGUE: ICD-10-CM

## 2024-08-16 DIAGNOSIS — Z13.0 SCREENING FOR DEFICIENCY ANEMIA: ICD-10-CM

## 2024-08-16 DIAGNOSIS — I10 BENIGN HYPERTENSION: Primary | ICD-10-CM

## 2024-08-16 DIAGNOSIS — Z13.220 SCREENING FOR LIPID DISORDERS: ICD-10-CM

## 2024-08-16 DIAGNOSIS — R73.01 IMPAIRED FASTING GLUCOSE: ICD-10-CM

## 2024-08-16 DIAGNOSIS — Z11.4 SCREENING FOR HIV (HUMAN IMMUNODEFICIENCY VIRUS): ICD-10-CM

## 2024-08-16 PROCEDURE — 99214 OFFICE O/P EST MOD 30 MIN: CPT

## 2024-08-16 PROCEDURE — 88305 TISSUE EXAM BY PATHOLOGIST: CPT | Performed by: PATHOLOGY

## 2024-08-16 RX ORDER — VALSARTAN AND HYDROCHLOROTHIAZIDE 80; 12.5 MG/1; MG/1
1 TABLET, FILM COATED ORAL DAILY
Qty: 30 TABLET | Refills: 5 | Status: SHIPPED | OUTPATIENT
Start: 2024-08-16

## 2024-08-16 NOTE — PROGRESS NOTES
INTERNAL MEDICINE FOLLOW-UP VISIT  [facility] - [facility] INTERNAL MEDICINE [address]    NAME: Douglas Fermin  AGE: 49 y.o. SEX: male  : 1975     DATE: 2024     Assessment and Plan:   1. Benign hypertension  BP in office is 170/92, on recheck it was 180/82.  He is currently on lisinopril 25 mg daily.  He denies any chest pain, shortness of breath, headaches, visual changes, dizziness, lightheadedness, nausea, vomiting, or diaphoresis.  His blood pressures at home have been running around 140s-150s/80s-90s. He does admit to a recent increase in stress.  Discussed starting valsartan-hydrochlorothiazide daily and checking blood pressure 2 hours after morning medications.  Will update blood work and follow-up in 3 weeks for annual physical exam.      Depression Screening and Follow-up Plan: Patient was screened for depression during today's encounter. They screened negative with a PHQ-2 score of 0.       No follow-ups on file.       Chief Complaint:     Chief Complaint   Patient presents with    Follow-up     Review bp medication.      History of Present Illness:   Patient is a 49-year-old male that presents today to review blood pressure medications.     The following portions of the patient's history were reviewed and updated as appropriate: allergies, current medications, past family history, past medical history, past social history, past surgical history and problem list.     Review of Systems:     Review of Systems   Constitutional:  Negative for diaphoresis.   Eyes:  Negative for visual disturbance.   Respiratory:  Negative for cough and shortness of breath.    Cardiovascular:  Negative for chest pain.   Gastrointestinal:  Negative for abdominal pain, nausea and vomiting.   Neurological:  Negative for dizziness, light-headedness and headaches.   Psychiatric/Behavioral:  The patient is nervous/anxious.         Past Medical History:     Past Medical History:   Diagnosis Date     "Anxiety 4/04    Hypertension     Osteoarthritis     Rt hip replacement    Otitis media 6/18    Pneumonia         Current Medications:     Current Outpatient Medications:     acetaminophen (TYLENOL) 325 mg tablet, Take 650 mg by mouth every 6 (six) hours as needed for mild pain, Disp: , Rfl:     lisinopril (ZESTRIL) 20 mg tablet, TAKE 1 TABLET BY MOUTH EVERY DAY, Disp: 90 tablet, Rfl: 4    multivitamin (THERAGRAN) TABS, Take 1 tablet by mouth daily, Disp: , Rfl:     NON FORMULARY, Medical Marijuana daily, Disp: , Rfl:     TART CHERRY PO, Take by mouth, Disp: , Rfl:     Turmeric (QC TUMERIC COMPLEX PO), Take by mouth, Disp: , Rfl:     doxycycline hyclate (VIBRAMYCIN) 100 mg capsule, , Disp: , Rfl:     HYDROcodone-acetaminophen (NORCO) 5-325 mg per tablet, , Disp: , Rfl:     ibuprofen (MOTRIN) 200 mg tablet, Take 200 mg by mouth every 6 (six) hours as needed for mild pain (Patient not taking: Reported on 8/16/2024), Disp: , Rfl:     ibuprofen (MOTRIN) 600 mg tablet, , Disp: , Rfl:     terbinafine (LamISIL) 1 % cream, Apply topically 2 (two) times a day (Patient not taking: Reported on 4/5/2024), Disp: 15 g, Rfl: 2     Allergies:   No Known Allergies     Physical Exam:     /92 (BP Location: Left arm, Patient Position: Sitting, Cuff Size: Standard)   Pulse 68   Resp 18   Ht 5' 11\" (1.803 m)   Wt 76.7 kg (169 lb)   SpO2 98%   BMI 23.57 kg/m²     Physical Exam  Vitals and nursing note reviewed.   Constitutional:       General: He is awake.      Appearance: Normal appearance. He is well-developed, well-groomed and normal weight.   HENT:      Head: Normocephalic and atraumatic.      Right Ear: Hearing and external ear normal.      Left Ear: Hearing and external ear normal.      Nose: Nose normal.      Mouth/Throat:      Lips: Pink.      Mouth: Mucous membranes are moist.   Eyes:      General: Lids are normal. Vision grossly intact. Gaze aligned appropriately.      Conjunctiva/sclera: Conjunctivae normal.   Neck: "      Vascular: No carotid bruit.      Trachea: Trachea and phonation normal.   Cardiovascular:      Rate and Rhythm: Normal rate and regular rhythm.      Heart sounds: Normal heart sounds, S1 normal and S2 normal. No murmur heard.     No friction rub. No gallop.   Pulmonary:      Effort: Pulmonary effort is normal.      Breath sounds: Normal breath sounds and air entry. No decreased breath sounds, wheezing, rhonchi or rales.   Abdominal:      General: Abdomen is flat.   Musculoskeletal:      Cervical back: Neck supple.      Right lower leg: No edema.      Left lower leg: No edema.   Skin:     General: Skin is warm.      Capillary Refill: Capillary refill takes less than 2 seconds.   Neurological:      Mental Status: He is alert.   Psychiatric:         Attention and Perception: Attention and perception normal.         Mood and Affect: Mood and affect normal.         Speech: Speech normal.         Behavior: Behavior normal. Behavior is cooperative.         Thought Content: Thought content normal.         Cognition and Memory: Cognition and memory normal.         Judgment: Judgment normal.           Data:     Laboratory Results: I have personally reviewed the pertinent laboratory results/reports   Radiology/Other Diagnostic Testing Results: I have personally reviewed pertinent reports.      Joanna Dillon PA-C  [facility] INTERNAL MEDICINE [address]

## 2024-09-19 ENCOUNTER — OFFICE VISIT (OUTPATIENT)
Dept: CARDIOLOGY CLINIC | Facility: CLINIC | Age: 49
End: 2024-09-19
Payer: COMMERCIAL

## 2024-09-19 VITALS
HEIGHT: 71 IN | SYSTOLIC BLOOD PRESSURE: 170 MMHG | WEIGHT: 170.6 LBS | HEART RATE: 62 BPM | OXYGEN SATURATION: 99 % | DIASTOLIC BLOOD PRESSURE: 82 MMHG | BODY MASS INDEX: 23.88 KG/M2

## 2024-09-19 DIAGNOSIS — I10 BENIGN HYPERTENSION: ICD-10-CM

## 2024-09-19 DIAGNOSIS — R93.1 ELEVATED CORONARY ARTERY CALCIUM SCORE: ICD-10-CM

## 2024-09-19 DIAGNOSIS — I44.7 LBBB (LEFT BUNDLE BRANCH BLOCK): Primary | ICD-10-CM

## 2024-09-19 DIAGNOSIS — I10 HYPERTENSION, UNSPECIFIED TYPE: ICD-10-CM

## 2024-09-19 DIAGNOSIS — E78.2 MIXED HYPERLIPIDEMIA: ICD-10-CM

## 2024-09-19 PROCEDURE — 99204 OFFICE O/P NEW MOD 45 MIN: CPT | Performed by: INTERNAL MEDICINE

## 2024-09-20 PROCEDURE — 93000 ELECTROCARDIOGRAM COMPLETE: CPT | Performed by: INTERNAL MEDICINE

## 2024-09-20 NOTE — PROGRESS NOTES
Douglas Fermin  1975  [id number]  [facility] [place]  [address]  [place] [id number] [phone]    1. LBBB (left bundle branch block)  AMB extended holter monitor      2. Hypertension, unspecified type  POCT ECG      3. Elevated coronary artery calcium score        4. Benign hypertension        5. Mixed hyperlipidemia            Discussion/Summary: Today is my first visit with the patient.  He has a left bundle branch block that is asymptomatic with a normal stress test.  Continue observation.  Lightheaded spells sound clearly orthostatic increase hydration.  He has an elevated calcium score we talked about initiating statin for risk reduction he like to hold off for the time being.  I did give him an ambulatory Holter monitor to make sure there is been no bradycardia.    Interval History: Very pleasant 49-year-old gentleman presents as a new consult on behalf of Dr. Scott for left bundle branch block.  He is very physically active and works out on a regular basis with no exertional limitations.  Specifically there is been no chest pain, shortness of breath, palpitations.  Denies any lower extremity edema, PND, orthopnea.  He has had no syncope.  Sometimes when he stands up from a seated position he will get lightheaded for about 10 seconds and then this will resolve.  He was a prior smoker.  Denies any significant alcohol or caffeine.  Calcium score recently was mildly elevated.      Medical Problems       Problem List       Benign hypertension    Complex renal cyst    Overview Signed 5/2/2018 12:45 PM by Leandro Rodriguez MD     Description: Bosniak 2 right lower pole, not enhancing         DEBORAH (obstructive sleep apnea)    Polycythemia    Mixed hyperlipidemia    Nicotine dependence with nicotine-induced disorder    Impaired fasting glucose    Snoring    Lung nodule seen on imaging study    Enlarged lymph nodes    Elevated coronary artery calcium score     Medical marijuana use    Urinary retention    Hypertension    LBBB (left bundle branch block)        Past Medical History:   Diagnosis Date    Anxiety     Hypertension     Osteoarthritis     Rt hip replacement    Otitis media     Pneumonia      Social History     Socioeconomic History    Marital status: Single     Spouse name: Not on file    Number of children: Not on file    Years of education: Not on file    Highest education level: Not on file   Occupational History    Not on file   Tobacco Use    Smoking status: Former     Current packs/day: 0.00     Average packs/day: 1 pack/day for 20.7 years (20.7 ttl pk-yrs)     Types: Cigarettes     Start date: 1999     Quit date: 10/1/2019     Years since quittin.9     Passive exposure: Past    Smokeless tobacco: Never   Vaping Use    Vaping status: Never Used   Substance and Sexual Activity    Alcohol use: Not Currently     Comment: Social use    Drug use: Yes     Frequency: 7.0 times per week     Types: Marijuana     Comment: Medical marijuana    Sexual activity: Yes     Partners: Female     Birth control/protection: Condom Male   Other Topics Concern    Not on file   Social History Narrative    Not on file     Social Determinants of Health     Financial Resource Strain: Not on file   Food Insecurity: Not on file   Transportation Needs: Not on file   Physical Activity: Sufficiently Active (10/3/2022)    Exercise Vital Sign     Days of Exercise per Week: 5 days     Minutes of Exercise per Session: 60 min   Stress: Stress Concern Present (2021)    [place] [employer] [place] [employer] - Occupational Stress Questionnaire     Feeling of Stress : Rather much   Social Connections: Not on file   Intimate Partner Violence: Not on file   Housing Stability: Not on file      Family History   Adopted: Yes   Problem Relation Age of Onset    Coronary artery disease Father     Cancer Father         Bladder cancer    No Known Problems Mother     Heart disease Son  "        Heart murmer-    Heart disease Maternal Grandmother      Past Surgical History:   Procedure Laterality Date    HIP SURGERY  06/18    JOINT REPLACEMENT      hip right    NO PAST SURGERIES      [place] TRURL ELECTROSURG RESCJ PROSTATE BLEED COMPLETE N/A 05/26/2023    Procedure: TRANSURETHRAL RESECTION OF PROSTATE (TURP);  Surgeon: Angel Luis Hardin MD;  Location: [facility];  Service: Urology       Current Outpatient Medications:     acetaminophen (TYLENOL) 325 mg tablet, Take 650 mg by mouth every 6 (six) hours as needed for mild pain, Disp: , Rfl:     multivitamin (THERAGRAN) TABS, Take 1 tablet by mouth daily, Disp: , Rfl:     NON FORMULARY, Medical Marijuana daily, Disp: , Rfl:     TART CHERRY PO, Take by mouth, Disp: , Rfl:     Turmeric (QC TUMERIC COMPLEX PO), Take by mouth, Disp: , Rfl:     valsartan-hydrochlorothiazide (DIOVAN-HCT) 80-12.5 MG per tablet, Take 1 tablet by mouth daily, Disp: 30 tablet, Rfl: 5  No Known Allergies    Labs:     Chemistry        Component Value Date/Time    K 4.3 12/29/2023 0848    K 4.1 02/23/2021 0554     12/29/2023 0848     02/23/2021 0554    CO2 26 12/29/2023 0848    CO2 23 02/23/2021 0554    BUN 22 12/29/2023 0848    BUN 21 02/23/2021 0554    CREATININE 0.97 12/29/2023 0848    CREATININE 1.21 02/23/2021 0554        Component Value Date/Time    CALCIUM 9.5 12/29/2023 0848    CALCIUM 9.1 02/23/2021 0554    ALKPHOS 55 12/29/2023 0848    AST 20 12/29/2023 0848    ALT 21 12/29/2023 0848            No results found for: \"CHOL\"  Lab Results   Component Value Date    HDL 43 12/29/2023    HDL 37 (L) 10/05/2022    HDL 36 (L) 08/31/2021     Lab Results   Component Value Date    LDLCALC 101 (H) 12/29/2023    LDLCALC 107 (H) 10/05/2022    LDLCALC 125 (H) 08/31/2021     Lab Results   Component Value Date    TRIG 83 12/29/2023    TRIG 135 10/05/2022    TRIG 147 08/31/2021     No results found for: \"CHOLHDL\"    Imaging: No results found.    ECG:  NSR LBBB      Review of " "Systems   Constitutional: Negative.   HENT: Negative.     Eyes: Negative.    Cardiovascular: Negative.    Respiratory: Negative.     Endocrine: Negative.    Hematologic/Lymphatic: Negative.    Skin: Negative.    Musculoskeletal: Negative.    Gastrointestinal: Negative.    Genitourinary: Negative.    Neurological: Negative.    Psychiatric/Behavioral: Negative.     All other systems reviewed and are negative.      Vitals:    09/19/24 1535   BP: 170/82   Pulse: 62   SpO2: 99%     Vitals:    09/19/24 1535   Weight: 77.4 kg (170 lb 9.6 oz)     Height: 5' 11\" (180.3 cm)   Body mass index is 23.79 kg/m².    Physical Exam:  Vital signs reviewed.  General appearance:  Appears stated age, alert, well appearing and in no distress  HEENT:  PERRLA, EOMI, no scleral icterus, no conjunctival pallor  NECK:  Supple, No elevated JVP, no thyromegaly, no carotid bruits, no JVD  HEART:  Regular rate and rhythm, normal S1/S2, no S3/S4, no murmur or rub, PMI nondisplaced  LUNGS:  Clear to auscultation bilaterally, no wheezes rales or rhonchi  ABDOMEN:  Soft, non-tender, positive bowel sounds, no rebound or guarding, no organomegaly   EXTREMITIES:  Normal range of motion.  No clubbing or cyanosis. No edema  VASCULAR:  Normal pedal pulses   SKIN: No lesions or rashes on exposed skin  NEURO:  CN II-XII intact, no focal deficits    "

## 2024-11-04 ENCOUNTER — APPOINTMENT (OUTPATIENT)
Dept: LAB | Facility: HOSPITAL | Age: 49
End: 2024-11-04
Payer: COMMERCIAL

## 2024-11-04 DIAGNOSIS — Z13.220 SCREENING FOR LIPID DISORDERS: ICD-10-CM

## 2024-11-04 DIAGNOSIS — R53.83 OTHER FATIGUE: ICD-10-CM

## 2024-11-04 DIAGNOSIS — R73.01 IMPAIRED FASTING GLUCOSE: ICD-10-CM

## 2024-11-04 DIAGNOSIS — I10 BENIGN HYPERTENSION: ICD-10-CM

## 2024-11-04 DIAGNOSIS — Z11.4 SCREENING FOR HIV (HUMAN IMMUNODEFICIENCY VIRUS): ICD-10-CM

## 2024-11-04 DIAGNOSIS — Z13.0 SCREENING FOR DEFICIENCY ANEMIA: ICD-10-CM

## 2024-11-04 DIAGNOSIS — Z12.5 SCREENING FOR PROSTATE CANCER: ICD-10-CM

## 2024-11-04 LAB
ALBUMIN SERPL BCG-MCNC: 4.6 G/DL (ref 3.5–5)
ALP SERPL-CCNC: 53 U/L (ref 34–104)
ALT SERPL W P-5'-P-CCNC: 26 U/L (ref 7–52)
ANION GAP SERPL CALCULATED.3IONS-SCNC: 6 MMOL/L (ref 4–13)
AST SERPL W P-5'-P-CCNC: 23 U/L (ref 13–39)
BASOPHILS # BLD AUTO: 0.03 THOUSANDS/ΜL (ref 0–0.1)
BASOPHILS NFR BLD AUTO: 1 % (ref 0–1)
BILIRUB SERPL-MCNC: 0.93 MG/DL (ref 0.2–1)
BUN SERPL-MCNC: 18 MG/DL (ref 5–25)
CALCIUM SERPL-MCNC: 9.7 MG/DL (ref 8.4–10.2)
CHLORIDE SERPL-SCNC: 100 MMOL/L (ref 96–108)
CHOLEST SERPL-MCNC: 204 MG/DL
CO2 SERPL-SCNC: 32 MMOL/L (ref 21–32)
CREAT SERPL-MCNC: 0.96 MG/DL (ref 0.6–1.3)
EOSINOPHIL # BLD AUTO: 0.28 THOUSAND/ΜL (ref 0–0.61)
EOSINOPHIL NFR BLD AUTO: 4 % (ref 0–6)
ERYTHROCYTE [DISTWIDTH] IN BLOOD BY AUTOMATED COUNT: 12.3 % (ref 11.6–15.1)
EST. AVERAGE GLUCOSE BLD GHB EST-MCNC: 100 MG/DL
GFR SERPL CREATININE-BSD FRML MDRD: 91 ML/MIN/1.73SQ M
GLUCOSE P FAST SERPL-MCNC: 109 MG/DL (ref 65–99)
HBA1C MFR BLD: 5.1 %
HCT VFR BLD AUTO: 44.6 % (ref 36.5–49.3)
HDLC SERPL-MCNC: 41 MG/DL
HGB BLD-MCNC: 15.4 G/DL (ref 12–17)
IMM GRANULOCYTES # BLD AUTO: 0.02 THOUSAND/UL (ref 0–0.2)
IMM GRANULOCYTES NFR BLD AUTO: 0 % (ref 0–2)
LDLC SERPL CALC-MCNC: 103 MG/DL (ref 0–100)
LYMPHOCYTES # BLD AUTO: 1.9 THOUSANDS/ΜL (ref 0.6–4.47)
LYMPHOCYTES NFR BLD AUTO: 30 % (ref 14–44)
MAGNESIUM SERPL-MCNC: 2.1 MG/DL (ref 1.9–2.7)
MCH RBC QN AUTO: 32.2 PG (ref 26.8–34.3)
MCHC RBC AUTO-ENTMCNC: 34.5 G/DL (ref 31.4–37.4)
MCV RBC AUTO: 93 FL (ref 82–98)
MONOCYTES # BLD AUTO: 0.58 THOUSAND/ΜL (ref 0.17–1.22)
MONOCYTES NFR BLD AUTO: 9 % (ref 4–12)
NEUTROPHILS # BLD AUTO: 3.53 THOUSANDS/ΜL (ref 1.85–7.62)
NEUTS SEG NFR BLD AUTO: 56 % (ref 43–75)
NONHDLC SERPL-MCNC: 163 MG/DL
NRBC BLD AUTO-RTO: 0 /100 WBCS
PLATELET # BLD AUTO: 180 THOUSANDS/UL (ref 149–390)
PMV BLD AUTO: 9.9 FL (ref 8.9–12.7)
POTASSIUM SERPL-SCNC: 4.5 MMOL/L (ref 3.5–5.3)
PROT SERPL-MCNC: 7.3 G/DL (ref 6.4–8.4)
PSA SERPL-MCNC: 0.7 NG/ML (ref 0–4)
RBC # BLD AUTO: 4.79 MILLION/UL (ref 3.88–5.62)
SODIUM SERPL-SCNC: 138 MMOL/L (ref 135–147)
TRIGL SERPL-MCNC: 302 MG/DL
TSH SERPL DL<=0.05 MIU/L-ACNC: 1.48 UIU/ML (ref 0.45–4.5)
URATE SERPL-MCNC: 7.5 MG/DL (ref 3.5–8.5)
WBC # BLD AUTO: 6.34 THOUSAND/UL (ref 4.31–10.16)

## 2024-11-04 PROCEDURE — 83735 ASSAY OF MAGNESIUM: CPT

## 2024-11-04 PROCEDURE — 84550 ASSAY OF BLOOD/URIC ACID: CPT

## 2024-11-04 PROCEDURE — 36415 COLL VENOUS BLD VENIPUNCTURE: CPT

## 2024-11-04 PROCEDURE — G0103 PSA SCREENING: HCPCS

## 2024-11-04 PROCEDURE — 80053 COMPREHEN METABOLIC PANEL: CPT

## 2024-11-04 PROCEDURE — 87389 HIV-1 AG W/HIV-1&-2 AB AG IA: CPT

## 2024-11-04 PROCEDURE — 83036 HEMOGLOBIN GLYCOSYLATED A1C: CPT

## 2024-11-04 PROCEDURE — 84443 ASSAY THYROID STIM HORMONE: CPT

## 2024-11-04 PROCEDURE — 85025 COMPLETE CBC W/AUTO DIFF WBC: CPT

## 2024-11-04 PROCEDURE — 80061 LIPID PANEL: CPT

## 2024-11-05 LAB
HIV 1+2 AB+HIV1 P24 AG SERPL QL IA: NORMAL
HIV 2 AB SERPL QL IA: NORMAL
HIV1 AB SERPL QL IA: NORMAL
HIV1 P24 AG SERPL QL IA: NORMAL

## 2024-12-04 ENCOUNTER — OFFICE VISIT (OUTPATIENT)
Age: 49
End: 2024-12-04
Payer: COMMERCIAL

## 2024-12-04 VITALS
BODY MASS INDEX: 24.08 KG/M2 | WEIGHT: 172 LBS | SYSTOLIC BLOOD PRESSURE: 160 MMHG | HEART RATE: 63 BPM | HEIGHT: 71 IN | DIASTOLIC BLOOD PRESSURE: 90 MMHG | OXYGEN SATURATION: 100 %

## 2024-12-04 DIAGNOSIS — Z00.00 ANNUAL PHYSICAL EXAM: Primary | ICD-10-CM

## 2024-12-04 DIAGNOSIS — I44.7 LBBB (LEFT BUNDLE BRANCH BLOCK): ICD-10-CM

## 2024-12-04 DIAGNOSIS — R53.83 OTHER FATIGUE: ICD-10-CM

## 2024-12-04 DIAGNOSIS — E78.2 MIXED HYPERLIPIDEMIA: ICD-10-CM

## 2024-12-04 DIAGNOSIS — F41.1 GAD (GENERALIZED ANXIETY DISORDER): ICD-10-CM

## 2024-12-04 DIAGNOSIS — F43.10 PTSD (POST-TRAUMATIC STRESS DISORDER): ICD-10-CM

## 2024-12-04 DIAGNOSIS — I10 BENIGN HYPERTENSION: ICD-10-CM

## 2024-12-04 DIAGNOSIS — F17.211 NICOTINE DEPENDENCE, CIGARETTES, IN REMISSION: ICD-10-CM

## 2024-12-04 DIAGNOSIS — R73.01 IMPAIRED FASTING GLUCOSE: ICD-10-CM

## 2024-12-04 DIAGNOSIS — R91.1 LUNG NODULE SEEN ON IMAGING STUDY: ICD-10-CM

## 2024-12-04 PROBLEM — S30.1XXA ABDOMINAL CONTUSION: Status: RESOLVED | Noted: 2017-11-02 | Resolved: 2024-12-04

## 2024-12-04 PROBLEM — Z86.59 HISTORY OF POSTTRAUMATIC STRESS DISORDER (PTSD): Status: RESOLVED | Noted: 2024-06-02 | Resolved: 2024-12-04

## 2024-12-04 PROBLEM — D75.1 POLYCYTHEMIA: Status: RESOLVED | Noted: 2019-10-14 | Resolved: 2024-12-04

## 2024-12-04 PROBLEM — M16.11 DEGENERATIVE JOINT DISEASE OF RIGHT HIP: Status: ACTIVE | Noted: 2021-02-22

## 2024-12-04 PROBLEM — Z77.29 EXPOSURE TO POTENTIALLY HAZARDOUS SUBSTANCE: Status: RESOLVED | Noted: 2024-12-04 | Resolved: 2024-12-04

## 2024-12-04 PROCEDURE — 99396 PREV VISIT EST AGE 40-64: CPT

## 2024-12-04 RX ORDER — VALSARTAN AND HYDROCHLOROTHIAZIDE 320; 12.5 MG/1; MG/1
1 TABLET, FILM COATED ORAL DAILY
Qty: 30 TABLET | Refills: 0 | Status: SHIPPED | OUTPATIENT
Start: 2024-12-04

## 2024-12-04 NOTE — PROGRESS NOTES
Name: Douglas Fermin      : 1975      MRN: [id number]  Encounter Provider: Joanna Dillon PA-C  Encounter Date: 2024   Encounter department[facility] [address]  :  Assessment & Plan  Annual physical exam  He eats a well-balanced diet of fruits, veggies, and lean meats. Recommended increasing water intake, you should be urinating pale to clear yellow every 2-3 hours. He goes to the gym 5x a week doing strength training and cardio. He sleeps well. He drinks alcohol social socially. He denies any tobacco or illicit drug use. He is UTD with dentist and eye doctor.        Benign hypertension  BP in office is 160/84 and 166/97 on home blood pressure cuff.  On recheck in office it was 160/90.  He is currently on valsartan-hydrochlorothiazide 80-12.5 mg daily.  Recommended increasing to valsartan-hydrochlorothiazide 320-25 mg daily.  Limit salt intake to less than 2000 mg daily.  Will follow-up in 2 to 3 weeks with blood pressure readings.  Orders:    valsartan-hydrochlorothiazide (DIOVAN-HCT) 320-12.5 MG per tablet; Take 1 tablet by mouth daily    Lung nodule seen on imaging study  Last CT chest without contrast from  revealed a partially calcified 1.9 cm left upper lobe lung nodule.  Will obtain CT coronary calcium score to follow-up on lung nodule.       Mixed hyperlipidemia  Last CT coronary calcium score was 142 in .  He is not currently on a statin.  Current ASCVD risk was 9.2%.  Recommended repeating CT coronary calcium score.  Will likely start rosuvastatin 10 mg daily at night.  Reviewed potential adverse effects of medication.  Will follow-up with CT coronary calcium score results.  Reviewed a low-cholesterol diet.  Orders:    Lipid panel; Future    CT coronary calcium score; Future    Nicotine dependence, cigarettes, in remission  He quit around 3 years cold turkey.       LBBB (left bundle branch block)  He is currently asymptomatic with a normal stress  "test.  He follows with cardiology.       DOMENICA (generalized anxiety disorder)  He has a history of anxiety and PTSD from being in the .  He smokes medical marijuana which helps his anxiety tremendously.  He is planning on pursuing disability for underlying DOMENICA, PTSD, and hypertension.              History of Present Illness     Patient is a 49-year-old male that presents today for his annual physical exam.  He has no new complaints today.    Health maintenance:  Up-to-date on labs, due for low-dose CT scan.  Family history of heart disease and bladder cancer.  He refuses flu shot at this time.      Review of Systems   Constitutional:  Negative for chills, fatigue and fever.   HENT:  Negative for ear discharge, ear pain, postnasal drip, rhinorrhea, sinus pressure, sinus pain, sore throat, tinnitus and trouble swallowing.    Eyes:  Negative for pain, discharge and itching.   Respiratory:  Negative for cough, shortness of breath and wheezing.    Cardiovascular:  Negative for chest pain, palpitations and leg swelling.   Gastrointestinal:  Negative for abdominal pain, constipation, diarrhea, nausea and vomiting.   Endocrine: Negative for polydipsia, polyphagia and polyuria.   Genitourinary:  Negative for difficulty urinating, frequency, hematuria and urgency.   Musculoskeletal:  Negative for arthralgias, joint swelling and myalgias.   Skin:  Negative for color change.   Allergic/Immunologic: Negative for environmental allergies.   Neurological:  Negative for dizziness, weakness, light-headedness, numbness and headaches.   Hematological:  Negative for adenopathy.   Psychiatric/Behavioral:  Negative for decreased concentration and sleep disturbance. The patient is not nervous/anxious.           Objective   /84   Pulse 63   Ht 5' 11\" (1.803 m)   Wt 78 kg (172 lb)   SpO2 100%   BMI 23.99 kg/m²      Physical Exam  Vitals and nursing note reviewed.   Constitutional:       General: He is awake.      Appearance: " Normal appearance. He is well-developed, well-groomed and normal weight.   HENT:      Head: Normocephalic and atraumatic.      Right Ear: Hearing and external ear normal.      Left Ear: Hearing and external ear normal.      Nose: Nose normal.      Mouth/Throat:      Lips: Pink.      Mouth: Mucous membranes are moist.   Eyes:      General: Lids are normal. Vision grossly intact. Gaze aligned appropriately.      Conjunctiva/sclera: Conjunctivae normal.   Neck:      Vascular: No carotid bruit.      Trachea: Trachea and phonation normal.   Cardiovascular:      Rate and Rhythm: Normal rate and regular rhythm.      Heart sounds: Normal heart sounds, S1 normal and S2 normal. No murmur heard.     No friction rub. No gallop.   Pulmonary:      Effort: Pulmonary effort is normal.      Breath sounds: Normal breath sounds and air entry. No decreased breath sounds, wheezing, rhonchi or rales.   Abdominal:      General: Abdomen is protuberant.   Musculoskeletal:      Cervical back: Neck supple.      Right lower leg: No edema.      Left lower leg: No edema.   Skin:     General: Skin is warm.      Capillary Refill: Capillary refill takes less than 2 seconds.   Neurological:      Mental Status: He is alert.   Psychiatric:         Attention and Perception: Attention and perception normal.         Mood and Affect: Mood and affect normal.         Speech: Speech normal.         Behavior: Behavior normal. Behavior is cooperative.         Thought Content: Thought content normal.         Cognition and Memory: Cognition and memory normal.         Judgment: Judgment normal.

## 2024-12-04 NOTE — ASSESSMENT & PLAN NOTE
BP in office is 160/84 and 166/97 on home blood pressure cuff.  On recheck in office it was 160/90.  He is currently on valsartan-hydrochlorothiazide 80-12.5 mg daily.  Recommended increasing to valsartan-hydrochlorothiazide 320-25 mg daily.  Limit salt intake to less than 2000 mg daily.  Will follow-up in 2 to 3 weeks with blood pressure readings.  Orders:    valsartan-hydrochlorothiazide (DIOVAN-HCT) 320-12.5 MG per tablet; Take 1 tablet by mouth daily

## 2024-12-04 NOTE — ASSESSMENT & PLAN NOTE
Last CT coronary calcium score was 142 in 2022.  He is not currently on a statin.  Current ASCVD risk was 9.2%.  Recommended repeating CT coronary calcium score.  Will likely start rosuvastatin 10 mg daily at night.  Reviewed potential adverse effects of medication.  Will follow-up with CT coronary calcium score results.  Reviewed a low-cholesterol diet.  Orders:    Lipid panel; Future    CT coronary calcium score; Future

## 2024-12-04 NOTE — ASSESSMENT & PLAN NOTE
Last CT chest without contrast from 2023 revealed a partially calcified 1.9 cm left upper lobe lung nodule.  Will obtain CT coronary calcium score to follow-up on lung nodule.

## 2025-01-02 DIAGNOSIS — N52.9 ERECTILE DYSFUNCTION, UNSPECIFIED ERECTILE DYSFUNCTION TYPE: Primary | ICD-10-CM

## 2025-01-02 DIAGNOSIS — I10 BENIGN HYPERTENSION: ICD-10-CM

## 2025-01-02 RX ORDER — VALSARTAN AND HYDROCHLOROTHIAZIDE 320; 12.5 MG/1; MG/1
1 TABLET, FILM COATED ORAL DAILY
Qty: 100 TABLET | Refills: 3 | Status: SHIPPED | OUTPATIENT
Start: 2025-01-02

## 2025-01-03 DIAGNOSIS — N52.9 ERECTILE DYSFUNCTION, UNSPECIFIED ERECTILE DYSFUNCTION TYPE: Primary | ICD-10-CM

## 2025-01-03 RX ORDER — SILDENAFIL 100 MG/1
TABLET, FILM COATED ORAL
Qty: 10 TABLET | Refills: 4 | Status: SHIPPED | OUTPATIENT
Start: 2025-01-03

## 2025-01-06 ENCOUNTER — HOSPITAL ENCOUNTER (OUTPATIENT)
Dept: CT IMAGING | Facility: HOSPITAL | Age: 50
Discharge: HOME | End: 2025-01-06
Payer: COMMERCIAL

## 2025-01-06 DIAGNOSIS — E78.2 MIXED HYPERLIPIDEMIA: ICD-10-CM

## 2025-01-06 PROCEDURE — 75571 CT HRT W/O DYE W/CA TEST: CPT

## 2025-01-13 ENCOUNTER — RESULTS FOLLOW-UP (OUTPATIENT)
Age: 50
End: 2025-01-13

## 2025-01-14 DIAGNOSIS — E78.2 MIXED HYPERLIPIDEMIA: Primary | ICD-10-CM

## 2025-01-14 DIAGNOSIS — R53.83 FATIGUE, UNSPECIFIED TYPE: ICD-10-CM

## 2025-01-14 RX ORDER — ROSUVASTATIN CALCIUM 5 MG/1
5 TABLET, COATED ORAL DAILY
Qty: 30 TABLET | Refills: 3 | Status: SHIPPED | OUTPATIENT
Start: 2025-01-14

## 2025-03-10 DIAGNOSIS — N52.9 ERECTILE DYSFUNCTION, UNSPECIFIED ERECTILE DYSFUNCTION TYPE: ICD-10-CM

## 2025-03-11 RX ORDER — SILDENAFIL 100 MG/1
TABLET, FILM COATED ORAL
Qty: 10 TABLET | Refills: 2 | Status: SHIPPED | OUTPATIENT
Start: 2025-03-11

## 2025-03-12 DIAGNOSIS — E78.2 MIXED HYPERLIPIDEMIA: ICD-10-CM

## 2025-03-13 RX ORDER — ROSUVASTATIN CALCIUM 5 MG/1
5 TABLET, COATED ORAL DAILY
Qty: 5 TABLET | Refills: 0 | Status: SHIPPED | OUTPATIENT
Start: 2025-03-13

## 2025-04-03 DIAGNOSIS — E78.2 MIXED HYPERLIPIDEMIA: ICD-10-CM

## 2025-04-04 RX ORDER — ROSUVASTATIN CALCIUM 5 MG/1
5 TABLET, COATED ORAL DAILY
Qty: 30 TABLET | Refills: 0 | Status: SHIPPED | OUTPATIENT
Start: 2025-04-04

## 2025-05-03 DIAGNOSIS — E78.2 MIXED HYPERLIPIDEMIA: ICD-10-CM

## 2025-05-05 RX ORDER — ROSUVASTATIN CALCIUM 5 MG/1
5 TABLET, COATED ORAL DAILY
Qty: 30 TABLET | Refills: 0 | Status: SHIPPED | OUTPATIENT
Start: 2025-05-05

## 2025-06-06 DIAGNOSIS — E78.2 MIXED HYPERLIPIDEMIA: ICD-10-CM

## 2025-06-06 RX ORDER — ROSUVASTATIN CALCIUM 5 MG/1
5 TABLET, COATED ORAL DAILY
Qty: 30 TABLET | Refills: 5 | Status: SHIPPED | OUTPATIENT
Start: 2025-06-06

## 2025-06-30 ENCOUNTER — APPOINTMENT (OUTPATIENT)
Dept: LAB | Facility: HOSPITAL | Age: 50
End: 2025-06-30
Payer: COMMERCIAL

## 2025-06-30 ENCOUNTER — HOSPITAL ENCOUNTER (OUTPATIENT)
Dept: ULTRASOUND IMAGING | Facility: HOSPITAL | Age: 50
Discharge: HOME | End: 2025-06-30
Attending: PHYSICIAN ASSISTANT
Payer: COMMERCIAL

## 2025-06-30 DIAGNOSIS — R73.01 IMPAIRED FASTING GLUCOSE: ICD-10-CM

## 2025-06-30 DIAGNOSIS — N52.9 ERECTILE DYSFUNCTION, UNSPECIFIED ERECTILE DYSFUNCTION TYPE: ICD-10-CM

## 2025-06-30 DIAGNOSIS — E78.2 MIXED HYPERLIPIDEMIA: ICD-10-CM

## 2025-06-30 DIAGNOSIS — R53.83 OTHER FATIGUE: ICD-10-CM

## 2025-06-30 DIAGNOSIS — N28.1 COMPLEX RENAL CYST: ICD-10-CM

## 2025-06-30 DIAGNOSIS — R53.83 FATIGUE, UNSPECIFIED TYPE: ICD-10-CM

## 2025-06-30 LAB
ALBUMIN SERPL BCG-MCNC: 4.6 G/DL (ref 3.5–5)
ALP SERPL-CCNC: 53 U/L (ref 34–104)
ALT SERPL W P-5'-P-CCNC: 26 U/L (ref 7–52)
ANION GAP SERPL CALCULATED.3IONS-SCNC: 5 MMOL/L (ref 4–13)
AST SERPL W P-5'-P-CCNC: 25 U/L (ref 13–39)
BASOPHILS # BLD AUTO: 0.03 THOUSANDS/ÂΜL (ref 0–0.1)
BASOPHILS NFR BLD AUTO: 1 % (ref 0–1)
BILIRUB SERPL-MCNC: 0.56 MG/DL (ref 0.2–1)
BUN SERPL-MCNC: 22 MG/DL (ref 5–25)
CALCIUM SERPL-MCNC: 9.6 MG/DL (ref 8.4–10.2)
CHLORIDE SERPL-SCNC: 104 MMOL/L (ref 96–108)
CHOLEST SERPL-MCNC: 139 MG/DL (ref ?–200)
CO2 SERPL-SCNC: 29 MMOL/L (ref 21–32)
CREAT SERPL-MCNC: 0.98 MG/DL (ref 0.6–1.3)
EOSINOPHIL # BLD AUTO: 0.26 THOUSAND/ÂΜL (ref 0–0.61)
EOSINOPHIL NFR BLD AUTO: 4 % (ref 0–6)
ERYTHROCYTE [DISTWIDTH] IN BLOOD BY AUTOMATED COUNT: 11.9 % (ref 11.6–15.1)
EST. AVERAGE GLUCOSE BLD GHB EST-MCNC: 108 MG/DL
GFR SERPL CREATININE-BSD FRML MDRD: 88 ML/MIN/1.73SQ M
GLUCOSE P FAST SERPL-MCNC: 101 MG/DL (ref 65–99)
HBA1C MFR BLD: 5.4 %
HCT VFR BLD AUTO: 42.8 % (ref 36.5–49.3)
HDLC SERPL-MCNC: 40 MG/DL
HGB BLD-MCNC: 15 G/DL (ref 12–17)
IMM GRANULOCYTES # BLD AUTO: 0.01 THOUSAND/UL (ref 0–0.2)
IMM GRANULOCYTES NFR BLD AUTO: 0 % (ref 0–2)
LDLC SERPL CALC-MCNC: 62 MG/DL (ref 0–100)
LYMPHOCYTES # BLD AUTO: 1.76 THOUSANDS/ÂΜL (ref 0.6–4.47)
LYMPHOCYTES NFR BLD AUTO: 30 % (ref 14–44)
MCH RBC QN AUTO: 32.1 PG (ref 26.8–34.3)
MCHC RBC AUTO-ENTMCNC: 35 G/DL (ref 31.4–37.4)
MCV RBC AUTO: 92 FL (ref 82–98)
MONOCYTES # BLD AUTO: 0.52 THOUSAND/ÂΜL (ref 0.17–1.22)
MONOCYTES NFR BLD AUTO: 9 % (ref 4–12)
NEUTROPHILS # BLD AUTO: 3.28 THOUSANDS/ÂΜL (ref 1.85–7.62)
NEUTS SEG NFR BLD AUTO: 56 % (ref 43–75)
NONHDLC SERPL-MCNC: 99 MG/DL
NRBC BLD AUTO-RTO: 0 /100 WBCS
PLATELET # BLD AUTO: 171 THOUSANDS/UL (ref 149–390)
PMV BLD AUTO: 10.5 FL (ref 8.9–12.7)
POTASSIUM SERPL-SCNC: 4.3 MMOL/L (ref 3.5–5.3)
PROT SERPL-MCNC: 7.1 G/DL (ref 6.4–8.4)
RBC # BLD AUTO: 4.67 MILLION/UL (ref 3.88–5.62)
SODIUM SERPL-SCNC: 138 MMOL/L (ref 135–147)
TESTOST SERPL-MSCNC: 485 NG/DL (ref 175–781)
TRIGL SERPL-MCNC: 187 MG/DL (ref ?–150)
TSH SERPL DL<=0.05 MIU/L-ACNC: 1.54 UIU/ML (ref 0.45–4.5)
WBC # BLD AUTO: 5.86 THOUSAND/UL (ref 4.31–10.16)

## 2025-06-30 PROCEDURE — 83036 HEMOGLOBIN GLYCOSYLATED A1C: CPT

## 2025-06-30 PROCEDURE — 85025 COMPLETE CBC W/AUTO DIFF WBC: CPT

## 2025-06-30 PROCEDURE — 84443 ASSAY THYROID STIM HORMONE: CPT

## 2025-06-30 PROCEDURE — 80061 LIPID PANEL: CPT

## 2025-06-30 PROCEDURE — 76775 US EXAM ABDO BACK WALL LIM: CPT

## 2025-06-30 PROCEDURE — 36415 COLL VENOUS BLD VENIPUNCTURE: CPT

## 2025-06-30 PROCEDURE — 80053 COMPREHEN METABOLIC PANEL: CPT

## 2025-06-30 PROCEDURE — 84403 ASSAY OF TOTAL TESTOSTERONE: CPT

## 2025-07-10 RX ORDER — VALSARTAN 160 MG/1
80 TABLET ORAL DAILY
COMMUNITY
Start: 2025-06-03

## 2025-07-10 NOTE — PROGRESS NOTES
Name: Douglas Fermin      : 1975      MRN: [id number]  Encounter Provider: Sarah Schnitzler, PA-C  Encounter Date: 2025   Encounter department: [facility] [place]  :  Assessment & Plan  Benign prostatic hyperplasia with urinary retention  - s/p TURP on 23   - PVR 9 mL        Complex renal cyst  - US kidney bladder from 25 - Minimal change in large right lower renal cyst. Benign and simple appearing  - Can discontinue ongoing imaging surveillance.        Screening for prostate cancer  - PSA from 24 was 0.705        He will follow up PRN at this time.     History of Present Illness   Douglas Fermin is a 50 y.o. male who presents BPH and renal cyst.      He previously was managed on Flomax. He developed urinary retention requiring Parnell catheterization in 2023. He underwent work-up revealing lateral lobe coaptation of the prostate with significant intravesical protrusion. He underwent TURP on 2023 with resolution of urinary retention. TURP pathology was negative for malignancy. He is also followed for renal cyst which is benign appearing and stable on imaging. He has no family history of prostate cancer. Does report family history bladder cancer in biological father.     AUA SYMPTOM SCORE      Flowsheet Row Most Recent Value   AUA SYMPTOM SCORE    How often have you had a sensation of not emptying your bladder completely after you finished urinating? 0   How often have you had to urinate again less than two hours after you finished urinating? 0   How often have you found you stopped and started again several times when you urinate? 0   How often have you found it difficult to postpone urination? 1   How often have you had a weak urinary stream? 0   How often have you had to push or strain to begin urination? 0   How many times did you most typically get up to urinate from the time you went to bed at night until the time you got up in the morning? 1    Quality of Life: If you were to spend the rest of your life with your urinary condition just the way it is now, how would you feel about that? 1   AUA SYMPTOM SCORE 2              Review of Systems   Constitutional:  Negative for chills and fever.   Respiratory:  Negative for shortness of breath.    Cardiovascular:  Negative for chest pain.   Gastrointestinal:  Negative for abdominal pain.   Genitourinary:  Negative for difficulty urinating, dysuria, flank pain, frequency, hematuria and urgency.   Neurological:  Negative for dizziness.        Objective   There were no vitals taken for this visit.    Physical Exam  Constitutional:       Appearance: Normal appearance.   HENT:      Head: Normocephalic and atraumatic.      Right Ear: External ear normal.      Left Ear: External ear normal.      Nose: Nose normal.     Eyes:      General: No scleral icterus.     Conjunctiva/sclera: Conjunctivae normal.       Cardiovascular:      Pulses: Normal pulses.   Pulmonary:      Effort: Pulmonary effort is normal.     Musculoskeletal:         General: Normal range of motion.      Cervical back: Normal range of motion.     Neurological:      General: No focal deficit present.      Mental Status: He is alert and oriented to person, place, and time.     Psychiatric:         Mood and Affect: Mood normal.         Behavior: Behavior normal.         Thought Content: Thought content normal.         Judgment: Judgment normal.          Results   Lab Results   Component Value Date    PSA 0.705 11/04/2024    PSA 2.2 05/12/2023    PSA 1.6 05/03/2022     Lab Results   Component Value Date    CALCIUM 9.6 06/30/2025    K 4.3 06/30/2025    CO2 29 06/30/2025     06/30/2025    BUN 22 06/30/2025    CREATININE 0.98 06/30/2025     Lab Results   Component Value Date    WBC 5.86 06/30/2025    HGB 15.0 06/30/2025    HCT 42.8 06/30/2025    MCV 92 06/30/2025     06/30/2025       Office Urine Dip  No results found for this or any previous visit  (from the past hour).

## 2025-07-10 NOTE — ASSESSMENT & PLAN NOTE
- US kidney bladder from 6/30/25 - Minimal change in large right lower renal cyst. Benign and simple appearing  - Can discontinue ongoing imaging surveillance.

## 2025-07-11 ENCOUNTER — OFFICE VISIT (OUTPATIENT)
Dept: UROLOGY | Facility: CLINIC | Age: 50
End: 2025-07-11
Payer: COMMERCIAL

## 2025-07-11 VITALS
HEIGHT: 71 IN | WEIGHT: 177 LBS | TEMPERATURE: 98 F | BODY MASS INDEX: 24.78 KG/M2 | OXYGEN SATURATION: 98 % | HEART RATE: 74 BPM | SYSTOLIC BLOOD PRESSURE: 134 MMHG | DIASTOLIC BLOOD PRESSURE: 86 MMHG

## 2025-07-11 DIAGNOSIS — N40.1 BENIGN PROSTATIC HYPERPLASIA WITH URINARY RETENTION: Primary | ICD-10-CM

## 2025-07-11 DIAGNOSIS — R33.8 BENIGN PROSTATIC HYPERPLASIA WITH URINARY RETENTION: Primary | ICD-10-CM

## 2025-07-11 DIAGNOSIS — Z12.5 SCREENING FOR PROSTATE CANCER: ICD-10-CM

## 2025-07-11 DIAGNOSIS — N28.1 COMPLEX RENAL CYST: ICD-10-CM

## 2025-07-11 LAB — POST-VOID RESIDUAL VOLUME, ML POC: 9 ML

## 2025-07-11 PROCEDURE — 51798 US URINE CAPACITY MEASURE: CPT | Performed by: PHYSICIAN ASSISTANT

## 2025-07-11 PROCEDURE — 99213 OFFICE O/P EST LOW 20 MIN: CPT | Performed by: PHYSICIAN ASSISTANT

## 2025-08-18 ENCOUNTER — CLINICAL SUPPORT (OUTPATIENT)
Age: 50
End: 2025-08-18

## 2025-08-18 VITALS — DIASTOLIC BLOOD PRESSURE: 82 MMHG | SYSTOLIC BLOOD PRESSURE: 138 MMHG

## 2025-08-18 DIAGNOSIS — I10 BENIGN HYPERTENSION: Primary | ICD-10-CM

## 2025-08-18 PROCEDURE — NURSE

## 2025-08-19 ENCOUNTER — CLINICAL SUPPORT (OUTPATIENT)
Age: 50
End: 2025-08-19

## 2025-08-19 VITALS — SYSTOLIC BLOOD PRESSURE: 128 MMHG | DIASTOLIC BLOOD PRESSURE: 78 MMHG

## 2025-08-19 DIAGNOSIS — I10 BENIGN HYPERTENSION: Primary | ICD-10-CM

## 2025-08-19 PROCEDURE — NURSE

## 2025-08-20 ENCOUNTER — CLINICAL SUPPORT (OUTPATIENT)
Age: 50
End: 2025-08-20

## 2025-08-20 VITALS — DIASTOLIC BLOOD PRESSURE: 82 MMHG | SYSTOLIC BLOOD PRESSURE: 124 MMHG

## 2025-08-20 DIAGNOSIS — I10 BENIGN HYPERTENSION: Primary | ICD-10-CM

## 2025-08-20 PROCEDURE — NURSE

## (undated) DEVICE — GLOVE SRG BIOGEL ECLIPSE 7.5

## (undated) DEVICE — Device: Brand: OLYMPUS

## (undated) DEVICE — SPECIMEN CONTAINER STERILE PEEL PACK

## (undated) DEVICE — PACK TUR

## (undated) DEVICE — INTENDED FOR TISSUE SEPARATION, AND OTHER PROCEDURES THAT REQUIRE A SHARP SURGICAL BLADE TO PUNCTURE OR CUT.: Brand: BARD-PARKER SAFETY BLADES SIZE 15, STERILE

## (undated) DEVICE — BAG URINE DRAINAGE 4000ML CONTINUOUS IRR

## (undated) DEVICE — CYSTO TUBING TUR Y IRRIGATION

## (undated) DEVICE — SUPRAPUBIC OBRIEN ACCESS CATH 26 FR

## (undated) DEVICE — CATH FOLEY COUDE HEMATURIA 24FR 30ML 3 WAY LUBRICATH

## (undated) DEVICE — EVACUATOR BLADDER ELLIK DISP STRL

## (undated) DEVICE — BASIC SINGLE BASIN 2-LF: Brand: MEDLINE INDUSTRIES, INC.